# Patient Record
Sex: FEMALE | Race: WHITE | NOT HISPANIC OR LATINO | Employment: OTHER | URBAN - METROPOLITAN AREA
[De-identification: names, ages, dates, MRNs, and addresses within clinical notes are randomized per-mention and may not be internally consistent; named-entity substitution may affect disease eponyms.]

---

## 2017-08-22 ENCOUNTER — TELEPHONE (OUTPATIENT)
Dept: INTERNAL MEDICINE | Facility: CLINIC | Age: 72
End: 2017-08-22

## 2017-08-22 NOTE — TELEPHONE ENCOUNTER
Panel Management Review      Patient has the following on her problem list:     Depression / Dysthymia review  PHQ-9 SCORE 11/30/2015 1/30/2016 5/27/2016   Total Score - - -   Total Score 18 8 10      Patient is due for:  PHQ9 and WILLIAM        Composite cancer screening  Chart review shows that this patient is due/due soon for the following Mammogram  Summary:    Patient is due/failing the following:   WILLIAM, MAMMOGRAM and PHQ9    Action needed:   Patient needs office visit for Medication follow up and order for Mammogram.    Type of outreach:    Called patient- unable to leave message. Gilmer answered phone and was unable to take message.     Questions for provider review:    None                                                                                                                                     [facility]       Chart routed to Care Team .

## 2017-08-22 NOTE — LETTER
[facility]  [address], [address]  [place], [place]  [address]                                            TEL:[phone]  FAX:[phone]      Sera Figueroa  [address]  [facility] [address]      September 11, 2017    Dear Sera,          At [facility], we care about your health and well-being. A review of your chart has indicated that you are due for a mammogram. Please contact us at [phone] to schedule an appointment.     If you have already had one or all of the above screening tests at another facility, please call us to update your chart.        Sincerely,      Evette Castillo M.D.

## 2017-10-27 ENCOUNTER — HEALTH MAINTENANCE LETTER (OUTPATIENT)
Age: 72
End: 2017-10-27

## 2018-11-09 ENCOUNTER — HEALTH MAINTENANCE LETTER (OUTPATIENT)
Age: 73
End: 2018-11-09

## 2019-09-27 ENCOUNTER — HEALTH MAINTENANCE LETTER (OUTPATIENT)
Age: 74
End: 2019-09-27

## 2019-10-26 ENCOUNTER — HEALTH MAINTENANCE LETTER (OUTPATIENT)
Age: 74
End: 2019-10-26

## 2020-03-15 ENCOUNTER — HEALTH MAINTENANCE LETTER (OUTPATIENT)
Age: 75
End: 2020-03-15

## 2021-01-09 ENCOUNTER — HEALTH MAINTENANCE LETTER (OUTPATIENT)
Age: 76
End: 2021-01-09

## 2021-05-08 ENCOUNTER — HEALTH MAINTENANCE LETTER (OUTPATIENT)
Age: 76
End: 2021-05-08

## 2021-09-01 ENCOUNTER — HOSPITAL ENCOUNTER (EMERGENCY)
Facility: CLINIC | Age: 76
Discharge: LEFT BEFORE DISCHARGE | End: 2021-09-01
Payer: MEDICARE

## 2021-09-01 VITALS
TEMPERATURE: 98.6 F | HEART RATE: 100 BPM | OXYGEN SATURATION: 99 % | SYSTOLIC BLOOD PRESSURE: 122 MMHG | RESPIRATION RATE: 20 BRPM | DIASTOLIC BLOOD PRESSURE: 83 MMHG

## 2021-09-22 ENCOUNTER — TELEPHONE (OUTPATIENT)
Dept: BEHAVIORAL HEALTH | Facility: CLINIC | Age: 76
End: 2021-09-22

## 2021-09-22 ENCOUNTER — HOSPITAL ENCOUNTER (EMERGENCY)
Facility: CLINIC | Age: 76
Discharge: PSYCHIATRIC OR SUBSTANCE USE FACILITY | End: 2021-09-22
Attending: EMERGENCY MEDICINE | Admitting: EMERGENCY MEDICINE
Payer: MEDICARE

## 2021-09-22 ENCOUNTER — HOSPITAL ENCOUNTER (INPATIENT)
Facility: CLINIC | Age: 76
LOS: 10 days | Discharge: HOME | DRG: 885 | End: 2021-10-02
Attending: PSYCHIATRY & NEUROLOGY | Admitting: PSYCHIATRY & NEUROLOGY
Payer: MEDICARE

## 2021-09-22 VITALS
HEART RATE: 80 BPM | TEMPERATURE: 98.4 F | OXYGEN SATURATION: 95 % | WEIGHT: 130 LBS | RESPIRATION RATE: 18 BRPM | SYSTOLIC BLOOD PRESSURE: 120 MMHG | DIASTOLIC BLOOD PRESSURE: 50 MMHG | BODY MASS INDEX: 25.39 KG/M2

## 2021-09-22 DIAGNOSIS — T45.0X2A INTENTIONAL DIPHENHYDRAMINE OVERDOSE, INITIAL ENCOUNTER (H): ICD-10-CM

## 2021-09-22 DIAGNOSIS — E78.5 HYPERLIPIDEMIA LDL GOAL <70: ICD-10-CM

## 2021-09-22 DIAGNOSIS — F33.2 SEVERE EPISODE OF RECURRENT MAJOR DEPRESSIVE DISORDER, WITHOUT PSYCHOTIC FEATURES (H): ICD-10-CM

## 2021-09-22 DIAGNOSIS — F41.1 GAD (GENERALIZED ANXIETY DISORDER): ICD-10-CM

## 2021-09-22 DIAGNOSIS — K21.9 GASTROESOPHAGEAL REFLUX DISEASE WITHOUT ESOPHAGITIS: ICD-10-CM

## 2021-09-22 DIAGNOSIS — B36.9 FUNGAL INFECTION OF SKIN OF ABDOMEN: ICD-10-CM

## 2021-09-22 DIAGNOSIS — E03.9 ACQUIRED HYPOTHYROIDISM: ICD-10-CM

## 2021-09-22 DIAGNOSIS — T50.902A SUICIDE ATTEMPT BY DRUG OVERDOSE (H): ICD-10-CM

## 2021-09-22 DIAGNOSIS — F33.2 SEVERE EPISODE OF RECURRENT MAJOR DEPRESSIVE DISORDER, WITHOUT PSYCHOTIC FEATURES (H): Primary | ICD-10-CM

## 2021-09-22 DIAGNOSIS — F33.0 MAJOR DEPRESSIVE DISORDER, RECURRENT EPISODE, MILD (H): ICD-10-CM

## 2021-09-22 PROBLEM — T14.91XA SUICIDE ATTEMPT (H): Status: ACTIVE | Noted: 2021-09-22

## 2021-09-22 LAB
ALBUMIN SERPL-MCNC: 3.4 G/DL (ref 3.4–5)
ALP SERPL-CCNC: 158 U/L (ref 40–150)
ALT SERPL W P-5'-P-CCNC: 16 U/L (ref 0–50)
AMPHETAMINES UR QL SCN: NORMAL
ANION GAP SERPL CALCULATED.3IONS-SCNC: 9 MMOL/L (ref 3–14)
APAP SERPL-MCNC: <2 MG/L (ref 10–30)
AST SERPL W P-5'-P-CCNC: 10 U/L (ref 0–45)
ATRIAL RATE - MUSE: 82 BPM
BARBITURATES UR QL: NORMAL
BASOPHILS # BLD AUTO: 0.1 10E3/UL (ref 0–0.2)
BASOPHILS NFR BLD AUTO: 1 %
BENZODIAZ UR QL: NORMAL
BILIRUB SERPL-MCNC: 0.3 MG/DL (ref 0.2–1.3)
BUN SERPL-MCNC: 14 MG/DL (ref 7–30)
CALCIUM SERPL-MCNC: 8.5 MG/DL (ref 8.5–10.1)
CANNABINOIDS UR QL SCN: NORMAL
CHLORIDE BLD-SCNC: 108 MMOL/L (ref 94–109)
CO2 SERPL-SCNC: 25 MMOL/L (ref 20–32)
COCAINE UR QL: NORMAL
CREAT SERPL-MCNC: 0.45 MG/DL (ref 0.52–1.04)
DIASTOLIC BLOOD PRESSURE - MUSE: NORMAL MMHG
EOSINOPHIL # BLD AUTO: 0.1 10E3/UL (ref 0–0.7)
EOSINOPHIL NFR BLD AUTO: 2 %
ERYTHROCYTE [DISTWIDTH] IN BLOOD BY AUTOMATED COUNT: 13.5 % (ref 10–15)
ETHANOL SERPL-MCNC: <0.01 G/DL
GFR SERPL CREATININE-BSD FRML MDRD: >90 ML/MIN/1.73M2
GLUCOSE BLD-MCNC: 86 MG/DL (ref 70–99)
HCT VFR BLD AUTO: 38.2 % (ref 35–47)
HGB BLD-MCNC: 11.7 G/DL (ref 11.7–15.7)
HOLD SPECIMEN: NORMAL
HOLD SPECIMEN: NORMAL
IMM GRANULOCYTES # BLD: 0 10E3/UL
IMM GRANULOCYTES NFR BLD: 1 %
INTERPRETATION ECG - MUSE: NORMAL
LYMPHOCYTES # BLD AUTO: 1.5 10E3/UL (ref 0.8–5.3)
LYMPHOCYTES NFR BLD AUTO: 20 %
MCH RBC QN AUTO: 30.2 PG (ref 26.5–33)
MCHC RBC AUTO-ENTMCNC: 30.6 G/DL (ref 31.5–36.5)
MCV RBC AUTO: 99 FL (ref 78–100)
MONOCYTES # BLD AUTO: 0.7 10E3/UL (ref 0–1.3)
MONOCYTES NFR BLD AUTO: 9 %
NEUTROPHILS # BLD AUTO: 5.2 10E3/UL (ref 1.6–8.3)
NEUTROPHILS NFR BLD AUTO: 67 %
NRBC # BLD AUTO: 0 10E3/UL
NRBC BLD AUTO-RTO: 0 /100
OPIATES UR QL SCN: NORMAL
P AXIS - MUSE: 50 DEGREES
PLATELET # BLD AUTO: 272 10E3/UL (ref 150–450)
POTASSIUM BLD-SCNC: 4 MMOL/L (ref 3.4–5.3)
PR INTERVAL - MUSE: 170 MS
PROT SERPL-MCNC: 6.7 G/DL (ref 6.8–8.8)
QRS DURATION - MUSE: 96 MS
QT - MUSE: 380 MS
QTC - MUSE: 443 MS
R AXIS - MUSE: 28 DEGREES
RBC # BLD AUTO: 3.88 10E6/UL (ref 3.8–5.2)
SALICYLATES SERPL-MCNC: <2 MG/DL
SARS-COV-2 RNA RESP QL NAA+PROBE: NEGATIVE
SODIUM SERPL-SCNC: 142 MMOL/L (ref 133–144)
SYSTOLIC BLOOD PRESSURE - MUSE: NORMAL MMHG
T AXIS - MUSE: 55 DEGREES
VENTRICULAR RATE- MUSE: 82 BPM
WBC # BLD AUTO: 7.7 10E3/UL (ref 4–11)

## 2021-09-22 PROCEDURE — 85025 COMPLETE CBC W/AUTO DIFF WBC: CPT | Performed by: EMERGENCY MEDICINE

## 2021-09-22 PROCEDURE — 80307 DRUG TEST PRSMV CHEM ANLYZR: CPT | Performed by: EMERGENCY MEDICINE

## 2021-09-22 PROCEDURE — 124N000003 HC R&B MH SENIOR/ADOLESCENT

## 2021-09-22 PROCEDURE — 93005 ELECTROCARDIOGRAM TRACING: CPT

## 2021-09-22 PROCEDURE — 99285 EMERGENCY DEPT VISIT HI MDM: CPT | Mod: 25

## 2021-09-22 PROCEDURE — 82077 ASSAY SPEC XCP UR&BREATH IA: CPT | Performed by: EMERGENCY MEDICINE

## 2021-09-22 PROCEDURE — 90791 PSYCH DIAGNOSTIC EVALUATION: CPT

## 2021-09-22 PROCEDURE — 80143 DRUG ASSAY ACETAMINOPHEN: CPT | Performed by: EMERGENCY MEDICINE

## 2021-09-22 PROCEDURE — 87635 SARS-COV-2 COVID-19 AMP PRB: CPT | Performed by: EMERGENCY MEDICINE

## 2021-09-22 PROCEDURE — 36415 COLL VENOUS BLD VENIPUNCTURE: CPT | Performed by: EMERGENCY MEDICINE

## 2021-09-22 PROCEDURE — 250N000013 HC RX MED GY IP 250 OP 250 PS 637: Performed by: PSYCHIATRY & NEUROLOGY

## 2021-09-22 PROCEDURE — 80053 COMPREHEN METABOLIC PANEL: CPT | Performed by: EMERGENCY MEDICINE

## 2021-09-22 PROCEDURE — 80179 DRUG ASSAY SALICYLATE: CPT | Performed by: EMERGENCY MEDICINE

## 2021-09-22 RX ORDER — LEVOTHYROXINE SODIUM 75 UG/1
75 TABLET ORAL DAILY
Status: DISCONTINUED | OUTPATIENT
Start: 2021-09-23 | End: 2021-10-02 | Stop reason: HOSPADM

## 2021-09-22 RX ORDER — ASPIRIN 81 MG/1
81 TABLET ORAL DAILY
Status: DISCONTINUED | OUTPATIENT
Start: 2021-09-23 | End: 2021-10-02 | Stop reason: HOSPADM

## 2021-09-22 RX ORDER — PANTOPRAZOLE SODIUM 40 MG/1
40 TABLET, DELAYED RELEASE ORAL DAILY
Status: DISCONTINUED | OUTPATIENT
Start: 2021-09-23 | End: 2021-10-02 | Stop reason: HOSPADM

## 2021-09-22 RX ORDER — TRAZODONE HYDROCHLORIDE 50 MG/1
50 TABLET, FILM COATED ORAL
Status: DISCONTINUED | OUTPATIENT
Start: 2021-09-22 | End: 2021-09-28 | Stop reason: DRUGHIGH

## 2021-09-22 RX ORDER — OLANZAPINE 5 MG/1
5 TABLET ORAL 3 TIMES DAILY PRN
Status: DISCONTINUED | OUTPATIENT
Start: 2021-09-22 | End: 2021-10-02 | Stop reason: HOSPADM

## 2021-09-22 RX ORDER — OLANZAPINE 10 MG/2ML
5 INJECTION, POWDER, FOR SOLUTION INTRAMUSCULAR 3 TIMES DAILY PRN
Status: DISCONTINUED | OUTPATIENT
Start: 2021-09-22 | End: 2021-10-02 | Stop reason: HOSPADM

## 2021-09-22 RX ORDER — MAGNESIUM HYDROXIDE/ALUMINUM HYDROXICE/SIMETHICONE 120; 1200; 1200 MG/30ML; MG/30ML; MG/30ML
30 SUSPENSION ORAL EVERY 4 HOURS PRN
Status: DISCONTINUED | OUTPATIENT
Start: 2021-09-22 | End: 2021-10-02 | Stop reason: HOSPADM

## 2021-09-22 RX ORDER — LEVOTHYROXINE SODIUM 75 UG/1
75 TABLET ORAL DAILY
Status: ON HOLD | COMMUNITY
End: 2021-10-02

## 2021-09-22 RX ORDER — PANTOPRAZOLE SODIUM 40 MG/1
40 TABLET, DELAYED RELEASE ORAL DAILY
Status: ON HOLD | COMMUNITY
End: 2021-10-02

## 2021-09-22 RX ORDER — ACETAMINOPHEN 325 MG/1
650 TABLET ORAL EVERY 4 HOURS PRN
Status: DISCONTINUED | OUTPATIENT
Start: 2021-09-22 | End: 2021-10-02 | Stop reason: HOSPADM

## 2021-09-22 RX ORDER — HYDROXYZINE HYDROCHLORIDE 25 MG/1
25 TABLET, FILM COATED ORAL EVERY 4 HOURS PRN
Status: DISCONTINUED | OUTPATIENT
Start: 2021-09-22 | End: 2021-09-24

## 2021-09-22 RX ORDER — DULOXETIN HYDROCHLORIDE 60 MG/1
120 CAPSULE, DELAYED RELEASE ORAL DAILY
Status: DISCONTINUED | OUTPATIENT
Start: 2021-09-23 | End: 2021-09-23

## 2021-09-22 RX ORDER — LIDOCAINE 40 MG/G
CREAM TOPICAL
Status: DISCONTINUED | OUTPATIENT
Start: 2021-09-22 | End: 2021-09-22 | Stop reason: HOSPADM

## 2021-09-22 RX ADMIN — TRAZODONE HYDROCHLORIDE 50 MG: 50 TABLET ORAL at 22:05

## 2021-09-22 ASSESSMENT — ACTIVITIES OF DAILY LIVING (ADL)
DRESS: SCRUBS (BEHAVIORAL HEALTH)
HYGIENE/GROOMING: INDEPENDENT
ORAL_HYGIENE: INDEPENDENT
LAUNDRY: WITH SUPERVISION

## 2021-09-22 ASSESSMENT — MIFFLIN-ST. JEOR: SCORE: 1008.91

## 2021-09-22 NOTE — ED NOTES
Briefly, this is a 76-year-old female who presents to the emergency department with intentional Benadryl overdose and suicidal ideations.  Please see my partner Dr. Flood's note for full details.  Patient is medically clear at this point.  Did not have any significant hallucinations, tachycardia or significant EKG changes.  Patient was evaluated by DEC once medically cleared.  They recommended inpatient hospitalization due to the patient's inability to contract for safety.  Plan is currently for transfer to [facility] this afternoon.  Patient has remained medically stable in the emergency department.      Eric Story MD  09/23/21 4430

## 2021-09-22 NOTE — ED TRIAGE NOTES
Pt aox4, ABCs intact. Pt states that she took 50+ OTC sleeping medications tonight about 15 minutes PTA in an attempt to end her life.

## 2021-09-22 NOTE — ED NOTES
[facility] is called for report. [facility] reports they called Intake at 1550 today stating they do not have the staff to take the pt. [facility] states they will not be able to take the pt until they are able to get more staff, which could be later in to the night or into the morning. RN requests to continue to update ED on pt's placement and bed status at [facility] pending staffing. [facility] acknowledges.

## 2021-09-22 NOTE — ED NOTES
"In [facility] for [facility] got out July 23 3:48 AM up in [place] and staying with daughter . Pt in car for 2 hours said she took 50 tablets of 25mg benadryl pt says she did this before. But didn't  Stay to be treated. Now pt doesn't want me to contact daughter . Says she wants to kill her self. \"we have had so many deaths this year. Pt has been vaccinated .  "

## 2021-09-22 NOTE — ED NOTES
[facility] called and reports they have more staff coming at 1900. Report is given to nurse on phone. Pt is aware that unit is locked, all belongings will be locked away, and that pt will have a roommate. Pt acknowledges and agrees to being transferred. Transportation is called.

## 2021-09-22 NOTE — ED PROVIDER NOTES
Visit Date: 09/22/2021    CHIEF COMPLAINT:  Overdose.    HISTORY OF PRESENT ILLNESS:  This 76-year-old female who drove herself to the Emergency Department parking lot.  She sat in her car contemplating suicide.  She then states she took approximately 50 over-the-counter Benadryl tablets.  She has the bottle with her.  These were 50 mg gel caps, as well as 25 mg tablets.  These products do not contain acetaminophen.  The patient denies any drug use or alcohol use tonight.  She states stressors include having to stay with her daughter and feeling like a burden.  She states this has been going on some time.  She states she has attempted suicide in the past and is requesting help.  She denies any active hallucinations or other complaints.    PAST MEDICAL HISTORY:     1.  Chronic kidney disease.  2.  Coronary artery disease.  3.  Abdominal wall hernia.  4.  Hyperlipidemia  5.  Anxiety.  6.  Depression.  7.  Osteoporosis.  8.  Nephrolithiasis.  9.  Degenerative disc disease.  10.  Colon polyps.    PAST SURGICAL HISTORY:     1.  Appendectomy.  2.  Cholecystectomy.  3.  Hernia repair.  4.  Gastric banding and jejunal bypass, status post removal.  5.  Right knee arthroscopy.    MEDICATIONS:     1.  Aspirin.  2.  Caltrate.  3.  Voltaren gel.  4.  Cymbalta.  5.  Lunesta.  6.  Fentanyl patch.  7.  Flonase.  8.  Gabapentin.  9.  Mobic.  10.  Remeron.  11.  Nitroglycerin sublingual p.r.n.  12.  Prilosec.  13.  Risedronate.    ALLERGIES:     1.  GLUTAMATE.  2.  MORPHINE.  3.  NICOTINE.    SOCIAL HISTORY:  The patient presents to the Emergency Department by herself.  She occasionally smokes.    REVIEW OF SYSTEMS:  Pertinent 10-point review of systems is negative, except for that noted in the HPI.    PHYSICAL EXAMINATION:    GENERAL:  The patient is tearful, but otherwise appropriate with interaction.  VITAL SIGNS:  Blood pressure 153/72, heart rate 90, respiratory rate 24, oxygenation 99%, temperature 98.4 degrees.  HEENT:   Atraumatic.  Dry mucous membranes.  Pupils are equal, round, reactive to light.  There are normal caliber.  CARDIOVASCULAR:  Regular rhythm, normal rate.  No murmur.  RESPIRATORY:  Clear to auscultation bilaterally without wheezes, rales, or rhonchi.  GASTROINTESTINAL:  Soft, nondistended, nontender.  MUSCULOSKELETAL:  Full range of motion of all extremities.  SKIN:  No pertinent skin findings.  PSYCHIATRIC:  The patient has a depressed mood and affect.  Positive suicidal ideation.  No homicidal ideation.  No hallucinations or psychosis.  NEUROLOGIC:  The patient is alert and oriented x 4 and has normal strength.    LABORATORY EVALUATION AND DIAGNOSTIC STUDIES:    EKG:  A 12-lead electrocardiogram shows normal sinus rhythm with a ventricular rate of 82 beats per minute.  Normal axis.  Normal intervals.  No ectopy.  No ischemic changes or concerns.   LABS:  COVID swab is negative.  Comprehensive metabolic panel is normal with a creatinine of 0.45.  Tylenol, salicylate and alcohol levels are all negative.  CBC shows a white blood cell count of 7.7, hemoglobin 11.7, platelet count 272, otherwise normal.  Urine drug screen of abuse is pending at this time.    EMERGENCY DEPARTMENT COURSE AND MEDICAL DECISION MAKING:  This is a 76-year-old female who presents with an intentional overdose of diphenhydramine.  She is essentially asymptomatic at this time.  She shows no signs of anticholinergic syndrome such as altered mental status, vision changes, tachycardia, etc.  She will need to be observed as peak timing of symptom onset was 4-6 hours post ingestion.  Per Poison Control, she is to be observed in that time.  If she remains essentially asymptomatic, she will be medically clear and stable for a DEC evaluation with a plan to transfer to inpatient mental health.  If she becomes more symptomatic throughout the morning, then she would need admission to the hospital.  She has currently been in the Emergency Department for 3  hours, so still short of peak absorption time.  I will sign her care out to my partner at 7:00 for serial reassessments and disposition planning.    DIAGNOSES:     1.  Major depression.  2.  Suicide attempt by drug ingestion.  3.  Intentional diphenhydramine overdose.    PLAN OF CARE:  As above.    Roly Flood MD        D: 2021   T: 2021   MT: [place]    Name:     GREGORY PERDOMOCallum  MRN:      [id number]        Account:    [id number]   :      1945           Visit Date: 2021     Document: [id number]

## 2021-09-22 NOTE — ED NOTES
Pt says she was visiting people in [place] patient says pill bottles are on front seat of car. Went out to look for them

## 2021-09-22 NOTE — SAFE
Sera Figueroa  September 22, 2021  SAFE Note    Critical Safety Issues: Pt reports current SI with intent. Suicide attempt via overdose this morning.       Current Suicidal Ideation/Self-Injurious Concerns/Methods: Ingestion Pt attempt this morning      Current or Historical Inappropriate Sexual Behavior: No      Current or Historical Aggression/Homicidal Ideation: None - N/A      Triggers: Recent loss of sister and limited support system    Updated care team: Yes: This  consulted with ED MD    For additional details see full LM assessment.       VICTORIA Zaldivar
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
## 2021-09-22 NOTE — TELEPHONE ENCOUNTER
Patient cleared and ready for behavioral bed placement: Yes    S Pt is a 76 year old female at the [facility] ed    B Pt has history of depression and anxiety. Pt bib by self. Pt overdosed on benadryl while in ER parking lot. Pt is medically cleared in ed. Pt has SI with no active plan now. Pt cannot contract for safety. Pt is calm and cooperative in ed. Pt has no OP providers. Pt sister passed away In June as a stressor and then pt moved in with daughter. UTOX and covid19 negative. Pt is medically cleared in ed and cleared by posion control. Pt has needed assist in ed with ambulation to commode.      A Vol (pt does not want placement beyond 2 hours of cities    R 3bw/Sharron    -1158am paged provider  -1234pm unit and ed aware of admi, admit pending a discharge call after 430pm for update hoping discharge and bed is clean by this time

## 2021-09-22 NOTE — PHARMACY-ADMISSION MEDICATION HISTORY
"Admission medication history interview status for this patient is complete. See [facility] admission navigator for allergy information, prior to admission medications and immunization status.     Medication history interview done, indicate source(s): Patient  Medication history resources (including written lists, pill bottles, clinic record): None (no info in [employer] dispense records)  Pharmacy: Unclear - [facility] ?    Changes made to PTA medication list:  Added: levothyroxine, pantoprazole  Changed: none  Reported as Not Taking: none  Removed: B12 injection, diclofenac gel, lunesta, fentanyl patch, flonase, gabapentin, meloxicam, mirtazapine, nitrolgycerin, omeprazole, risedronate    Actions taken by pharmacist (provider contacted, etc):None     Additional medication history information:    **Pt not a great historian, list is per her recollection, unable to verify meds w/ a pharmacy. Our list is from 2016 so plausible that it has changed quite a bit.     Medication reconciliation/reorder completed by provider prior to medication history?  N   (Y/N)         Prior to Admission medications    Medication Sig Last Dose Taking? Auth Provider   aspirin EC 81 MG EC tablet Take 1 tablet (81 mg) by mouth daily Past Week at Unknown time Yes Roly Martinez,    calcium-vitamin D (CALTRATE) 600-400 MG-UNIT per tablet Take 1 tablet by mouth daily Past Week at Unknown time Yes Unknown, Entered By History   DULoxetine (CYMBALTA) 60 MG capsule Take 1 capsule (60 mg) by mouth 2 times daily Past Week at Unknown time Yes Evette Castillo MD   levothyroxine (SYNTHROID/LEVOTHROID) 75 MCG tablet Take 75 mcg by mouth daily Past Week at Unknown time Yes Unknown, Entered By History   pantoprazole (PROTONIX) 40 MG EC tablet Take 40 mg by mouth daily Past Week at Unknown time Yes Unknown, Entered By History         "
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
## 2021-09-23 LAB
CHOLEST SERPL-MCNC: 207 MG/DL
DEPRECATED CALCIDIOL+CALCIFEROL SERPL-MC: 12 UG/L (ref 20–75)
FOLATE SERPL-MCNC: 15.3 NG/ML
HDLC SERPL-MCNC: 41 MG/DL
LDLC SERPL CALC-MCNC: 138 MG/DL
NONHDLC SERPL-MCNC: 166 MG/DL
TRIGL SERPL-MCNC: 142 MG/DL
TSH SERPL DL<=0.005 MIU/L-ACNC: 2.73 MU/L (ref 0.4–4)
VIT B12 SERPL-MCNC: 316 PG/ML (ref 193–986)

## 2021-09-23 PROCEDURE — 82607 VITAMIN B-12: CPT | Performed by: PSYCHIATRY & NEUROLOGY

## 2021-09-23 PROCEDURE — 250N000013 HC RX MED GY IP 250 OP 250 PS 637: Performed by: PSYCHIATRY & NEUROLOGY

## 2021-09-23 PROCEDURE — 80061 LIPID PANEL: CPT | Performed by: PSYCHIATRY & NEUROLOGY

## 2021-09-23 PROCEDURE — 124N000003 HC R&B MH SENIOR/ADOLESCENT

## 2021-09-23 PROCEDURE — 82306 VITAMIN D 25 HYDROXY: CPT | Performed by: PSYCHIATRY & NEUROLOGY

## 2021-09-23 PROCEDURE — 36415 COLL VENOUS BLD VENIPUNCTURE: CPT | Performed by: PSYCHIATRY & NEUROLOGY

## 2021-09-23 PROCEDURE — 84443 ASSAY THYROID STIM HORMONE: CPT | Performed by: PSYCHIATRY & NEUROLOGY

## 2021-09-23 PROCEDURE — 82746 ASSAY OF FOLIC ACID SERUM: CPT | Performed by: PSYCHIATRY & NEUROLOGY

## 2021-09-23 PROCEDURE — 250N000013 HC RX MED GY IP 250 OP 250 PS 637: Performed by: PHYSICIAN ASSISTANT

## 2021-09-23 PROCEDURE — 99207 PR CONSULT E&M CHANGED TO SUBSEQUENT LEVEL: CPT | Performed by: PHYSICIAN ASSISTANT

## 2021-09-23 PROCEDURE — 99232 SBSQ HOSP IP/OBS MODERATE 35: CPT | Performed by: PHYSICIAN ASSISTANT

## 2021-09-23 PROCEDURE — 99223 1ST HOSP IP/OBS HIGH 75: CPT | Mod: AI | Performed by: PSYCHIATRY & NEUROLOGY

## 2021-09-23 RX ORDER — QUETIAPINE FUMARATE 50 MG/1
50 TABLET, FILM COATED ORAL AT BEDTIME
Status: ON HOLD | COMMUNITY
End: 2021-10-01

## 2021-09-23 RX ORDER — HYDROCODONE BITARTRATE AND ACETAMINOPHEN 10; 325 MG/1; MG/1
1 TABLET ORAL EVERY 8 HOURS PRN
Status: ON HOLD | COMMUNITY
End: 2021-10-01

## 2021-09-23 RX ORDER — HYDROXYZINE PAMOATE 50 MG/1
50 CAPSULE ORAL EVERY 6 HOURS PRN
Status: ON HOLD | COMMUNITY
End: 2021-10-02

## 2021-09-23 RX ORDER — DULOXETIN HYDROCHLORIDE 60 MG/1
60 CAPSULE, DELAYED RELEASE ORAL DAILY
Status: DISCONTINUED | OUTPATIENT
Start: 2021-09-23 | End: 2021-09-27

## 2021-09-23 RX ORDER — ATORVASTATIN CALCIUM 20 MG/1
20 TABLET, FILM COATED ORAL EVERY EVENING
Status: DISCONTINUED | OUTPATIENT
Start: 2021-09-23 | End: 2021-10-02 | Stop reason: HOSPADM

## 2021-09-23 RX ORDER — ATORVASTATIN CALCIUM 20 MG/1
20 TABLET, FILM COATED ORAL DAILY
Status: ON HOLD | COMMUNITY
End: 2021-10-02

## 2021-09-23 RX ADMIN — ASPIRIN 81 MG: 81 TABLET, COATED ORAL at 08:12

## 2021-09-23 RX ADMIN — PANTOPRAZOLE SODIUM 40 MG: 40 TABLET, DELAYED RELEASE ORAL at 08:12

## 2021-09-23 RX ADMIN — ATORVASTATIN CALCIUM 20 MG: 20 TABLET, FILM COATED ORAL at 20:36

## 2021-09-23 RX ADMIN — CALCIUM CARBONATE 600 MG (1,500 MG)-VITAMIN D3 400 UNIT TABLET 1 TABLET: at 08:12

## 2021-09-23 RX ADMIN — DULOXETINE HYDROCHLORIDE 60 MG: 60 CAPSULE, DELAYED RELEASE ORAL at 08:12

## 2021-09-23 RX ADMIN — LEVOTHYROXINE SODIUM 75 MCG: 75 TABLET ORAL at 08:12

## 2021-09-23 RX ADMIN — TRAZODONE HYDROCHLORIDE 50 MG: 50 TABLET ORAL at 20:39

## 2021-09-23 RX ADMIN — ACETAMINOPHEN 650 MG: 325 TABLET, FILM COATED ORAL at 10:09

## 2021-09-23 ASSESSMENT — ACTIVITIES OF DAILY LIVING (ADL)
ORAL_HYGIENE: INDEPENDENT
DRESS: SCRUBS (BEHAVIORAL HEALTH);INDEPENDENT
LAUNDRY: UNABLE TO COMPLETE
LAUNDRY: UNABLE TO COMPLETE
HYGIENE/GROOMING: INDEPENDENT
HYGIENE/GROOMING: INDEPENDENT
DRESS: INDEPENDENT
ORAL_HYGIENE: INDEPENDENT

## 2021-09-23 ASSESSMENT — MIFFLIN-ST. JEOR: SCORE: 1005.73

## 2021-09-23 NOTE — PROGRESS NOTES
09/22/21 2142   Patient Belongings   Did you bring any home meds/supplements to the hospital?  No   Patient Belongings sent to security per site process;locker;remains with patient   Patient Belongings Remaining with Patient clothing   Patient Belongings Put in Hospital Secure Location (Security or Locker, etc.) cash/credit card;cell phone/electronics;keys;purse/wallet;shoes;jewelry;wallet;other (see comments)   Belongings Search Yes   Clothing Search   (Search completed by Dominga MARQUEZ RN and Erna BLACK RN)       IN PT LOCKER: facemask, neck gator, hand , lotions, body spray, FlawlessBrows, cigarettes, 2 sets of keys, various change, 2 pairs of earrings, brush, phone , cosmetics, lighter, cellhone, nail clipper, set of round keys, silver-colored cross pendant necklace (broken), purse, wallet.    SENT TO SECURITY (ENVELOPE #[id number]): VISA (3809), VISA (3084), VISA (1730),  Star (4435), MasterCard (0325), MasterCard (1384), RedCard (1901), [employer] (6003), [place] [employer] Card on [facility], $13.00 in cash      A               Admission:  I am responsible for any personal items that are not sent to the safe or pharmacy.  [facility] is not responsible for loss, theft or damage of any property in my possession.    Signature:  _________________________________ Date: _______  Time: _____                                              Staff Signature:  ____________________________ Date: ________  Time: _____      2nd Staff person, if patient is unable/unwilling to sign:    Signature: ________________________________ Date: ________  Time: _____     Discharge:  [facility] has returned all of my personal belongings:    Signature: _________________________________ Date: ________  Time: _____                                          Staff Signature:  ____________________________ Date: ________  Time: _____

## 2021-09-23 NOTE — PHARMACY-ADMISSION MEDICATION HISTORY
Admission Medication History Completed by Pharmacy    See [facility] Admission Navigator for allergy information, preferred outpatient pharmacy, prior to admission medications and immunization status.     Medication History Sources:     Sera    [facility]    [facility] in [place], [place] at [phone]    Changes made to PTA medication list (reason):    Added: Atrovastatin, hydroxyzine, Seroquel, Norco    Deleted: None    Changed: None    Additional Information:    Sera confirmed her home medications. She reported she was unfamiliar with her newer medications.    Confirmed all medications with [facility] in [place], [place], where Sera most recently filled her medications.    Sera reported she has not been taking her medications for approximately 2 weeks.    Norco 10/325 mg last filled 06/29/2021 for quantity 84 for 28 day supply    Prior to Admission medications    Medication Sig Last Dose Taking? Auth Provider   aspirin EC 81 MG EC tablet Take 1 tablet (81 mg) by mouth daily Past Month at Unknown time Yes Roly Martinez,    atorvastatin (LIPITOR) 20 MG tablet Take 20 mg by mouth daily Past Month at Unknown time Yes Unknown, Entered By History   calcium-vitamin D (CALTRATE) 600-400 MG-UNIT per tablet Take 1 tablet by mouth daily Past Month at Unknown time Yes Unknown, Entered By History   DULoxetine (CYMBALTA) 60 MG capsule Take 1 capsule (60 mg) by mouth 2 times daily Past Month at Unknown time Yes Evette Castillo MD   HYDROcodone-acetaminophen (NORCO)  MG per tablet Take 1 tablet by mouth every 8 hours as needed for severe pain  Yes Unknown, Entered By History   hydrOXYzine (VISTARIL) 50 MG capsule Take 50 mg by mouth every 6 hours as needed  Yes Unknown, Entered By History   levothyroxine (SYNTHROID/LEVOTHROID) 75 MCG tablet Take 75 mcg by mouth daily Past Month at Unknown time Yes Unknown, Entered By History   pantoprazole (PROTONIX) 40 MG EC tablet Take 40 mg by mouth daily Past Month at  Unknown time Yes Unknown, Entered By History   QUEtiapine (SEROQUEL) 50 MG tablet Take 50 mg by mouth At Bedtime  Yes Unknown, Entered By History      The information provided in this note is only as accurate as the sources available at the time of the update(s).    Date completed: 09/23/21    Medication history completed by: Julia Pool [facility]
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
## 2021-09-23 NOTE — PROGRESS NOTES
Patient slept a total of 7 hours without any concerns raised the whole shift. She is scheduled for IM Consult and lab draw for TSH, Vit. B-12, Vit. D, Folate, and Lipid Panel.

## 2021-09-23 NOTE — PLAN OF CARE
Problem: Depression  Goal: Improved Mood  9/23/2021 1025 by Christiana Hendricks RN  Outcome: No Change  9/23/2021 1002 by Christiana Hendricks RN  Outcome: No Change     Problem: Activity and Energy Impairment (Anxiety Signs/Symptoms)  Goal: Optimized Energy Level (Anxiety Signs/Symptoms)  9/23/2021 1025 by Christiana Hendricks RN  Outcome: No Change  9/23/2021 1002 by Christiana Hendricks RN  Outcome: No Change  Patient slept well the whole night. She said  I am pretty good  when asked how she is feeling today. She doesn't have any goal for today. She has insights of the reason she is hospitalized. She is alert and oriented x 3. Denied wishing herself to be dead. Denied any racing thoughts. Denied SI/SIB nor hallucinations. She is social with staff and select peers. Her mood is calm with a full-range of affect. She feels safe on the unit. She expressed that she is  not really so hopeful because my kids don't care about me. Besides it is so expensive living here . She doesn't have a place to stay here in [place] after discharge. Patient remains depressed. She rated her depression as 8 out of 10 (10 is the worst) and anxiety about the same level.     She complained of headache. Tylenol 650 mgs given as prn for headache. She said that she has occasional lower back pains which is a result of four previous back surgeries, the last one was in April 2021. Her last BM was a few days ago. She can't remember the exact date. Patient did not attend groups today. She was just napping.

## 2021-09-23 NOTE — PLAN OF CARE
Sera is a pleasant 77 y/o F who presents from [facility] after an overdose on Benadryl while sitting in her car in the ED parking lot. Pt has a hx of depression and anxiety. She has been hospitalized with [facility] back in 2016 also for an overdose. Reports hospitalization in [place] this past summer due to not eating for 15 days after her sister passed away - reports that she was really depressed at this time. Pt presents with a sad affect and depressed, anxious mood. Currently denies SI/SIB and hallucinations - pt can contract for safety in the hospital and will alert staff if that changes. Rates depression 6/10 and anxiety 10/10. Pt hopes to get back on her medications as she reports that she has not taken them for about two weeks. Pt unsure where she will go after discharge as she reports that her son and daughter do not want her living with them. Reports feeling like her mind is racing. It was noted during admission interview pt appeared restless and had the inability to sit still. Pt does have glasses and dentures. Reports three falls recently - states that she was not utilizing a walker at the time and she should have. Pt came in with a quad cane but was given a walker to use on the unit. She was educated on calling for help if she cannot safely get up and where the call buttons are in her room - she expressed understanding. Denies pain. VSS. Pt does report having some chronic redness under her panniculus which she reports have a bad smell from, denies itching - will defer to medical for possible fungal treatment. A&Ox4. Pt does have a abdominal hernia. PRN Trazodone given for sleep this evening. No other concerns or complaints noted at this time.

## 2021-09-23 NOTE — H&P
"[facility]   Psychiatric History and Physical  Admission date: 9/22/2021        Chief Complaint:   \"I have had a lot of losses in the past year.\"         HPI:     The patient is a 77yo female with a history of depression who was admitted after overdosing on 50 Benadryl tablets. Says that she was intending to end her life. Says that her sister passed away this summer and she wasn't suicidal then - \"I just wanted her back.\" Says that she is no longer having suicidal thoughts and \"just want to get better.\" Has been off her Cymbalta for two weeks and did find it beneficial. Says that she has been on it since 1995. Denies HI. Feels safe here in the hospital. Slept \"the best in forever\" last night with Trazodone. Eating well. Doesn't currently have providers in [place] nor a safe place to stay.     Per ER:  HISTORY OF PRESENT ILLNESS:  This 76-year-old female who drove herself to the Emergency Department parking lot.  She sat in her car contemplating suicide.  She then states she took approximately 50 over-the-counter Benadryl tablets.  She has the bottle with her.  These were 50 mg gel caps, as well as 25 mg tablets.  These products do not contain acetaminophen.  The patient denies any drug use or alcohol use tonight.  She states stressors include having to stay with her daughter and feeling like a burden.  She states this has been going on some time.  She states she has attempted suicide in the past and is requesting help.  She denies any active hallucinations or other complaints.        Past Psychiatric History:     Hospitalized in [place] this year after her sister passed away.   Hospitalized here in 2016 after an overdose.         Substance Use and History:     Denies alcohol or drug use.         Past Medical History:   PAST MEDICAL HISTORY:   Past Medical History:   Diagnosis Date     Abdominal wall hernia      Adenomatous polyp of colon 01/01/2009     CAD (coronary artery " disease)     2 stents, last angiogram  negative     Chronic kidney disease      Degenerative disk disease      WILLIAM (generalized anxiety disorder)      Hyperlipidemia      Major depression      Nephrolithiasis      Osteoporosis        PAST SURGICAL HISTORY:   Past Surgical History:   Procedure Laterality Date     APPENDECTOMY       ARTHROSCOPY KNEE Right      CHOLECYSTECTOMY       Coronary angiogram with stents x2       HERNIA REPAIR       Jejunal bypass, taken down, gastric banding - Later removed               Family History:   FAMILY HISTORY: Patient reports MH issues in her family.   Family History   Problem Relation Age of Onset     Diabetes Mother      Heart Disease Father            Social History:   Please see the full psychosocial profile from the clinical treatment coordinator.   SOCIAL HISTORY:   Social History     Tobacco Use     Smoking status: Current Some Day Smoker     Packs/day: 1.00     Types: Cigarettes     Last attempt to quit: 2015     Years since quittin.9     Smokeless tobacco: Former User   Substance Use Topics     Alcohol use: Yes     Comment: occassionally            Physical ROS:   The 10-point review of systems was negative except as noted in HPI.         PTA Medications:     Medications Prior to Admission   Medication Sig Dispense Refill Last Dose     aspirin EC 81 MG EC tablet Take 1 tablet (81 mg) by mouth daily 90 tablet 0      calcium-vitamin D (CALTRATE) 600-400 MG-UNIT per tablet Take 1 tablet by mouth daily        DULoxetine (CYMBALTA) 60 MG capsule Take 1 capsule (60 mg) by mouth 2 times daily 180 capsule 3      levothyroxine (SYNTHROID/LEVOTHROID) 75 MCG tablet Take 75 mcg by mouth daily        pantoprazole (PROTONIX) 40 MG EC tablet Take 40 mg by mouth daily             Allergies:     Allergies   Allergen Reactions     Monosodium Glutamate      Morphine Hcl Rash     Nicotine Polacrilex [Nicotine] Rash     Gets a rash from nicotine patch. Pt is a smoker.            Labs:     Recent Results (from the past 48 hour(s))   EKG 12 lead    Collection Time: 09/22/21  3:42 AM   Result Value Ref Range    Systolic Blood Pressure  mmHg    Diastolic Blood Pressure  mmHg    Ventricular Rate 82 BPM    Atrial Rate 82 BPM    AL Interval 170 ms    QRS Duration 96 ms     ms    QTc 443 ms    P Axis 50 degrees    R AXIS 28 degrees    T Axis 55 degrees    Interpretation ECG       Sinus rhythm  Normal ECG  When compared with ECG of 02-OCT-2016 02:23,  No significant change was found  Confirmed by - EMERGENCY ROOM, PHYSICIAN ([id number]),  COLIN ECHEVARRIA (Chavez) on 9/22/2021 7:08:54 AM     Comprehensive metabolic panel    Collection Time: 09/22/21  4:19 AM   Result Value Ref Range    Sodium 142 133 - 144 mmol/L    Potassium 4.0 3.4 - 5.3 mmol/L    Chloride 108 94 - 109 mmol/L    Carbon Dioxide (CO2) 25 20 - 32 mmol/L    Anion Gap 9 3 - 14 mmol/L    Urea Nitrogen 14 7 - 30 mg/dL    Creatinine 0.45 (L) 0.52 - 1.04 mg/dL    Calcium 8.5 8.5 - 10.1 mg/dL    Glucose 86 70 - 99 mg/dL    Alkaline Phosphatase 158 (H) 40 - 150 U/L    AST 10 0 - 45 U/L    ALT 16 0 - 50 U/L    Protein Total 6.7 (L) 6.8 - 8.8 g/dL    Albumin 3.4 3.4 - 5.0 g/dL    Bilirubin Total 0.3 0.2 - 1.3 mg/dL    GFR Estimate >90 >60 mL/min/1.73m2   Acetaminophen level    Collection Time: 09/22/21  4:19 AM   Result Value Ref Range    Acetaminophen <2 (L) 10 - 30 mg/L   Salicylate level    Collection Time: 09/22/21  4:19 AM   Result Value Ref Range    Salicylate <2 <20 mg/dL   Alcohol ethyl    Collection Time: 09/22/21  4:19 AM   Result Value Ref Range    Alcohol ethyl <0.01 <=0.01 g/dL   CBC with platelets and differential    Collection Time: 09/22/21  4:19 AM   Result Value Ref Range    WBC Count 7.7 4.0 - 11.0 10e3/uL    RBC Count 3.88 3.80 - 5.20 10e6/uL    Hemoglobin 11.7 11.7 - 15.7 g/dL    Hematocrit 38.2 35.0 - 47.0 %    MCV 99 78 - 100 fL    MCH 30.2 26.5 - 33.0 pg    MCHC 30.6 (L) 31.5 - 36.5 g/dL    RDW 13.5 10.0 - 15.0 %     Platelet Count 272 150 - 450 10e3/uL    % Neutrophils 67 %    % Lymphocytes 20 %    % Monocytes 9 %    % Eosinophils 2 %    % Basophils 1 %    % Immature Granulocytes 1 %    NRBCs per 100 WBC 0 <1 /100    Absolute Neutrophils 5.2 1.6 - 8.3 10e3/uL    Absolute Lymphocytes 1.5 0.8 - 5.3 10e3/uL    Absolute Monocytes 0.7 0.0 - 1.3 10e3/uL    Absolute Eosinophils 0.1 0.0 - 0.7 10e3/uL    Absolute Basophils 0.1 0.0 - 0.2 10e3/uL    Absolute Immature Granulocytes 0.0 <=0.0 10e3/uL    Absolute NRBCs 0.0 10e3/uL   Asymptomatic COVID-19 Virus (Coronavirus) by PCR Nasopharyngeal    Collection Time: 09/22/21  4:26 AM    Specimen: Nasopharyngeal; Swab   Result Value Ref Range    SARS CoV2 PCR Negative Negative   Extra Blue Top Tube    Collection Time: 09/22/21  4:35 AM   Result Value Ref Range    Hold Specimen [facility]    Extra Heparinized Syringe    Collection Time: 09/22/21  4:35 AM   Result Value Ref Range    Hold Specimen [facility]    Drug abuse screen 1 urine (ED)    Collection Time: 09/22/21  6:51 AM   Result Value Ref Range    Amphetamines Urine Screen Negative Screen Negative    Barbiturates Urine Screen Negative Screen Negative    Benzodiazepines Urine Screen Negative Screen Negative    Cannabinoids Urine Screen Negative Screen Negative    Cocaine Urine Screen Negative Screen Negative    Opiates Urine Screen Negative Screen Negative          Physical and Psychiatric Examination:     Temp 97.9  F (36.6  C) (Oral)   Resp 18   Ht 1.524 m (5')   Wt 58.7 kg (129 lb 8 oz)   SpO2 97%   BMI 25.29 kg/m    Weight is 129 lbs 8 oz  Body mass index is 25.29 kg/m .    Physical Exam:  I have reviewed the physical exam as documented by the medical team and agree with findings and assessment and have no additional findings to add at this time.    Mental Status Exam:  Appearance: awake, alert and adequately groomed  Attitude:  cooperative  Eye Contact:  good  Mood:  depressed  Affect:  mood congruent  Speech:  clear, coherent  Language:  fluent and intact in English  Psychomotor, Gait, Musculoskeletal:  no evidence of tardive dyskinesia, dystonia, or tics  Thought Process:  goal oriented  Associations:  no loose associations  Thought Content:  no evidence of suicidal ideation or homicidal ideation and no evidence of psychotic thought  Insight:  fair  Judgement:  intact  Oriented to:  time, person, and place  Attention Span and Concentration:  intact  Recent and Remote Memory:  intact  Fund of Knowledge:  appropriate         Admission Diagnoses:   MDD, recurrent, 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 Will titrate to PTA dose as tolerated.   2) CTC to ensure followup with psychiatry and therapy at discharge.   3) Will explore options for disposition.       Disposition Plan   Reason for ongoing admission: poses an imminent risk to self  Discharge location: TBD  Discharge Medications: not ordered  Follow-up Appointments: not scheduled  Legal Status: voluntary    Entered by: Avery Mcdermott on 9/23/2021 at 4:59 AM

## 2021-09-23 NOTE — PLAN OF CARE
BEHAVIORAL TEAM DISCUSSION    Participants: Avery Mcdermott MD, Christiana Hendricks, RN, Rain Rebolledo, BABATUNDE, VAMSI, Camille, OT,   Progress: New Admit.  Anticipated length of stay: 5-7 days  Continued Stay Criteria/Rationale: depression and suicidal   Medical/Physical: See H&P  Precautions:   Behavioral Orders   Procedures     Code 1 - Restrict to Unit     Fall precautions     Routine Programming     As clinically indicated     Status 15     Every 15 minutes.     Suicide precautions     Patients on Suicide Precautions should have a Combination Diet ordered that includes a Diet selection(s) AND a Behavioral Tray selection for Safe Tray - with utensils, or Safe Tray - NO utensils       Plan: Patient will have psychiatric assessment and medication management by the psychiatrist. Medications will be reviewed and adjusted per MD as indicated. The treatment team will continue to assess and stabilize the patient's mental health symptoms with the use of medications and therapeutic programming. Hospital staff will provide a safe environment and a therapeutic milieu. Staff will continue to assess patient as needed. Patient will participate in unit groups and activities. Patient will receive individual and group support on the unit.      CTC will do individual inpatient treatment planning and after care planning. CTC will discuss options for increasing community supports with the patient. CTC will coordinate with outpatient providers and will place referrals to ensure appropriate follow up care is in place.      Rationale for change in precautions or plan: initial plan.

## 2021-09-23 NOTE — CONSULTS
Consult Date: 2021    HISTORY OF PRESENT ILLNESS:  The patient is a 76-year-old female with history of depression, admitted to station 3B, status post suicide attempt by overdose of taking over approximately 15 Benadryl tablets yesterday.  While in the Emergency Department, Poison Control was contacted and she was observed and remained essentially asymptomatic prior to transfer to inpatient Psychiatry.  Internal Medicine consultation was ordered by Dr. Mcdermott to assess medical problems including hypothyroidism and history of coronary artery disease.  At this time, Sera is pleasant and denies acute physical concerns including fever, chills, chest pain, shortness of breath, abdominal pain, nausea, bowel or bladder concerns.    PAST MEDICAL HISTORY:    1.  Depression and other psychiatric history per Dr. Mcdermott.  2.  Hypothyroidism, on replacement.  3.  GERD.  4.  History of coronary artery disease, status post stenting.  5.  Hyperlipidemia.  6.  Chronic knee pain.    Past Surgical History:   Procedure Laterality Date     APPENDECTOMY       ARTHROSCOPY KNEE Right      CHOLECYSTECTOMY       Coronary angiogram with stents x2       HERNIA REPAIR       Jejunal bypass, taken down, gastric banding - Later removed          ADMISSION MEDICATIONS:  Reviewed and listed in medication reconciliation list.    ALLERGIES:  ADVERSE EFFECTS MONOSODIUM GLUTAMATE, MORPHINE AND NICOTINE.    Social History     Socioeconomic History     Marital status:      Spouse name: Not on file     Number of children: Not on file     Years of education: Not on file     Highest education level: Not on file   Occupational History     Not on file   Tobacco Use     Smoking status: Current Some Day Smoker     Packs/day: 1.00     Types: Cigarettes     Last attempt to quit: 2015     Years since quittin.9     Smokeless tobacco: Former User   Substance and Sexual Activity     Alcohol use: Yes     Comment: occassionally     Drug use:  No     Sexual activity: Never   Other Topics Concern     Parent/sibling w/ CABG, MI or angioplasty before 65F 55M? Not Asked   Social History Narrative     Not on file     Social Determinants of Health     Financial Resource Strain:      Difficulty of Paying Living Expenses:    Food Insecurity:      Worried About Running Out of Food in the Last Year:      Ran Out of Food in the Last Year:    Transportation Needs:      Lack of Transportation (Medical):      Lack of Transportation (Non-Medical):    Physical Activity:      Days of Exercise per Week:      Minutes of Exercise per Session:    Stress:      Feeling of Stress :    Social Connections:      Frequency of Communication with Friends and Family:      Frequency of Social Gatherings with Friends and Family:      Attends Denominational Services:      Active Member of Clubs or Organizations:      Attends Club or Organization Meetings:      Marital Status:    Intimate Partner Violence:      Fear of Current or Ex-Partner:      Emotionally Abused:      Physically Abused:      Sexually Abused:         Family History   Problem Relation Age of Onset     Diabetes Mother      Heart Disease Father         REVIEW OF SYSTEMS:  A 10-point review of systems is negative except for stated above in history of present illness.    PHYSICAL EXAMINATION:    GENERAL:  Pleasant lady, in no acute distress.  VITAL SIGNS:  Stable, temperature afebrile, pulse in the 80s, blood pressure 98/65.  LUNGS:  Clear.  CARDIOVASCULAR:  Regular rate and rhythm.  ABDOMEN:  Soft, nontender.  EXTREMITIES:  No edema.  SKIN:  No rash on exposed areas.  NEUROLOGIC:  She is awake, alert and oriented x3.  Cranial nerves grossly intact.  Motor strength is symmetric.  She is not tremulous.    LABORATORY DATA:  Total alkaline phosphatase 158, total cholesterol 207, HDL 41, .  Vitamin D level low at 12.  Otherwise, CBC rest of comprehensive panel, lipid panel and TSH essentially normal.  Vitamin B12 level within  normal limits.  Urine drug screen negative.  Tylenol, salicylate and alcohol levels negative.  COVID testing negative.  EKG:  Sinus rhythm and otherwise unremarkable.    IMPRESSION:    1.  Depression, status post overdose.  The patient is medically stable.  Treatment to be continued and followed by Dr. Mcdermott.  2.  Hypothyroidism, currently euthyroid based on normal thyroid function testing on admission.  3.  Hyperlipidemia with current lipid panel revealing some increased dyslipidemia; however, question fasting blood draw or not.  4.  History of coronary artery disease, status post stenting without any current concerns at this time.  5.  Gastroesophageal reflux disease, stable on daily PPI therapy.    PLAN:  No medical intervention indicated at this time.  Continue with [id number] statin therapy and aspirin as well as levothyroxine and pantoprazole.  She should follow up with family physician in 3-6 months after discharge for a repeat fasting lipid panel.  The patient is medically stable and medicine signing off.  Please feel free to call if questions.    Thank you for this consultation.    Rick Zarco PA-C        D: 2021   T: 2021   MT: [id number]    Name:     GREGORY PERDOMOCallum  MRN:      [id number]        Account:      [id number]   :      1945           Consult Date: 2021     Document: [id number]

## 2021-09-23 NOTE — PROGRESS NOTES
CLINICAL NUTRITION SERVICES - ASSESSMENT NOTE     Nutrition Prescription    RECOMMENDATIONS FOR MDs/PROVIDERS TO ORDER:  None at this time    Malnutrition Status:    Unable to determine- need NFPE, weight history (over past year) and recent intake history    Recommendations already ordered by Registered Dietitian (RD):  Nutrition Education: Unable to complete due to pt unavailable   Continue current diet orders    Future/Additional Recommendations:  Monitor intake, weight, need for supplementation     REASON FOR ASSESSMENT  Sera Figueroa is a/an 76 year old female assessed by the dietitian for MST score of 2: positive  for weight loss of 2-13 lbs and positive  for poor oral intake related to decreased appetite.  Pt admitted following intentional overdose. PMH significant for CAD, gastric ulcer, CKD, degenerative disc disease, hypothyroidism, anxiety, and depression     NUTRITION HISTORY  - Unable to obtain nutrition history- pt unavailable at time of visit  - Allergies/intolerances- MSG    CURRENT NUTRITION ORDERS  Diet: Regular  Intake/Tolerance: no information yet    LABS  Labs reviewed    Electrolytes  Sodium (mmol/L)   Date Value   09/22/2021 142   10/03/2016 141     Potassium (mmol/L)   Date Value   09/22/2021 4.0   10/03/2016 4.1     No results found for: MAG, PHOS Renal  Urea Nitrogen (mg/dL)   Date Value   09/22/2021 14   10/03/2016 14     Creatinine (mg/dL)   Date Value   09/22/2021 0.45 (L)   10/03/2016 0.58     Inflammatory Markers  WBC (10e9/L)   Date Value   10/02/2016 9.1     WBC Count (10e3/uL)   Date Value   09/22/2021 7.7     Albumin (g/dL)   Date Value   09/22/2021 3.4   10/03/2016 3.2 (L)      Blood Glucose  Glucose (mg/dL)   Date Value   09/22/2021 86   10/03/2016 101 (H)    Hepatic  ALT (U/L)   Date Value   09/22/2021 16   10/03/2016 19     AST (U/L)   Date Value   09/22/2021 10   10/03/2016 17     Alkaline Phosphatase (U/L)   Date Value   09/22/2021 158 (H)   10/03/2016 106     Bilirubin  "Total (mg/dL)   Date Value   09/22/2021 0.3   10/03/2016 0.6    Additional  Triglycerides (mg/dL)   Date Value   09/23/2021 142   06/26/2015 114     Ketones Urine (mg/dL)   Date Value   10/04/2016 Negative        Vitamin D, Total (25-Hydroxy)   Date Value Ref Range Status   09/23/2021 12 (L) 20 - 75 ug/L Final     Vitamin B12   Date Value Ref Range Status   09/23/2021 316 193 - 986 pg/mL Final       MEDICATIONS    aspirin  81 mg Oral Daily     calcium carbonate 600 mg-vitamin D 400 units  1 tablet Oral Daily     DULoxetine  60 mg Oral Daily     levothyroxine  75 mcg Oral Daily     pantoprazole  40 mg Oral Daily        ANTHROPOMETRICS  Height: 152.4 cm (5' 0\")  Most Recent Weight: 58.7 kg (129 lb 8 oz)    IBW: 45.5 kg  Body mass index is 25.29 kg/m .  BMI: Overweight BMI 25-29.9  Weight History:   Wt Readings from Last 10 Encounters:   09/22/21 58.7 kg (129 lb 8 oz)   09/22/21 59 kg (130 lb)   10/03/16 84.5 kg (186 lb 4.8 oz)   10/02/16 84.9 kg (187 lb 3.2 oz)   09/30/16 85.3 kg (188 lb)   06/27/16 86.2 kg (190 lb)   05/27/16 86.6 kg (191 lb)   04/08/16 83.5 kg (184 lb)   03/01/16 82.4 kg (181 lb 9.6 oz)   02/24/16 83.9 kg (185 lb)     Weight loss noted over past 5 years, but unable to locate more recent weight records     Dosing Weight: 49 kg, adjusted based on current weight of 58.7 kg and IBW of 45.5 kg    ASSESSED NUTRITION NEEDS  Estimated Energy Needs: 4606-0002 kcals/day (25 - 30 kcals/kg)  Justification: Overweight  Estimated Protein Needs: 49-59 grams protein/day (1 - 1.2 grams of pro/kg)  Justification: Maintenance  Estimated Fluid Needs: 8165-0950 mL/day (1 mL/kcal)   Justification: Maintenance    MALNUTRITION  % Intake: Unable to assess  % Weight Loss: Unable to assess  Subcutaneous Fat Loss: Unable to assess  Muscle Loss: Unable to assess  Fluid Accumulation/Edema: None noted  Malnutrition Diagnosis: Unable to determine     NUTRITION DIAGNOSIS  Inadequate oral intake related to mental health symptom " exacerbation as evidenced by pt report of weight loss and decreased PO PTA      INTERVENTIONS  Implementation  Nutrition Education: Unable to complete due to pt unavailable   Continue current diet orders    Goals  Patient to consume % of nutritionally adequate meal trays TID, or the equivalent with supplements/snacks.     Monitoring/Evaluation  Progress toward goals will be monitored and evaluated per protocol.  Milly Valencia RD, LD  ICU/5A/OB/Mental Health Pager (M-F): [phone]  On Call Pager (weekends only): [phone]

## 2021-09-24 PROCEDURE — 99232 SBSQ HOSP IP/OBS MODERATE 35: CPT | Performed by: PSYCHIATRY & NEUROLOGY

## 2021-09-24 PROCEDURE — 250N000013 HC RX MED GY IP 250 OP 250 PS 637: Performed by: PSYCHIATRY & NEUROLOGY

## 2021-09-24 PROCEDURE — 250N000013 HC RX MED GY IP 250 OP 250 PS 637: Performed by: PHYSICIAN ASSISTANT

## 2021-09-24 PROCEDURE — 124N000003 HC R&B MH SENIOR/ADOLESCENT

## 2021-09-24 RX ORDER — DULOXETIN HYDROCHLORIDE 30 MG/1
30 CAPSULE, DELAYED RELEASE ORAL AT BEDTIME
Status: DISCONTINUED | OUTPATIENT
Start: 2021-09-24 | End: 2021-09-27

## 2021-09-24 RX ORDER — HYDROXYZINE HYDROCHLORIDE 50 MG/1
50 TABLET, FILM COATED ORAL EVERY 4 HOURS PRN
Status: DISCONTINUED | OUTPATIENT
Start: 2021-09-24 | End: 2021-10-02 | Stop reason: HOSPADM

## 2021-09-24 RX ADMIN — ASPIRIN 81 MG: 81 TABLET, COATED ORAL at 09:44

## 2021-09-24 RX ADMIN — CALCIUM CARBONATE 600 MG (1,500 MG)-VITAMIN D3 400 UNIT TABLET 1 TABLET: at 09:45

## 2021-09-24 RX ADMIN — TRAZODONE HYDROCHLORIDE 50 MG: 50 TABLET ORAL at 20:55

## 2021-09-24 RX ADMIN — PANTOPRAZOLE SODIUM 40 MG: 40 TABLET, DELAYED RELEASE ORAL at 09:44

## 2021-09-24 RX ADMIN — LEVOTHYROXINE SODIUM 75 MCG: 75 TABLET ORAL at 09:44

## 2021-09-24 RX ADMIN — ATORVASTATIN CALCIUM 20 MG: 20 TABLET, FILM COATED ORAL at 20:55

## 2021-09-24 RX ADMIN — DULOXETINE HYDROCHLORIDE 60 MG: 60 CAPSULE, DELAYED RELEASE ORAL at 09:44

## 2021-09-24 RX ADMIN — ACETAMINOPHEN 650 MG: 325 TABLET, FILM COATED ORAL at 10:02

## 2021-09-24 RX ADMIN — DULOXETINE HYDROCHLORIDE 30 MG: 30 CAPSULE, DELAYED RELEASE ORAL at 20:55

## 2021-09-24 ASSESSMENT — ACTIVITIES OF DAILY LIVING (ADL)
HYGIENE/GROOMING: INDEPENDENT
ORAL_HYGIENE: INDEPENDENT
LAUNDRY: UNABLE TO COMPLETE
DRESS: INDEPENDENT

## 2021-09-24 NOTE — PLAN OF CARE
"  Problem: Depression  Goal: Improved Mood  Outcome: Improving     Problem: Cognitive Impairment (Anxiety Signs/Symptoms)  Goal: Optimized Cognitive Function (Anxiety Signs/Symptoms)  Outcome: Improving  Flowsheets (Taken 9/24/2021 1348)  Mutually Determined Action Steps (Optimized Cognitive Function): contributes to treatment plan     Patient alert and oriented x4. Reported mild chronic lower back pain - PRN tylenol given - some relive.   Patient social with staff and peers but did not attend any group activities today due to feeling generalized weakness. \" I feel my body weak today\"   Mood is calm and cooperative, with full range affect. Patient denies SI \"I would not try to do anything. My family and friends are mad at me for what I did\". Patient denies SIB and hallucinations.   Appetite is good. Patient ambulates independently using walker.   "

## 2021-09-24 NOTE — PLAN OF CARE
"  Problem: Suicidal Behavior  Goal: Suicidal Behavior is Absent or Managed  Outcome: Improving  Note: Patient denied suicidal thoughts or wishing to be dead. Contracted for safety on the unit. She said she feels \"really down today\", rated depression at 8/01, anxiety at 9/10.   She verbalized being worried about her unknown future, not knowing where she could stay after discharge. Patient said she does not count on her son and daughter to help her. Said she has cousins in [place], who are her age, but that she was unable to find a place to live that will accept her little dog. (the dog is with her daughter currently).     Thoughts are focused on being depressed and hopeless, but clear and logical. Speech was soft and organized. Mood was depressed, affect was sad and tense. Memory appeared intact. Patient talked about her recent losses and the loss of her younger sister to cancer recently.     Behavior is appropriate. She remained out in the lounge, social with some other patients. Did not attend groups, but said she will do it tomorrow. Keeps self clean and well groomed. Patient has good appetite, ate about 75% of dinner and 100% of HS snack. She reported problems with vomiting due to gastric bypass and further stomach reconstruction surgeries. Patient said she has problems when consuming specific foods, like rice and pasta. She said she is well aware of the foods hat should avoid, and is able to make the right food choices. Patient has dentures, she was given denture cleaning and adhesive supply. He said she is [place] bilaterally, but was able to hear well during the assessment interview.   Patient wears prescription glasses, said her vision is decreasing.     Patient takes medications as prescribed, No SE reported or assessed at this time.      Problem: Fall Injury Risk  Goal: Absence of Fall and Fall-Related Injury  Outcome: Improving  Note: Patient continued to use a walker on the unit and in her room. She still " Stable. Observe for now. reports feeling dizzy at times. Still reports low back pain, but declined PRN.   Patient was educated on using the walker, asking staff for assist and help when dizzy. Patient verbalized understanding. At this time, patient is using the walker and communicates to staff well.     Addendum: patient took PRN Trazodone 50 mg PO at HS per request.

## 2021-09-24 NOTE — PLAN OF CARE
Assessment/Intervention/Current Symptoms and Care Coordination  The patient's care was discussed with the treatment team and chart notes were reviewed      Discharge Plan or Goal  Discharge to home with family. Patient will be referred to a new psychiatrist and therapist for aftercares.     Barriers to Discharge   Needs further psychiatric evaluation and stabilization.     Referral Status  None today.    Legal Status  Vol.

## 2021-09-24 NOTE — PROGRESS NOTES
"[facility], [place]   Psychiatric Progress Note        Interim History:   The patient's care was discussed with the treatment team during the daily team meeting and/or staff's chart notes were reviewed.  Staff report patient was endorsing depression of 8/10 and anxiety at 9/10.     The patient reports that she was feeling very depressed yesterday and stayed in bed all day. Still feeling depressed. Denies SI but has \"thoughts of not being useful.\" Sleeping and eating well. Does want to increase Cymbalta to PTA dose. Says that she has not been on Seroquel recently even though it is in pharmacy [facility]. Will speak to her family today and is not looking forward to this.          Medications:       aspirin  81 mg Oral Daily     atorvastatin  20 mg Oral QPM     calcium carbonate 600 mg-vitamin D 400 units  1 tablet Oral Daily     DULoxetine  60 mg Oral Daily     levothyroxine  75 mcg Oral Daily     pantoprazole  40 mg Oral Daily          Allergies:     Allergies   Allergen Reactions     Monosodium Glutamate      Morphine Hcl Rash     Nicotine Polacrilex [Nicotine] Rash     Gets a rash from nicotine patch. Pt is a smoker.           Labs:   No results found for this or any previous visit (from the past 24 hour(s)).       Psychiatric Examination:     /72   Pulse 89   Temp 96.9  F (36.1  C) (Temporal)   Resp 16   Ht 1.524 m (5')   Wt 58.4 kg (128 lb 12.8 oz)   SpO2 97%   BMI 25.15 kg/m    Weight is 128 lbs 12.8 oz  Body mass index is 25.15 kg/m .  Orthostatic Vitals       Most Recent      Standing Orthostatic /69 09/24 0845    Standing Orthostatic Pulse (bpm) 92 09/24 0845            Appearance: awake, alert and adequately groomed  Attitude:  cooperative  Eye Contact:  good  Mood:  depressed  Affect:  mood congruent  Speech:  clear, coherent  Psychomotor Behavior:  no evidence of tardive dyskinesia, dystonia, or tics  Thought Process:  goal oriented  Associations:  no loose " associations  Thought Content:  no evidence of suicidal ideation or homicidal ideation and no evidence of psychotic thought  Insight:  fair  Judgement:  intact  Oriented to:  time, person, and place  Attention Span and Concentration:  intact  Recent and Remote Memory:  intact    Clinical Global Impressions  First:  Considering your total clinical experience with this particular patient population, how severe are the patient's symptoms at this time?: 7 (09/23/21 0455)  Compared to the patient's condition at the START of treatment, this patient's condition is: 4 (09/23/21 0455)  Most recent:  Considering your total clinical experience with this particular patient population, how severe are the patient's symptoms at this time?: 7 (09/23/21 0455)  Compared to the patient's condition at the START of treatment, this patient's condition is: 4 (09/23/21 0455)         Precautions:     Behavioral Orders   Procedures     Code 1 - Restrict to Unit     Fall precautions     Routine Programming     As clinically indicated     Status 15     Every 15 minutes.     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 Titrate to PTA dose of 60mg BID.   2) Patient denies being on Seroquel recently.   3) Wants to be able to have her dog wherever she moves to.       Disposition Plan   Reason for ongoing admission: poses an imminent risk to self  Discharge location: TBD  Discharge Medications: not ordered  Follow-up Appointments: not scheduled  Legal Status: voluntary    Entered by: Avery Mcdermott on September 24, 2021 at 9:11 AM

## 2021-09-25 PROCEDURE — 250N000013 HC RX MED GY IP 250 OP 250 PS 637: Performed by: PSYCHIATRY & NEUROLOGY

## 2021-09-25 PROCEDURE — 250N000013 HC RX MED GY IP 250 OP 250 PS 637: Performed by: PHYSICIAN ASSISTANT

## 2021-09-25 PROCEDURE — H2032 ACTIVITY THERAPY, PER 15 MIN: HCPCS

## 2021-09-25 PROCEDURE — 124N000003 HC R&B MH SENIOR/ADOLESCENT

## 2021-09-25 PROCEDURE — 250N000013 HC RX MED GY IP 250 OP 250 PS 637: Performed by: NURSE PRACTITIONER

## 2021-09-25 RX ADMIN — CALCIUM CARBONATE 600 MG (1,500 MG)-VITAMIN D3 400 UNIT TABLET 1 TABLET: at 08:48

## 2021-09-25 RX ADMIN — MICONAZOLE NITRATE: 20 POWDER TOPICAL at 21:03

## 2021-09-25 RX ADMIN — DULOXETINE HYDROCHLORIDE 30 MG: 30 CAPSULE, DELAYED RELEASE ORAL at 21:02

## 2021-09-25 RX ADMIN — PANTOPRAZOLE SODIUM 40 MG: 40 TABLET, DELAYED RELEASE ORAL at 08:48

## 2021-09-25 RX ADMIN — ASPIRIN 81 MG: 81 TABLET, COATED ORAL at 08:48

## 2021-09-25 RX ADMIN — ATORVASTATIN CALCIUM 20 MG: 20 TABLET, FILM COATED ORAL at 21:02

## 2021-09-25 RX ADMIN — HYDROXYZINE HYDROCHLORIDE 50 MG: 50 TABLET, FILM COATED ORAL at 09:25

## 2021-09-25 RX ADMIN — LEVOTHYROXINE SODIUM 75 MCG: 75 TABLET ORAL at 08:48

## 2021-09-25 RX ADMIN — DULOXETINE HYDROCHLORIDE 60 MG: 60 CAPSULE, DELAYED RELEASE ORAL at 08:48

## 2021-09-25 RX ADMIN — TRAZODONE HYDROCHLORIDE 50 MG: 50 TABLET ORAL at 21:02

## 2021-09-25 ASSESSMENT — ACTIVITIES OF DAILY LIVING (ADL)
HYGIENE/GROOMING: INDEPENDENT
LAUNDRY: UNABLE TO COMPLETE
DRESS: SCRUBS (BEHAVIORAL HEALTH)
ORAL_HYGIENE: INDEPENDENT
ORAL_HYGIENE: INDEPENDENT
LAUNDRY: UNABLE TO COMPLETE
HYGIENE/GROOMING: INDEPENDENT
DRESS: SCRUBS (BEHAVIORAL HEALTH)

## 2021-09-25 NOTE — PLAN OF CARE
Pt presents with sad affect and depressed, anxious mood. Denies SI/SIB and hallucinations. Rates depression and anxiety both 9/10. Pt endorses feelings of being worthless. States that she believes that she has gotten worse since being hospitalized. Offered pt reassurance and provided PRN hydroxyzine which was effective. She also reports that her plan to call her family today was causing her increased anxiety. Pt was present in the milieu most of the shift and social with a group of peers. Denies pain. VSS. Medication compliant. Appetite and fluid intake adequate. Internal medicine contacted regarding possible yeast infection under her panniculus and miconazole powder was ordered. Pt gait steady and balanced with use of walker. No other concerns or complaints noted.

## 2021-09-25 NOTE — PLAN OF CARE
"  Problem: Activity and Energy Impairment (Anxiety Signs/Symptoms)  Goal: Optimized Energy Level (Anxiety Signs/Symptoms)  Outcome: No Change  Intervention: Optimize Energy Level  Recent Flowsheet Documentation  Taken 9/24/2021 1900 by Erna Kendrick RN  Activity (Behavioral Health): up ad umberto     Problem: Depression  Goal: Improved Mood  Outcome: Improving  Pt presents with a full range affect, reports mood as \"feeling low\". States this is because she has been thinking about the things she has done in life, and things that have happened to her, \"many deaths in the family\". She was visible in the milieu, social with staff and select peers. Endorsed anxiety as \"very high, 8/10, depression 6/10. Pt states anxiety is high because she is worried about housing. States she is hoping she will get assistance with her goal prior to discharge on finding a place where she can go live with her dog \"Neel\". Pt denies any forms of hallucinations, denies SI/SIB/HI/wish to be death. States she is hopeful.   Pt eating and drinking adequately, medication compliant. Overall very pleasant and appreciative of services.  C/o headache, states it is probably because she uses just two pillows here. Per pt she uses 3 pillows at home.   PRN  Trazodone 50mg     "

## 2021-09-26 PROCEDURE — 250N000013 HC RX MED GY IP 250 OP 250 PS 637: Performed by: PHYSICIAN ASSISTANT

## 2021-09-26 PROCEDURE — 250N000013 HC RX MED GY IP 250 OP 250 PS 637: Performed by: PSYCHIATRY & NEUROLOGY

## 2021-09-26 PROCEDURE — H2032 ACTIVITY THERAPY, PER 15 MIN: HCPCS

## 2021-09-26 PROCEDURE — 124N000003 HC R&B MH SENIOR/ADOLESCENT

## 2021-09-26 RX ADMIN — DULOXETINE HYDROCHLORIDE 30 MG: 30 CAPSULE, DELAYED RELEASE ORAL at 21:38

## 2021-09-26 RX ADMIN — DULOXETINE HYDROCHLORIDE 60 MG: 60 CAPSULE, DELAYED RELEASE ORAL at 08:35

## 2021-09-26 RX ADMIN — ATORVASTATIN CALCIUM 20 MG: 20 TABLET, FILM COATED ORAL at 21:38

## 2021-09-26 RX ADMIN — LEVOTHYROXINE SODIUM 75 MCG: 75 TABLET ORAL at 08:35

## 2021-09-26 RX ADMIN — MICONAZOLE NITRATE: 20 POWDER TOPICAL at 08:37

## 2021-09-26 RX ADMIN — CALCIUM CARBONATE 600 MG (1,500 MG)-VITAMIN D3 400 UNIT TABLET 1 TABLET: at 08:35

## 2021-09-26 RX ADMIN — PANTOPRAZOLE SODIUM 40 MG: 40 TABLET, DELAYED RELEASE ORAL at 08:35

## 2021-09-26 RX ADMIN — ASPIRIN 81 MG: 81 TABLET, COATED ORAL at 08:35

## 2021-09-26 RX ADMIN — TRAZODONE HYDROCHLORIDE 50 MG: 50 TABLET ORAL at 21:38

## 2021-09-26 ASSESSMENT — ACTIVITIES OF DAILY LIVING (ADL)
HYGIENE/GROOMING: INDEPENDENT
DRESS: SCRUBS (BEHAVIORAL HEALTH)
LAUNDRY: WITH SUPERVISION
DRESS: SCRUBS (BEHAVIORAL HEALTH)
LAUNDRY: UNABLE TO COMPLETE
ORAL_HYGIENE: INDEPENDENT
ORAL_HYGIENE: INDEPENDENT
HYGIENE/GROOMING: INDEPENDENT

## 2021-09-26 ASSESSMENT — MIFFLIN-ST. JEOR: SCORE: 1014.8

## 2021-09-26 NOTE — PLAN OF CARE
"  Problem: Depression  Goal: Improved Mood  Outcome: Improving       Full range affect, mood is calm. Pt active in milieu, social with staff and peers. Pt taking fluids and food freely. When asked about depression pt verbalized \"not depressed but I feel like sitting in a chair and reading my book\". Pt verbalized her anxiety if better compared to yesterday when she called her family \"they didn't know where I was\".  Area under abdominal skin folds cleaned, dried and miconazole power applied, area slightly red with slight odor, pt denies itching.     Sitting: /58  P 91  Standing: BP 98/64 P 99  Pt denies feeling dizzy/light headed. Gait slow and steady with use of walker. Fluids encouraged. Pt educated on orthostatic hypotension, proper hydration, changing positions slowly and asking for assistance when needed, pt verbalized and demonstrated understanding.     Recheck  Sitting: /69 P 81  Standing: /75 P 88    "

## 2021-09-26 NOTE — PLAN OF CARE
Pt presents with sad affect and depressed, anxious mood. Denies SI/SIB and hallucinations. Rates depression 9/10 and anxiety 6/10. Pt was present in the lounge most of the shift. Social with her roommate, reports that she really gets along with her well. Attended art group. Appetite and fluid intake adequate. Denies pain. VSS. Medication compliant. Linens changed this evening. Pt did speak with her family this evening, reports that it went better than what she expected. No other concerns or complaints noted.

## 2021-09-26 NOTE — PLAN OF CARE
Problem: General Rehab Plan of Care  Goal: Therapeutic Recreation/Music Therapy Goal  Description: The patient and/or their representative will achieve their patient-specific goals related to the plan of care.  The patient-specific goals include:  Outcome: No Change     Sera attended art therapy group for 1 hour (8 patients total). She engaged in group discussion about positive relationships. She participated in the art directive to make collaborative art about relationships. She interacted appropriately with peer and created positive imagery about contributing to her community through volunteer work. She shared her art with the group and appeared pleased with her work.

## 2021-09-27 PROCEDURE — 124N000003 HC R&B MH SENIOR/ADOLESCENT

## 2021-09-27 PROCEDURE — 250N000013 HC RX MED GY IP 250 OP 250 PS 637: Performed by: PHYSICIAN ASSISTANT

## 2021-09-27 PROCEDURE — 250N000013 HC RX MED GY IP 250 OP 250 PS 637: Performed by: PSYCHIATRY & NEUROLOGY

## 2021-09-27 PROCEDURE — H2032 ACTIVITY THERAPY, PER 15 MIN: HCPCS

## 2021-09-27 PROCEDURE — 99232 SBSQ HOSP IP/OBS MODERATE 35: CPT | Performed by: PSYCHIATRY & NEUROLOGY

## 2021-09-27 RX ORDER — DULOXETIN HYDROCHLORIDE 60 MG/1
60 CAPSULE, DELAYED RELEASE ORAL 2 TIMES DAILY
Status: DISCONTINUED | OUTPATIENT
Start: 2021-09-27 | End: 2021-10-02 | Stop reason: HOSPADM

## 2021-09-27 RX ADMIN — CALCIUM CARBONATE 600 MG (1,500 MG)-VITAMIN D3 400 UNIT TABLET 1 TABLET: at 09:09

## 2021-09-27 RX ADMIN — DULOXETINE HYDROCHLORIDE 60 MG: 60 CAPSULE, DELAYED RELEASE ORAL at 09:09

## 2021-09-27 RX ADMIN — DULOXETINE HYDROCHLORIDE 60 MG: 60 CAPSULE, DELAYED RELEASE ORAL at 21:24

## 2021-09-27 RX ADMIN — ASPIRIN 81 MG: 81 TABLET, COATED ORAL at 09:09

## 2021-09-27 RX ADMIN — ATORVASTATIN CALCIUM 20 MG: 20 TABLET, FILM COATED ORAL at 21:24

## 2021-09-27 RX ADMIN — PANTOPRAZOLE SODIUM 40 MG: 40 TABLET, DELAYED RELEASE ORAL at 09:09

## 2021-09-27 RX ADMIN — TRAZODONE HYDROCHLORIDE 50 MG: 50 TABLET ORAL at 21:24

## 2021-09-27 RX ADMIN — LEVOTHYROXINE SODIUM 75 MCG: 75 TABLET ORAL at 09:09

## 2021-09-27 ASSESSMENT — ACTIVITIES OF DAILY LIVING (ADL)
HYGIENE/GROOMING: INDEPENDENT
DRESS: SCRUBS (BEHAVIORAL HEALTH)
LAUNDRY: WITH SUPERVISION
ORAL_HYGIENE: INDEPENDENT

## 2021-09-27 NOTE — PROGRESS NOTES
"[facility], [place]   Psychiatric Progress Note        Interim History:   The patient's care was discussed with the treatment team during the daily team meeting and/or staff's chart notes were reviewed.  Staff report patient reports being \"down\" and rates anxiety at 6/10. Has been better.     The patient reports that she contacted her family and that went \"surprisingly well.\" Says that they had been concerned about her well being and this was \"nice.\" Sleeping and eating well. Still feels that she is in \"a bad place\" and still depressed about her sister's death. Wants to get her dog as a service animal. Hopeful to get a place in [place] but will speak to her family about possibly being able to stay with them. Would like to get back on PTA dose of Cymbalta. Denies SI.          Medications:       aspirin  81 mg Oral Daily     atorvastatin  20 mg Oral QPM     calcium carbonate 600 mg-vitamin D 400 units  1 tablet Oral Daily     DULoxetine  60 mg Oral BID     levothyroxine  75 mcg Oral Daily     miconazole   Topical BID     pantoprazole  40 mg Oral Daily          Allergies:     Allergies   Allergen Reactions     Monosodium Glutamate      Morphine Hcl Rash     Nicotine Polacrilex [Nicotine] Rash     Gets a rash from nicotine patch. Pt is a smoker.           Labs:   No results found for this or any previous visit (from the past 24 hour(s)).       Psychiatric Examination:     /68   Pulse 86   Temp 97.2  F (36.2  C) (Temporal)   Resp 16   Ht 1.524 m (5')   Wt 59.3 kg (130 lb 12.8 oz)   SpO2 96%   BMI 25.55 kg/m    Weight is 130 lbs 12.8 oz  Body mass index is 25.55 kg/m .  Orthostatic Vitals       Most Recent      Standing Orthostatic /69 09/24 0845    Standing Orthostatic Pulse (bpm) 92 09/24 0845            Appearance: awake, alert and adequately groomed  Attitude:  cooperative  Eye Contact:  good  Mood:  depressed  Affect:  mood congruent  Speech:  clear, " coherent  Psychomotor Behavior:  no evidence of tardive dyskinesia, dystonia, or tics  Thought Process:  goal oriented  Associations:  no loose associations  Thought Content:  no evidence of suicidal ideation or homicidal ideation and no evidence of psychotic thought  Insight:  fair  Judgement:  intact  Oriented to:  time, person, and place  Attention Span and Concentration:  intact  Recent and Remote Memory:  intact    Clinical Global Impressions  First:  Considering your total clinical experience with this particular patient population, how severe are the patient's symptoms at this time?: 7 (09/23/21 0455)  Compared to the patient's condition at the START of treatment, this patient's condition is: 4 (09/23/21 0455)  Most recent:  Considering your total clinical experience with this particular patient population, how severe are the patient's symptoms at this time?: 7 (09/23/21 0455)  Compared to the patient's condition at the START of treatment, this patient's condition is: 4 (09/23/21 0455)         Precautions:     Behavioral Orders   Procedures     Code 1 - Restrict to Unit     Fall precautions     Routine Programming     As clinically indicated     Status 15     Every 15 minutes.     Suicide precautions     Patients on Suicide Precautions should have a Combination Diet ordered that includes a Diet selection(s) AND a Behavioral Tray selection for Safe Tray - with utensils, or Safe Tray - NO utensils            Diagnoses:     MDD, recurrent, severe without psychosis         Plan:     1) Increase Cymbalta to 60mg BID.   2) May be able to return to family's home short term.   3) Wants to be able to have her dog wherever she moves to.       Disposition Plan   Reason for ongoing admission: poses an imminent risk to self  Discharge location: TBD  Discharge Medications: not ordered  Follow-up Appointments: not scheduled  Legal Status: voluntary    Entered by: Avery Mcdermott on September 27, 2021 at 9:21 AM

## 2021-09-27 NOTE — PLAN OF CARE
Assessment/Intervention/Current Symptoms and Care Coordination  The patient's care was discussed with the treatment team and chart notes were reviewed        Discharge Plan or Goal  Discharge to home with family. Patient will be referred to a new psychiatrist and therapist in [place] for aftercares.      Barriers to Discharge   Needs further psychiatric evaluation and stabilization.      Referral Status  None today.     Legal Status  Vol.

## 2021-09-27 NOTE — PLAN OF CARE
Pt presents with full range affect and calm mood. Denies SI/SIB and hallucinations. Endorses some depression regarding her situation, anxiety is low. Pt was present in the milieu and social with some select peers. She participated in groups and watched television this evening. Appetite and fluid intake adequate. Refused shower this evening. Denies pain. Medication compliant. VSS. No other concerns or complaints noted.

## 2021-09-27 NOTE — PROVIDER NOTIFICATION
09/27/21 1156   OTHER   Covid Vaccine Screen Patient declines Covid Vaccine   Patient reports that she has already received the vaccine

## 2021-09-27 NOTE — PLAN OF CARE
"  Problem: Suicidal Behavior  Goal: Suicidal Behavior is Absent or Managed  Outcome: Improving     Problem: Mood Impairment (Anxiety Signs/Symptoms)  Goal: Improved Mood Symptoms (Anxiety Signs/Symptoms)  Outcome: Improving  Pt rated anxiety at 6/10 this AM (10 being the worst). Pt unable to rate depression but is able to identify \"its a little better than yesterday\". Pt denies SI/SIB/wishes to be dead. Pt has been visible in common area's reading and has attended some programming.  Pt denies pain.     "

## 2021-09-27 NOTE — PLAN OF CARE
Pt actively participated in a structured Therapeutic Recreation group with a focus on leisure participation and exercise. Pt participated in the guided exercise for the full duration of the group. Pt followed along, engaged in the guided chair exercise routine.  Pt was encouraged to use positive imagery with the deep breathing and stretching to foster relaxation, improves focus, and reduce stress.

## 2021-09-28 PROCEDURE — 250N000013 HC RX MED GY IP 250 OP 250 PS 637: Performed by: PSYCHIATRY & NEUROLOGY

## 2021-09-28 PROCEDURE — H2032 ACTIVITY THERAPY, PER 15 MIN: HCPCS

## 2021-09-28 PROCEDURE — G0177 OPPS/PHP; TRAIN & EDUC SERV: HCPCS

## 2021-09-28 PROCEDURE — 250N000013 HC RX MED GY IP 250 OP 250 PS 637: Performed by: PHYSICIAN ASSISTANT

## 2021-09-28 PROCEDURE — 124N000003 HC R&B MH SENIOR/ADOLESCENT

## 2021-09-28 RX ORDER — TRAZODONE HYDROCHLORIDE 100 MG/1
100 TABLET ORAL
Status: DISCONTINUED | OUTPATIENT
Start: 2021-09-28 | End: 2021-10-02 | Stop reason: HOSPADM

## 2021-09-28 RX ADMIN — TRAZODONE HYDROCHLORIDE 100 MG: 100 TABLET ORAL at 21:08

## 2021-09-28 RX ADMIN — DULOXETINE HYDROCHLORIDE 60 MG: 60 CAPSULE, DELAYED RELEASE ORAL at 08:19

## 2021-09-28 RX ADMIN — ATORVASTATIN CALCIUM 20 MG: 20 TABLET, FILM COATED ORAL at 21:08

## 2021-09-28 RX ADMIN — ACETAMINOPHEN 650 MG: 325 TABLET, FILM COATED ORAL at 11:39

## 2021-09-28 RX ADMIN — ASPIRIN 81 MG: 81 TABLET, COATED ORAL at 08:20

## 2021-09-28 RX ADMIN — CALCIUM CARBONATE 600 MG (1,500 MG)-VITAMIN D3 400 UNIT TABLET 1 TABLET: at 08:20

## 2021-09-28 RX ADMIN — DULOXETINE HYDROCHLORIDE 60 MG: 60 CAPSULE, DELAYED RELEASE ORAL at 21:08

## 2021-09-28 RX ADMIN — LEVOTHYROXINE SODIUM 75 MCG: 75 TABLET ORAL at 08:19

## 2021-09-28 RX ADMIN — PANTOPRAZOLE SODIUM 40 MG: 40 TABLET, DELAYED RELEASE ORAL at 08:20

## 2021-09-28 RX ADMIN — MICONAZOLE NITRATE: 20 POWDER TOPICAL at 21:08

## 2021-09-28 ASSESSMENT — ACTIVITIES OF DAILY LIVING (ADL)
ORAL_HYGIENE: INDEPENDENT
DRESS: INDEPENDENT;SCRUBS (BEHAVIORAL HEALTH)
DRESS: SCRUBS (BEHAVIORAL HEALTH)
ORAL_HYGIENE: INDEPENDENT
HYGIENE/GROOMING: INDEPENDENT
HYGIENE/GROOMING: INDEPENDENT
LAUNDRY: WITH SUPERVISION

## 2021-09-28 ASSESSMENT — MIFFLIN-ST. JEOR: SCORE: 1011.18

## 2021-09-28 NOTE — PLAN OF CARE
Problem: Mood Impairment (Anxiety Signs/Symptoms)  Goal: Improved Mood Symptoms (Anxiety Signs/Symptoms)  Outcome: No Change  Flowsheets (Taken 9/28/2021 2836)  Mutually Determined Action Steps (Improved Mood Symptoms):   names external triggers   adheres to medication regimen

## 2021-09-28 NOTE — PLAN OF CARE
"Music Therapy Group note    Clinical Hours in session: 2.0    Number of patients in group: 5    Scope of service: behavioral     Patient progress: initial encounter     Intervention: Music of the Ages, Music Bingo     Goal of group: social, cognition    Patient response/reaction to treatment intervention(s): \"Aracely\" checked in feeling \"anxious\" to groups today, but brightened and lightened as groups progressed.  She responded well to the music, moving to the beat and engaging with it well.  Positive group participant both sessions.     Brittany Zhao, [facility]  Board-Certified Music Therapist           "

## 2021-09-28 NOTE — PLAN OF CARE
Pt presents with full range affect and anxious mood. Denies SI/SIB and hallucinations. Denies depression, rates anxiety 4/10. Pt was present in the milieu and social with peers. Read her book in the lounge and attended groups. Pt reports that she needs to see the  about getting into assisted living or housing. States that she can return to one of her children's homes in the interim but ultimately she needs a timeline to give to them. She reports that she needs to get into [employer] as well - admits to having a problem and needing to get help. States that she does not go frequently, but when she does, she spends everything. Pt states that everything turned upside down very quickly but she is hopeful to get things right again. Denies pain. VSS. Medication compliant, PRN Trazodone given at HS - hopes that MD can increase the dosage tomorrow. Appetite and fluid intake adequate. No other concerns or complaints noted.

## 2021-09-28 NOTE — PLAN OF CARE
Problem: Sleep Disturbance (Anxiety Signs/Symptoms)  Goal: Improved Sleep (Anxiety Signs/Symptoms)  Outcome: Improving     Slept all night for 8 hours.  No complaints/ requests made

## 2021-09-28 NOTE — PROGRESS NOTES
Behavioral Health  Note    Behavioral Health  Spirituality Group Note    UNIT [facility]    Name: Sera Figueroa YOB: 1945   MRN: [id number] Age: 76 year old      Patient attended -led group, which included discussion of spirituality, coping with illness and gratitude.    Patient attended group for 1.0 hrs.    The patient actively participated in group discussion and patient demonstrated an appreciation of topic's application for their personal circumstances.    Jeannette Garrido MDiv  Associate   Pager [phone]  Office [phone]

## 2021-09-28 NOTE — PLAN OF CARE
"INITIAL OT NOTE  Problem: OT General Care Plan  Goal: OT Goal 1  Description: Will attend OT groups and participate actively in all OT opportunities. Will assess and set goals.    Pt attended 1 out of 2 OT groups offered. Pt actively participated in a structured occupational therapy group of 7 patients total x60 minutes with a discussion focused on various mental health topics, including mental health management, social situations, healthy support systems, healthy mind/body, and activities of daily living. Pt responded to prompts thoughtfully and insightfully. Demonstrated active listening when peers were sharing, and when a peer was sharing a story about using gambling to avoid a stressful situation, she commented \"amen!\" Calm, pleasant, cooperative, and engaged. Affect appeared to brighten in interactions. Will continue to assess. Initial assessment to be completed upon additional group participation.     "

## 2021-09-29 PROCEDURE — H2032 ACTIVITY THERAPY, PER 15 MIN: HCPCS

## 2021-09-29 PROCEDURE — G0177 OPPS/PHP; TRAIN & EDUC SERV: HCPCS

## 2021-09-29 PROCEDURE — 90853 GROUP PSYCHOTHERAPY: CPT

## 2021-09-29 PROCEDURE — 99232 SBSQ HOSP IP/OBS MODERATE 35: CPT | Performed by: PSYCHIATRY & NEUROLOGY

## 2021-09-29 PROCEDURE — 250N000013 HC RX MED GY IP 250 OP 250 PS 637: Performed by: PSYCHIATRY & NEUROLOGY

## 2021-09-29 PROCEDURE — 124N000003 HC R&B MH SENIOR/ADOLESCENT

## 2021-09-29 PROCEDURE — 250N000013 HC RX MED GY IP 250 OP 250 PS 637: Performed by: PHYSICIAN ASSISTANT

## 2021-09-29 RX ADMIN — DULOXETINE HYDROCHLORIDE 60 MG: 60 CAPSULE, DELAYED RELEASE ORAL at 08:13

## 2021-09-29 RX ADMIN — PANTOPRAZOLE SODIUM 40 MG: 40 TABLET, DELAYED RELEASE ORAL at 06:56

## 2021-09-29 RX ADMIN — ASPIRIN 81 MG: 81 TABLET, COATED ORAL at 08:13

## 2021-09-29 RX ADMIN — ATORVASTATIN CALCIUM 20 MG: 20 TABLET, FILM COATED ORAL at 20:25

## 2021-09-29 RX ADMIN — CALCIUM CARBONATE 600 MG (1,500 MG)-VITAMIN D3 400 UNIT TABLET 1 TABLET: at 08:13

## 2021-09-29 RX ADMIN — DULOXETINE HYDROCHLORIDE 60 MG: 60 CAPSULE, DELAYED RELEASE ORAL at 20:25

## 2021-09-29 RX ADMIN — LEVOTHYROXINE SODIUM 75 MCG: 75 TABLET ORAL at 06:56

## 2021-09-29 RX ADMIN — TRAZODONE HYDROCHLORIDE 100 MG: 100 TABLET ORAL at 23:52

## 2021-09-29 ASSESSMENT — ACTIVITIES OF DAILY LIVING (ADL)
HYGIENE/GROOMING: INDEPENDENT
ORAL_HYGIENE: INDEPENDENT
LAUNDRY: WITH SUPERVISION
LAUNDRY: UNABLE TO COMPLETE
DRESS: SCRUBS (BEHAVIORAL HEALTH);INDEPENDENT
HYGIENE/GROOMING: INDEPENDENT
ORAL_HYGIENE: INDEPENDENT
DRESS: INDEPENDENT;STREET CLOTHES;SCRUBS (BEHAVIORAL HEALTH)

## 2021-09-29 NOTE — PLAN OF CARE
Assessment/Intervention/Current Symptoms and Care Coordination  The patient's care was discussed with the treatment team and chart notes were reviewed. [facility] met with patient and discussed outpatient services and resources she may need. She notified writer that she wants to find herself an Assisted living facility. Writer gave her the contact information for Monik Vera from [facility].([phone]). Patient also asked for gambling addiction resources. Writer notified her that I will put some resources on her AVS. Writer also asked if she was interested in getting more information regarding financial power of  due to the patient continuance concern about her ability to manage her finances and her gambling addictions. She told writer she has been thinking about asking her daughter to be her financial POA but did not take any steps towards making that happen yet. [facility] notified her that I will be printing some information regarding this process so she can read and learn about it given the fact that this is a huge decision. Patient agreed. Patient also notified writer that she will be discharging to her daughter's home in the interim.         Discharge Plan or Goal  Discharge to home with family. Patient will be referred to a new psychiatrist and therapist in [place] for aftercares.      Barriers to Discharge   Needs further psychiatric evaluation and stabilization.      Referral Status  None today.     Legal Status  Vol

## 2021-09-29 NOTE — PLAN OF CARE
BEHAVIORAL TEAM DISCUSSION    Participants: Avery Mcdermott MD, Taryn Barrera, RN, Rain Rebolledo, LGSW, CTC, Camille Thapa, OT  Progress: Improving  Anticipated length of stay: 3-5 days  Continued Stay Criteria/Rationale: needs further psyschiatric evaluation and stabilization  Medical/Physical: see H&P and internal med notes.   Precautions:   Behavioral Orders   Procedures     Code 1 - Restrict to Unit     Fall precautions     Routine Programming     As clinically indicated     Status 15     Every 15 minutes.     Suicide precautions     Patients on Suicide Precautions should have a Combination Diet ordered that includes a Diet selection(s) AND a Behavioral Tray selection for Safe Tray - with utensils, or Safe Tray - NO utensils       Plan:   1) Continue Cymbalta 60mg BID.   2) May be able to return to family's home short term.   3) Continue Trazodone 100mg at bedtime.   Rationale for change in precautions or plan: continue with current plan.

## 2021-09-29 NOTE — PLAN OF CARE
Pt presents with blunted, flat affect and anxious mood, pt does brighten upon approach. Denies SI/SIB and hallucinations. Denies depression, reports having some mild anxiety due to pending discharge but it is much better than this morning. Reports feeling better after talking with CTC. Pt was present in the lounge but withdrawn to self reading [employer]. Attended groups. Reports feeling a little sad that her roommate left today but she was happy for her. VSS. Medication compliant. Appetite and fluid intake adequate. PRN HS Trazodone given (dose was increased to 100 mg today per MD Casey). No other concerns or complaints noted.

## 2021-09-29 NOTE — PLAN OF CARE
Problem: Sleep Disturbance  Goal: Adequate Sleep/Rest  Outcome: No Change     Pt slept well for 9 hours.  No concerns raised.

## 2021-09-29 NOTE — PLAN OF CARE
"  Problem: Suicidal Behavior  Goal: Suicidal Behavior is Absent or Managed  Outcome: No Change  Flowsheets (Taken 9/29/2021 1042)  Mutually Determined Action Steps (Suicidal Behavior Absent/Managed): sets future-oriented goal     Problem: Mood Impairment (Anxiety Signs/Symptoms)  Goal: Improved Mood Symptoms (Anxiety Signs/Symptoms)  Outcome: No Change    Pt rated anxiety at 6/10 this AM (where 10 is the worst). Pt is also reporting feeling more depressed this AM, however she was not able to rate this. Pt denies SI/SIB or active wishes to dies but does admit that sometimes she just \"doesn't want to be here\". Pt reports that her anxiety and depression are increased today as she knows she has a lot to do. Pt will be calling [facility] today to see what kind of housing she will be able to obtain and then talking with her daughter about her future plans.   Pt's affect is flat with appropriate eye contact. Pt is engaged during conversations.   Pt has been reading as a distraction tool.      "

## 2021-09-29 NOTE — PLAN OF CARE
"INITIAL OT ASSESSMENT  Problem: OT General Care Plan  Goal: OT Goal 1  Description: Will plan and organize steps, engage in more complex problem solving and attend to these steps successfully with attention to detail.        09/28/21 1500   General Information   Date Initially Attended OT 09/28/21   Clinical Impression   Affect Appropriate to situation   Orientation Oriented to person, place and time   Appearance and ADLs General cleanliness observed in most areas   Attention to Internal Stimuli No observed signs   Interaction Skills Interacts appropriately with staff;Interacts appropriately with peers   Ability to Communicate Needs Independent   Verbal Content Articulate;Clear;Appropriate to topic   Ability to Maintain Boundaries Maintains appropriate physical boundaries;Maintains appropriate verbal boundaries   Participation Initiates participation   Concentration Concentrates 30+ minutes   Ability to Concentrate Without difficulty;With structure   Follows and Comprehends Directions Independently follows multi-step directions   Memory Delayed and immediate recall intact   Organization Independently organizes all tasks   Decision Making Independent   Planning and Problem Solving Independently plans ahead   Ability to Apply and Learn Concepts Applies within group structure   Frustrations / Stress Tolerance Independently identifies sources of frustration/stress;Independently identifies skills    Level of Insight Insightful into needs, issues, goals   Self Esteem Can identify positives;Takes risks with support and encouragement;Accepts positive feedback   Social Supports Has knowledge of support systems  (\"my cousin in [place]\")     Pt attended 2 out of 2 OT groups offered. Pt actively participated in occupational therapy clinic with 4-5 patients total x60 minutes. Pt was able to ask for assistance as needed, and independently initiate a structured creative expression task. Pt demonstrated good focus, planning, and " "attention to detail. Social with peers and staff throughout, and shared that she learned to cook when she was 11 years old, and still really enjoys cooking. She described her dad as her \"hero,\" and shared about her supportive cousin. Pt actively participated in a structured occupational therapy group of 2 patients total x45 minutes involving a self-reflecting, group leisure task. Pt appeared comfortable sharing positive past experiences and personal information with peers, and was respectful in listening and responding to peers. She shared about international travel experiences related to her  being stationed in [employer]. Calm, pleasant, cooperative, and engaged throughout all groups. Bright affect.    Pt was given and completed a written self-assessment form. OT staff reviewed with pt and explained the value of having them involved in their treatment plan, and provided options to meet current needs/self-identified goals. Pt stated reason for admission was \"I felt worthless so I took a bunch of pills.\" Selected goals including deal with frustration more effectively, identify/express feelings better, improve problem solving and independence, and ask others for help/support when needed. Identified \"more open communication\" as something she would like to be different upon discharge. PLAN: Will provide structure, support, and encouragement. Offer education on coping strategies and life management skills. Assist pt to increase self awareness regarding mental health issues and expand ideas. Will assess further in the areas of organization, problem solving, and concentration.            "

## 2021-09-29 NOTE — PROGRESS NOTES
"[facility], [place]   Psychiatric Progress Note        Interim History:   The patient's care was discussed with the treatment team during the daily team meeting and/or staff's chart notes were reviewed.  Staff report patient has been a little anxious due to thoughts of staying with her family.     The patient reports that she is \"anxious but better.\" Mostly anxious about working on a place to live. Will call [facility] today and then speak to her daughter about staying with them as a transition. Sleeping better with the increase in Trazodone. Denies current SI but has some thoughts of not wanting to be a burden. Looking into [facility]. Will likely have her daughter handle her finances.          Medications:       aspirin  81 mg Oral Daily     atorvastatin  20 mg Oral QPM     calcium carbonate 600 mg-vitamin D 400 units  1 tablet Oral Daily     DULoxetine  60 mg Oral BID     levothyroxine  75 mcg Oral Daily     miconazole   Topical BID     pantoprazole  40 mg Oral Daily          Allergies:     Allergies   Allergen Reactions     Monosodium Glutamate      Morphine Hcl Rash     Nicotine Polacrilex [Nicotine] Rash     Gets a rash from nicotine patch. Pt is a smoker.           Labs:   No results found for this or any previous visit (from the past 24 hour(s)).       Psychiatric Examination:     BP 96/62   Pulse 78   Temp 97.7  F (36.5  C) (Temporal)   Resp 16   Ht 1.524 m (5')   Wt 59 kg (130 lb)   SpO2 97%   BMI 25.39 kg/m    Weight is 130 lbs 0 oz  Body mass index is 25.39 kg/m .  Orthostatic Vitals       Most Recent      Standing Orthostatic /69 09/24 0845    Standing Orthostatic Pulse (bpm) 92 09/24 0845            Appearance: awake, alert and adequately groomed  Attitude:  cooperative  Eye Contact:  good  Mood:  better  Affect:  mood congruent  Speech:  clear, coherent  Psychomotor Behavior:  no evidence of tardive dyskinesia, dystonia, or tics  Thought " Process:  goal oriented  Associations:  no loose associations  Thought Content:  no evidence of suicidal ideation or homicidal ideation and no evidence of psychotic thought  Insight:  fair  Judgement:  intact  Oriented to:  time, person, and place  Attention Span and Concentration:  intact  Recent and Remote Memory:  intact    Clinical Global Impressions  First:  Considering your total clinical experience with this particular patient population, how severe are the patient's symptoms at this time?: 7 (09/23/21 0455)  Compared to the patient's condition at the START of treatment, this patient's condition is: 4 (09/23/21 0455)  Most recent:  Considering your total clinical experience with this particular patient population, how severe are the patient's symptoms at this time?: 7 (09/23/21 0455)  Compared to the patient's condition at the START of treatment, this patient's condition is: 4 (09/23/21 0455)         Precautions:     Behavioral Orders   Procedures     Code 1 - Restrict to Unit     Fall precautions     Routine Programming     As clinically indicated     Status 15     Every 15 minutes.     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   2) May be able to return to family's home short term.   3) Continue Trazodone 100mg at bedtime.       Disposition Plan   Reason for ongoing admission: poses an imminent risk to self  Discharge location: TBD  Discharge Medications: not ordered  Follow-up Appointments: not scheduled  Legal Status: voluntary    Entered by: Avery Mcdermott on September 29, 2021 at 11:37 AM

## 2021-09-30 LAB — SARS-COV-2 RNA RESP QL NAA+PROBE: NEGATIVE

## 2021-09-30 PROCEDURE — 250N000013 HC RX MED GY IP 250 OP 250 PS 637: Performed by: PSYCHIATRY & NEUROLOGY

## 2021-09-30 PROCEDURE — 99232 SBSQ HOSP IP/OBS MODERATE 35: CPT | Performed by: PSYCHIATRY & NEUROLOGY

## 2021-09-30 PROCEDURE — 124N000003 HC R&B MH SENIOR/ADOLESCENT

## 2021-09-30 PROCEDURE — 250N000013 HC RX MED GY IP 250 OP 250 PS 637: Performed by: PHYSICIAN ASSISTANT

## 2021-09-30 PROCEDURE — U0005 INFEC AGEN DETEC AMPLI PROBE: HCPCS | Performed by: PSYCHIATRY & NEUROLOGY

## 2021-09-30 RX ADMIN — TRAZODONE HYDROCHLORIDE 100 MG: 100 TABLET ORAL at 21:26

## 2021-09-30 RX ADMIN — LEVOTHYROXINE SODIUM 75 MCG: 75 TABLET ORAL at 06:59

## 2021-09-30 RX ADMIN — MICONAZOLE NITRATE: 20 POWDER TOPICAL at 08:39

## 2021-09-30 RX ADMIN — CALCIUM CARBONATE 600 MG (1,500 MG)-VITAMIN D3 400 UNIT TABLET 1 TABLET: at 08:39

## 2021-09-30 RX ADMIN — PANTOPRAZOLE SODIUM 40 MG: 40 TABLET, DELAYED RELEASE ORAL at 06:59

## 2021-09-30 RX ADMIN — ASPIRIN 81 MG: 81 TABLET, COATED ORAL at 08:39

## 2021-09-30 RX ADMIN — ACETAMINOPHEN 650 MG: 325 TABLET, FILM COATED ORAL at 07:01

## 2021-09-30 RX ADMIN — DULOXETINE HYDROCHLORIDE 60 MG: 60 CAPSULE, DELAYED RELEASE ORAL at 08:39

## 2021-09-30 RX ADMIN — ATORVASTATIN CALCIUM 20 MG: 20 TABLET, FILM COATED ORAL at 21:26

## 2021-09-30 RX ADMIN — DULOXETINE HYDROCHLORIDE 60 MG: 60 CAPSULE, DELAYED RELEASE ORAL at 21:26

## 2021-09-30 ASSESSMENT — ACTIVITIES OF DAILY LIVING (ADL)
LAUNDRY: UNABLE TO COMPLETE
ORAL_HYGIENE: INDEPENDENT
DRESS: SCRUBS (BEHAVIORAL HEALTH)
HYGIENE/GROOMING: INDEPENDENT

## 2021-09-30 ASSESSMENT — MIFFLIN-ST. JEOR: SCORE: 1022.97

## 2021-09-30 NOTE — PLAN OF CARE
Assessment/Intervention/Current Symptoms and Care Coordination  The patient's care was discussed with the treatment team and chart notes were reviewed.      Discharge Plan or Goal  Discharge to home with family. Patient will be referred to a new psychiatrist and therapist in [place] for aftercares.      Barriers to Discharge   Needs further psychiatric evaluation and stabilization.      Referral Status  None today.     Legal Status  Vol

## 2021-09-30 NOTE — PROGRESS NOTES
"[facility], [place]   Psychiatric Progress Note        Interim History:   The patient's care was discussed with the treatment team during the daily team meeting and/or staff's chart notes were reviewed.  Staff report patient has been doing well. Is social. Sleeping well.     The patient reports that \"things are good and bad.\" Did have a good conversation with [employer] and will be able to stay with her daughter and her  short term. But her other daughter-in-law \"doesn't want me around and that hurts.\" Is feeling better in terms of her mood and denies any SI. Will have daughter manage her finances. Eating well. Didn't sleep as well last night but only got 50mg of Trazodone at first. Feels she would be safe to discharge to daughter's home on Saturday morning.          Medications:       aspirin  81 mg Oral Daily     atorvastatin  20 mg Oral QPM     calcium carbonate 600 mg-vitamin D 400 units  1 tablet Oral Daily     DULoxetine  60 mg Oral BID     levothyroxine  75 mcg Oral Daily     miconazole   Topical BID     pantoprazole  40 mg Oral Daily          Allergies:     Allergies   Allergen Reactions     Monosodium Glutamate      Morphine Hcl Rash     Nicotine Polacrilex [Nicotine] Rash     Gets a rash from nicotine patch. Pt is a smoker.           Labs:   No results found for this or any previous visit (from the past 24 hour(s)).       Psychiatric Examination:     /68   Pulse 85   Temp 98.3  F (36.8  C) (Oral)   Resp 16   Ht 1.524 m (5')   Wt 60.1 kg (132 lb 9.6 oz)   SpO2 97%   BMI 25.90 kg/m    Weight is 132 lbs 9.6 oz  Body mass index is 25.9 kg/m .  Orthostatic Vitals       Most Recent      Standing Orthostatic /69 09/24 0845    Standing Orthostatic Pulse (bpm) 92 09/24 0845            Appearance: awake, alert and adequately groomed  Attitude:  cooperative  Eye Contact:  good  Mood:  good  Affect:  mood congruent  Speech:  clear, coherent  Psychomotor " Behavior:  no evidence of tardive dyskinesia, dystonia, or tics  Thought Process:  goal oriented  Associations:  no loose associations  Thought Content:  no evidence of suicidal ideation or homicidal ideation and no evidence of psychotic thought  Insight:  fair  Judgement:  intact  Oriented to:  time, person, and place  Attention Span and Concentration:  intact  Recent and Remote Memory:  intact    Clinical Global Impressions  First:  Considering your total clinical experience with this particular patient population, how severe are the patient's symptoms at this time?: 7 (09/23/21 0455)  Compared to the patient's condition at the START of treatment, this patient's condition is: 4 (09/23/21 0455)  Most recent:  Considering your total clinical experience with this particular patient population, how severe are the patient's symptoms at this time?: 7 (09/23/21 0455)  Compared to the patient's condition at the START of treatment, this patient's condition is: 4 (09/23/21 0455)         Precautions:     Behavioral Orders   Procedures     Code 1 - Restrict to Unit     Fall precautions     Routine Programming     As clinically indicated     Status 15     Every 15 minutes.     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   2) Continue Trazodone 100mg at bedtime.   3) Discharge to family's home on Saturday.       Disposition Plan   Reason for ongoing admission: poses an imminent risk to self  Discharge location: TBD  Discharge Medications: not ordered  Follow-up Appointments: not scheduled  Legal Status: voluntary    Entered by: Avery Mcdermott on September 30, 2021 at 10:25 AM

## 2021-09-30 NOTE — PROGRESS NOTES
Group length: 1 hour       Number of participants: 5      Summary of Group / Topics Discussed:    The Psychotherapy group goal is to promote insight to positive choice and change. Group processing is within a supportive and safe environment. Patients will process emotions using verbal group and expressive psychotherapy interventions.       Patient's Response: Patient was alert cooperative and oriented x4, patient was attentive, engaged, made eye contact,asked questions, was future oriented, pt. Verbalized hope and willingness to get better. Patient was respectful towards others and participated appropriately.

## 2021-09-30 NOTE — PLAN OF CARE
"  Problem: Depression  Goal: Improved Mood  Outcome: Improving  Intervention: Monitor and Manage Depressive Symptoms  Recent Flowsheet Documentation  Taken 9/29/2021 2031 by Christiana Hendricks RN  Supportive Measures:    active listening utilized    positive reinforcement provided    relaxation techniques promoted    verbalization of feelings encouraged     Problem: Depression  Goal: Improved Mood  Intervention: Monitor and Manage Depressive Symptoms  Recent Flowsheet Documentation  Taken 9/29/2021 2031 by Christiana Hendricks RN  Supportive Measures:    active listening utilized    positive reinforcement provided    relaxation techniques promoted    verbalization of feelings encouraged     Problem: Mood Impairment (Anxiety Signs/Symptoms)  Goal: Improved Mood Symptoms (Anxiety Signs/Symptoms)  Intervention: Optimize Emotion and Mood  Recent Flowsheet Documentation  Taken 9/29/2021 2031 by Christiana Hendricks RN  Supportive Measures:    active listening utilized    positive reinforcement provided    relaxation techniques promoted    verbalization of feelings encouraged    Patient is visible throughout the shift. She attended and actively participated in groups. Social with both staff and peers. Patient denied SI/SIB nor hallucinations. She feels \"encouraged\" today, because she was able to talk with the representative  of the [employer] who will help her find a place to stay/live. Her goal for today is to talk to her daughter which she did later this afternoon and it went well as claimed.  Patient denied SI/SIB nor hallucinations. Her mood is good with a bright and full-range affect. She feels safe on the unit and is feeling hopeful that she will have a place to live after discharge. Patient remains anxious and rated her anxiety as 7 out of 10 (10 is the worst). She did not request for anti-anxiety meds. Her appetite is \"great\". She consumed 100% of her dinner. At night, she said that she sleeps on and " off. She kept waking up. Encouraged to notify the staff or RN if not able to sleep so measures can be done to help her sleep. She had a BM today. She complained of lower back pain but relieved by walking and repositioning. She is med compliant. Patient is forgetful. She asked this writer whether she already had gotten her meds which this writer gave about 5 minutes ago. She smiled and went to bed early.

## 2021-09-30 NOTE — PLAN OF CARE
Problem: Sleep Disturbance  Goal: Adequate Sleep/Rest  Outcome: No Change     Pt was awake at the start of the shift and requested for her prn trazodone due to inability to fall asleep.  2352 Trazodone 100 mg given  Pt calm and denied pain.  Fell asleep after 30 minutes  Slept for a total of 7.25 hours  Woke up with generalized pain  0701 Tylenol 650 mg given po

## 2021-09-30 NOTE — PLAN OF CARE
Pt actively participated in a structured Therapeutic Recreation group with a focus on leisure participation and exercise. Pt participated in the guided exercise for the full duration of the group. Pt followed along, engaged in the guided chair exercise routine.  Pt was encouraged to use positive imagery with the deep breathing and stretching to foster relaxation, improves focus, and reduce stress. Pt was very sociable throughout the group. Pt expressed she thought it was sad that so few people came to group, compared to a few days ago.

## 2021-09-30 NOTE — PLAN OF CARE
Assessment/Intervention/Current Symptoms and Care Coordination  The patient's care was discussed with the treatment team and chart notes were reviewed.      Discharge Plan or Goal  Discharge to home with family. Patient will be referred to a new psychiatrist and therapist in [place] for aftercares.      Barriers to Discharge   Needs further psychiatric evaluation and stabilization.      Referral Status  Psychiatry: [facility]  Provider: VALERIE Zafar  In person Appointment: November 1, at 1-2 pm  Address: [address] [place] [address], [facility] [address] [facility],   Phone: [phone]     Legal Status  Vol

## 2021-09-30 NOTE — PLAN OF CARE
Problem: Depression  Goal: Improved Mood  9/30/2021 1106 by Julia Nichols, RN  Outcome: improving      Pt has bright affect, calm mood. Pt has goal to shower, change her linen, and walk with the walker. Pt has been walking with walker to increase her strength in prep for discharge.  Pt does not attend groups, out in lounge for meals. Pt is medication compliant.     Some anxiety around discharge plan to daughter temporarily. She feels good about working [employer].     Plan to discharge Saturday

## 2021-09-30 NOTE — PROGRESS NOTES
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 She reports she it was later revised d/t severe vitamin/mineral deficiencies and that they don't do this particular procedure anymore. She reports she does sometimes struggle with vomiting, but knows which foods she needs to avoid/limit. She doesn't eat much meat, but reports she consumes a lot of yogurt, eggs, soft meats, cheese and she tolerates these foods well. She reports she was very ill in July and didn't eat for 15 days, which resulted in severe weight loss and a low weight of 111 lbs. She wasn't specific on the details surrounding this illness and unable to locate these records.    Weight: trending up  09/30/21 60.1 kg (132 lb 9.6 oz)   09/28/21 59 kg (130 lb)   09/26/21 59.3 kg (130 lb 12.8 oz)   09/23/21 58.4 kg (128 lb 12.8 oz)   09/22/21 58.7 kg (129 lb 8 oz)- admission weight   09/22/21 59 kg (130 lb)   10/03/16 84.5 kg (186 lb 4.8 oz)   10/02/16 84.9 kg (187 lb 3.2 oz)   09/30/16 85.3 kg (188 lb)   06/27/16 86.2 kg (190 lb)   05/27/16 86.6 kg (191 lb)   04/08/16 83.5 kg (184 lb)   03/01/16 82.4 kg (181 lb 9.6 oz)   02/24/16 83.9 kg (185 lb)       MALNUTRITION  % Intake: No decreased intake noted  % Weight Loss: Weight loss does not meet criteria  Subcutaneous Fat Loss: Facial region:  Orbital- moderate, tricep- moderate  Muscle Loss: None  observed beyond age-related changes  Fluid Accumulation/Edema: None noted  Malnutrition Diagnosis: Patient does not meet two of the established criteria necessary for diagnosing malnutrition    Previous Goals   Patient to consume % of nutritionally adequate meal trays TID, or the equivalent with supplements/snacks.  Evaluation: Met    Previous Nutrition Diagnosis  Inadequate oral intake related to mental health symptom exacerbation as evidenced by pt report of weight loss and decreased PO PTA    Evaluation: Resolved    CURRENT NUTRITION DIAGNOSIS  No nutrition diagnosis at this time    INTERVENTIONS  Implementation  Education: Discussed nutrition history and PO since admission.  Discussed oral nutrition supplements available if needed. Encouraged adequate PO of food and fluids.    Continue current diet orders    Goals  Patient to consume % of nutritionally adequate meal trays TID, or the equivalent with supplements/snacks.    Monitoring/Evaluation  RD will sign off at this time, but remains available if reconsulted    Milly Valencia RD, LD  ICU/5A/OB/Mental Health Pager (M-F): [phone]  On Call Pager (weekends only): [phone]

## 2021-10-01 PROCEDURE — 250N000013 HC RX MED GY IP 250 OP 250 PS 637: Performed by: PSYCHIATRY & NEUROLOGY

## 2021-10-01 PROCEDURE — 99232 SBSQ HOSP IP/OBS MODERATE 35: CPT | Performed by: PSYCHIATRY & NEUROLOGY

## 2021-10-01 PROCEDURE — 90853 GROUP PSYCHOTHERAPY: CPT

## 2021-10-01 PROCEDURE — 250N000013 HC RX MED GY IP 250 OP 250 PS 637: Performed by: PHYSICIAN ASSISTANT

## 2021-10-01 PROCEDURE — 124N000003 HC R&B MH SENIOR/ADOLESCENT

## 2021-10-01 PROCEDURE — H2032 ACTIVITY THERAPY, PER 15 MIN: HCPCS

## 2021-10-01 RX ORDER — TRAZODONE HYDROCHLORIDE 100 MG/1
100 TABLET ORAL
Qty: 30 TABLET | Refills: 1 | Status: SHIPPED | OUTPATIENT
Start: 2021-10-01

## 2021-10-01 RX ORDER — DULOXETIN HYDROCHLORIDE 60 MG/1
60 CAPSULE, DELAYED RELEASE ORAL 2 TIMES DAILY
Qty: 60 CAPSULE | Refills: 1 | Status: SHIPPED | OUTPATIENT
Start: 2021-10-01 | End: 2021-10-02

## 2021-10-01 RX ADMIN — LEVOTHYROXINE SODIUM 75 MCG: 75 TABLET ORAL at 06:53

## 2021-10-01 RX ADMIN — PANTOPRAZOLE SODIUM 40 MG: 40 TABLET, DELAYED RELEASE ORAL at 06:53

## 2021-10-01 RX ADMIN — TRAZODONE HYDROCHLORIDE 100 MG: 100 TABLET ORAL at 21:35

## 2021-10-01 RX ADMIN — ATORVASTATIN CALCIUM 20 MG: 20 TABLET, FILM COATED ORAL at 21:35

## 2021-10-01 RX ADMIN — ASPIRIN 81 MG: 81 TABLET, COATED ORAL at 08:33

## 2021-10-01 RX ADMIN — DULOXETINE HYDROCHLORIDE 60 MG: 60 CAPSULE, DELAYED RELEASE ORAL at 21:35

## 2021-10-01 RX ADMIN — DULOXETINE HYDROCHLORIDE 60 MG: 60 CAPSULE, DELAYED RELEASE ORAL at 08:33

## 2021-10-01 RX ADMIN — MICONAZOLE NITRATE: 20 POWDER TOPICAL at 21:35

## 2021-10-01 RX ADMIN — CALCIUM CARBONATE 600 MG (1,500 MG)-VITAMIN D3 400 UNIT TABLET 1 TABLET: at 08:34

## 2021-10-01 ASSESSMENT — ACTIVITIES OF DAILY LIVING (ADL)
DRESS: SCRUBS (BEHAVIORAL HEALTH)
ORAL_HYGIENE: INDEPENDENT
LAUNDRY: UNABLE TO COMPLETE
ORAL_HYGIENE: INDEPENDENT
DRESS: INDEPENDENT;STREET CLOTHES;SCRUBS (BEHAVIORAL HEALTH)
HYGIENE/GROOMING: INDEPENDENT
HYGIENE/GROOMING: INDEPENDENT

## 2021-10-01 NOTE — PLAN OF CARE
Pt presents with full range affect and calm mood. Denies SI/SIB and hallucinations. Denies depression, reports some anxiety surrounding her discharge Saturday. Pt was present in the lounge most of the shift and social with staff and peers. Reports that she is hopeful regarding her future - working with [employer] so that she may find independent housing. Reports that she is going back to live with her daughter temporarily until placement can be found. Pt did express some issues with her daughter in law and how it is another relationship lost, but she reports that she is handling it okay. Offered pt reassurance which she was receptive of. Denies pain. VSS. Medication compliant, PRN Trazodone given at HS for sleep. Appetite and fluid intake adequate. Pt reports that she was concerned about her BP being low this AM, sitting/standing checked again this afternoon with no significant drops. Encouraged pt to continue to push fluids. Pt did shower this evening and new linens were placed on her bed. No other concerns or complaints noted.

## 2021-10-01 NOTE — PLAN OF CARE
Assessment/Intervention/Current Symptoms and Care Coordination  The patient's care was discussed with the treatment team and chart notes were reviewed. [facility] met with patient. CTC went over outpatient services. Notified her that she will need to call [facility] and tell them that she has a psychiatry appointment on November 1st but will need a refill of her medications at least 5 days before her appointment to make sure she does not run out of her meds. Patient was also notified [facility] sent a referral to [facility] for individual therapy. CTC was told Patient will need to call the number on her AVS and schedule an appointment with a therapist. Patient verbalized understanding and told writer she will call [facility] and [facility]. CTC also went over gambling addictions resources with her and notified her that they are also listed on her AVS.     Patient notified writer that she feels better, contacts for safety and is looking forward to discharging tomorrow.     Discharge Plan or Goal  Discharge to home with family. Patient will be discharging Saturday 10/1 and will be picked up by her daughter.     Barriers to Discharge   None identified at this .      Referral Status  Psychiatry: [facility]  Provider: VALERIE Zafar  In person Appointment: November 1, at 1-2 pm  Address: [address] [place] [address], [address] [facility],   Phone: [phone]     Individual Therapy: [facility]- [place], [place]  Address: [address] [place] [address] [address], [place] [address]  An online referral was sent to [facility] on your behalf. Please call Phone: [phone] to schedule an individual therapy appointment.     Legal Status  Vol

## 2021-10-01 NOTE — DISCHARGE INSTRUCTIONS
Behavioral Discharge Planning and Instructions    Summary: You were admitted on 9/22/2021  due to Depression, Anxiety, Suicidal Ideations and Suicide Attempt.  You were treated by Avery Mcdermott MD and discharged on 10/2/2021 from [facility] to Home    Main Diagnosis:   Depression  Anxiety  SI    Health Care Follow-up:     Psychiatry: [facility]  Provider: VALERIE Zafar  In person Appointment: November 1, 2021 at 1-2 pm  Address: [address] [place] [address], [address] [facility],   Phone: [phone]    Individual Therapy: [facility]- [place], [place]  Address: [address], [place] [address]  An online referral was sent to [facility] on your behalf. Please call Phone: [phone] to schedule an individual therapy appointment.     Attend all scheduled appointments with your outpatient providers. Call at least 24 hours in advance if you need to reschedule an appointment to ensure continued access to your outpatient providers.     Major Treatments, Procedures and Findings:  You were provided with: a psychiatric assessment, assessed for medical stability, medication evaluation and/or management, group therapy, individual therapy and milieu management    Symptoms to Report: feeling more aggressive, increased confusion, losing more sleep, mood getting worse or thoughts of suicide    Early warning signs can include: increased depression or anxiety sleep disturbances increased thoughts or behaviors of suicide or self-harm  increased unusual thinking, such as paranoia or hearing voices    Safety and Wellness:  Take all medicines as directed.  Make no changes unless your doctor suggests them.      Follow treatment recommendations.  Refrain from alcohol and non-prescribed drugs.  Ask your support system to help you reduce your access to items that could harm yourself or others. Items could include:  Firearms  Medicines (both prescribed and over-the-counter)  Knives and other sharp  "objects  Ropes and like materials  Car keys  If there is a concern for safety, call 911. If there is a concern for safety, call 911.    Resources:   Crisis Intervention: 547.865.6888 or 973-983-7793 (TTY: 648.340.9799).  Call anytime for help.  [place] [employer]ness (www.[place].ashley.org): 163.980.8601 or 339-485-2901.  Mental Health Association of [place] (www.mentalhealth.org): 240.460.2498 or 029-475-7226  [facility] Crisis Response 347-816-3708  Text 4 Life: txt \"LIFE\" to 56227 for immediate support and crisis intervention  Crisis text line: Text \"[place]\" to 671754. Free, confidential, 24/7.  Crisis Intervention: 910.902.4991 or 801-126-7944. Call anytime for help.     Resources for Gambling issues.     [facility] PROBLEM GAMBLING HELPLINE  Call: [phone]  Text: 2-302-590-4727  Chat: doxo.org/chat    and    [facility] PROBLEM GAMBLING ALLIANCE  [address] [address], [address]  [place] [address]  Tel: [phone]  Fax: [phone]  Email: [email or web address]  Website: [email or web address]  Helpline: 509.041.HOPE (1448)  Text  HOPE  to 80320    General Medication Instructions:   See your medication sheet(s) for instructions.   Take all medicines as directed.  Make no changes unless your doctor suggests them.   Go to all your doctor visits.  Be sure to have all your required lab tests. This way, your medicines can be refilled on time.  Do not use any drugs not prescribed by your doctor.  Avoid alcohol.    Advance Directives:   Scanned document on file with [facility]? No scanned doc  Is document scanned? Pt unable to confirm  Honoring Choices Your Rights Handout: Informed and given  Was more information offered? Materials given    The Treatment team has appreciated the opportunity to work with you. If you have any questions or concerns about your recent admission, you can contact the unit which can receive your call 24 hours a day, 7 days a week. They will be able to get in touch with a " Provider if needed. The unit number is [phone] .

## 2021-10-01 NOTE — PROGRESS NOTES
[facility], [place]   Psychiatric Progress Note        Interim History:   The patient's care was discussed with the treatment team during the daily team meeting and/or staff's chart notes were reviewed.  Staff report patient has been doing well.    The patient reports that she is looking forward to discharge and found out that her daughter-in-law is okay with her visiting, just not staying with her. Did have some appointments with housing authority and will need to take care of some paperwork issues before moving forward. Mood is good. Denies SI. Sleeping and eating well. Feels ready for discharge tomorrow.          Medications:       aspirin  81 mg Oral Daily     atorvastatin  20 mg Oral QPM     calcium carbonate 600 mg-vitamin D 400 units  1 tablet Oral Daily     DULoxetine  60 mg Oral BID     levothyroxine  75 mcg Oral Daily     miconazole   Topical BID     pantoprazole  40 mg Oral Daily          Allergies:     Allergies   Allergen Reactions     Monosodium Glutamate      Morphine Hcl Rash     Nicotine Polacrilex [Nicotine] Rash     Gets a rash from nicotine patch. Pt is a smoker.           Labs:     Recent Results (from the past 24 hour(s))   Asymptomatic COVID-19 Virus (Coronavirus) by PCR Nasopharyngeal    Collection Time: 09/30/21  9:57 PM    Specimen: Nasopharyngeal; Swab   Result Value Ref Range    SARS CoV2 PCR Negative Negative          Psychiatric Examination:     /73   Pulse 88   Temp 97  F (36.1  C) (Oral)   Resp 16   Ht 1.524 m (5')   Wt 60.1 kg (132 lb 9.6 oz)   SpO2 97%   BMI 25.90 kg/m    Weight is 132 lbs 9.6 oz  Body mass index is 25.9 kg/m .  Orthostatic Vitals       Most Recent      Standing Orthostatic /69 09/24 0845    Standing Orthostatic Pulse (bpm) 92 09/24 0845            Appearance: awake, alert and adequately groomed  Attitude:  cooperative  Eye Contact:  good  Mood:  good  Affect:  mood congruent  Speech:  clear, coherent  Psychomotor  Behavior:  no evidence of tardive dyskinesia, dystonia, or tics  Thought Process:  goal oriented  Associations:  no loose associations  Thought Content:  no evidence of suicidal ideation or homicidal ideation and no evidence of psychotic thought  Insight:  fair  Judgement:  intact  Oriented to:  time, person, and place  Attention Span and Concentration:  intact  Recent and Remote Memory:  intact    Clinical Global Impressions  First:  Considering your total clinical experience with this particular patient population, how severe are the patient's symptoms at this time?: 7 (09/23/21 0455)  Compared to the patient's condition at the START of treatment, this patient's condition is: 4 (09/23/21 0455)  Most recent:  Considering your total clinical experience with this particular patient population, how severe are the patient's symptoms at this time?: 7 (09/23/21 0455)  Compared to the patient's condition at the START of treatment, this patient's condition is: 4 (09/23/21 0455)         Precautions:     Behavioral Orders   Procedures     Code 1 - Restrict to Unit     Fall precautions     Routine Programming     As clinically indicated     Status 15     Every 15 minutes.     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   2) Continue Trazodone 100mg at bedtime.   3) Discharge to family's home on Saturday.       Disposition Plan   Reason for ongoing admission: poses an imminent risk to self  Discharge location: home with family  Discharge Medications: ordered.  Follow-up Appointments: scheduled  Legal Status: voluntary    Entered by: Avery Mcdermott on Oct 1, 2021 at 11:06 AM

## 2021-10-02 VITALS
SYSTOLIC BLOOD PRESSURE: 97 MMHG | TEMPERATURE: 97 F | OXYGEN SATURATION: 97 % | BODY MASS INDEX: 26.03 KG/M2 | HEART RATE: 81 BPM | HEIGHT: 60 IN | WEIGHT: 132.6 LBS | RESPIRATION RATE: 16 BRPM | DIASTOLIC BLOOD PRESSURE: 62 MMHG

## 2021-10-02 PROCEDURE — 250N000013 HC RX MED GY IP 250 OP 250 PS 637: Performed by: PSYCHIATRY & NEUROLOGY

## 2021-10-02 PROCEDURE — 99239 HOSP IP/OBS DSCHRG MGMT >30: CPT | Performed by: NURSE PRACTITIONER

## 2021-10-02 RX ORDER — LEVOTHYROXINE SODIUM 75 UG/1
75 TABLET ORAL DAILY
Qty: 30 TABLET | Refills: 1 | Status: SHIPPED | OUTPATIENT
Start: 2021-10-02

## 2021-10-02 RX ORDER — PANTOPRAZOLE SODIUM 40 MG/1
40 TABLET, DELAYED RELEASE ORAL DAILY
Qty: 30 TABLET | Refills: 1 | Status: SHIPPED | OUTPATIENT
Start: 2021-10-02

## 2021-10-02 RX ORDER — ATORVASTATIN CALCIUM 20 MG/1
20 TABLET, FILM COATED ORAL DAILY
Qty: 30 TABLET | Refills: 1 | Status: ON HOLD | OUTPATIENT
Start: 2021-10-02 | End: 2023-01-01

## 2021-10-02 RX ORDER — HYDROXYZINE PAMOATE 50 MG/1
50 CAPSULE ORAL EVERY 6 HOURS PRN
Qty: 60 CAPSULE | Refills: 1 | Status: SHIPPED | OUTPATIENT
Start: 2021-10-02

## 2021-10-02 RX ORDER — DULOXETIN HYDROCHLORIDE 60 MG/1
60 CAPSULE, DELAYED RELEASE ORAL 2 TIMES DAILY
Qty: 60 CAPSULE | Refills: 1 | Status: SHIPPED | OUTPATIENT
Start: 2021-10-02

## 2021-10-02 RX ADMIN — LEVOTHYROXINE SODIUM 75 MCG: 75 TABLET ORAL at 06:44

## 2021-10-02 RX ADMIN — ASPIRIN 81 MG: 81 TABLET, COATED ORAL at 08:10

## 2021-10-02 RX ADMIN — DULOXETINE HYDROCHLORIDE 60 MG: 60 CAPSULE, DELAYED RELEASE ORAL at 08:10

## 2021-10-02 RX ADMIN — PANTOPRAZOLE SODIUM 40 MG: 40 TABLET, DELAYED RELEASE ORAL at 06:44

## 2021-10-02 RX ADMIN — CALCIUM CARBONATE 600 MG (1,500 MG)-VITAMIN D3 400 UNIT TABLET 1 TABLET: at 08:10

## 2021-10-02 ASSESSMENT — ACTIVITIES OF DAILY LIVING (ADL)
ORAL_HYGIENE: INDEPENDENT
DRESS: INDEPENDENT;STREET CLOTHES
HYGIENE/GROOMING: INDEPENDENT

## 2021-10-02 NOTE — PROGRESS NOTES
" 10/01/21 2200   Groups   Details    (Psychotherapy)   Number of patients attending the group:  5  Group Length:  2 Hours     Group Therapy Type: Psychotherapy     Summary of Group / Topics Discussed:        The  Psychotherapy group goal is to promote insight to positive choice and change. Group processing is within a supportive and safe environment. Patients will process emotions using verbal group and expressive psychotherapy interventions including visual art/writing interventions.     Group interventions support patients by: creative self expression, communication/social skills and supports, learn positive coping mechanisms and mental health management     Modalities to reach these goals include: Verbal Processing, and Expressive Arts Therapies- expression of feelings, Mindfulness- Zentangle patterns     Subjective -patient report of mood today-\"I talk alot\"     Objective/ Intervention- Goal of group and Therapeutic modality utilized- Process group and watercolor painting     Group Response- engaged     Patient Response-Pt was engaged, pleasant and cooperative. She enjoyed making abstract bright patterns. She was hyper verbal and spoke throughout group, interrupting at times. She seemed very uncomfortable with silence. She also stated a few times that the group should say  \" Aracely shut up.\" She spoke about her family . She said she took the overdose to get attention because she didn't think her children or grandchildren care about her. She did talk about her family throughout group. She said she learned through this hospitalization that they do care and are \" mad at me\" for the attempt.\"       Patrice Cheek, LMFT, ATR-BC          "

## 2021-10-02 NOTE — PLAN OF CARE
"Music Therapy Group note    Clinical Hours in session: 1.0    Number of patients in group: 3    Scope of service: cognitive     Patient progress: improving     Intervention: Music of the Ages    Goal of group: Reminiscence     Patient response/reaction to treatment intervention(s): Sera dedicated the song \"Wind Beneath my Wings\" to her son who  in .  She described him to the group, his good qualities and challenges.  She stated \"the music makes me feel!\" She was a positive, contributing group member, responding to other's appropriately and looking forward to discharge tomorrow.     Brittany Zhao, [facility]  Board-Certified Music Therapist           "

## 2021-10-02 NOTE — PLAN OF CARE
Patient slept for 7.5 hrs.  Med compliant this AM.  Reported 1 episode of incontinence overnight.

## 2021-10-02 NOTE — DISCHARGE SUMMARY
"Psychiatric Discharge Summary    Sera Figueroa MRN# [id number]   Age: 76 year old YOB: 1945     Date of Admission:  9/22/2021  Date of Discharge:  10/2/2021  Admitting Physician:  Avery Mcdermott MD  Discharge Physician:  VALERIE Diamond CNP (Contact: [phone])         Event Leading to Hospitalization:      From H&P 9/23/2021:    \"I have had a lot of losses in the past year.\"    The patient is a 77yo female with a history of depression who was admitted after overdosing on 50 Benadryl tablets. Says that she was intending to end her life. Says that her sister passed away this summer and she wasn't suicidal then - \"I just wanted her back.\" Says that she is no longer having suicidal thoughts and \"just want to get better.\" Has been off her Cymbalta for two weeks and did find it beneficial. Says that she has been on it since 1995. Denies HI. Feels safe here in the hospital. Slept \"the best in forever\" last night with Trazodone. Eating well. Doesn't currently have providers in [place] nor a safe place to stay.      Per ER:  HISTORY OF PRESENT ILLNESS:  This 76-year-old female who drove herself to the Emergency Department parking lot.  She sat in her car contemplating suicide.  She then states she took approximately 50 over-the-counter Benadryl tablets.  She has the bottle with her.  These were 50 mg gel caps, as well as 25 mg tablets.  These products do not contain acetaminophen.  The patient denies any drug use or alcohol use tonight.  She states stressors include having to stay with her daughter and feeling like a burden.  She states this has been going on some time.  She states she has attempted suicide in the past and is requesting help.  She denies any active hallucinations or other complaints.    See full admission note by Dr. Mcdermott 9/23/2021 for details.          Diagnoses:     Major depressive disorder, severe, recurrent, without " psychosis  Hypothyroidism  Hyperlipidemia  History of CAD  GERD         Labs:      Ref. Range 9/22/2021 04:19 9/22/2021 04:26 9/22/2021 06:51 9/23/2021 08:02 9/30/2021 21:57   Sodium Latest Ref Range: 133 - 144 mmol/L 142       Potassium Latest Ref Range: 3.4 - 5.3 mmol/L 4.0       Chloride Latest Ref Range: 94 - 109 mmol/L 108       Carbon Dioxide Latest Ref Range: 20 - 32 mmol/L 25       Urea Nitrogen Latest Ref Range: 7 - 30 mg/dL 14       Creatinine Latest Ref Range: 0.52 - 1.04 mg/dL 0.45 (L)       GFR Estimate Latest Ref Range: >60 mL/min/1.73m2 >90       Calcium Latest Ref Range: 8.5 - 10.1 mg/dL 8.5       Anion Gap Latest Ref Range: 3 - 14 mmol/L 9       Albumin Latest Ref Range: 3.4 - 5.0 g/dL 3.4       Protein Total Latest Ref Range: 6.8 - 8.8 g/dL 6.7 (L)       Bilirubin Total Latest Ref Range: 0.2 - 1.3 mg/dL 0.3       Alkaline Phosphatase Latest Ref Range: 40 - 150 U/L 158 (H)       ALT Latest Ref Range: 0 - 50 U/L 16       AST Latest Ref Range: 0 - 45 U/L 10       Cholesterol Latest Ref Range: <200 mg/dL    207 (H)    Folate Latest Ref Range: >=5.4 ng/mL    15.3    HDL Cholesterol Latest Ref Range: >=50 mg/dL    41 (L)    LDL Cholesterol Calculated Latest Ref Range: <=100 mg/dL    138 (H)    Non HDL Cholesterol Latest Ref Range: <130 mg/dL    166 (H)    Triglycerides Latest Ref Range: <150 mg/dL    142    TSH Latest Ref Range: 0.40 - 4.00 mU/L    2.73    Vitamin B12 Latest Ref Range: 193 - 986 pg/mL    316    Vitamin D Deficiency screening Latest Ref Range: 20 - 75 ug/L    12 (L)    Glucose Latest Ref Range: 70 - 99 mg/dL 86       WBC Latest Ref Range: 4.0 - 11.0 10e3/uL 7.7       Hemoglobin Latest Ref Range: 11.7 - 15.7 g/dL 11.7       Hematocrit Latest Ref Range: 35.0 - 47.0 % 38.2       Platelet Count Latest Ref Range: 150 - 450 10e3/uL 272       RBC Count Latest Ref Range: 3.80 - 5.20 10e6/uL 3.88       MCV Latest Ref Range: 78 - 100 fL 99       MCH Latest Ref Range: 26.5 - 33.0 pg 30.2       MCHC  Latest Ref Range: 31.5 - 36.5 g/dL 30.6 (L)       RDW Latest Ref Range: 10.0 - 15.0 % 13.5       % Neutrophils Latest Units: % 67       % Lymphocytes Latest Units: % 20       % Monocytes Latest Units: % 9       % Eosinophils Latest Units: % 2       Absolute Basophils Latest Ref Range: 0.0 - 0.2 10e3/uL 0.1       % Basophils Latest Units: % 1       Absolute Eosinophils Latest Ref Range: 0.0 - 0.7 10e3/uL 0.1       Absolute Immature Granulocytes Latest Ref Range: <=0.0 10e3/uL 0.0       Absolute Lymphocytes Latest Ref Range: 0.8 - 5.3 10e3/uL 1.5       Absolute Monocytes Latest Ref Range: 0.0 - 1.3 10e3/uL 0.7       % Immature Granulocytes Latest Units: % 1       Absolute Neutrophils Latest Ref Range: 1.6 - 8.3 10e3/uL 5.2       Absolute NRBCs Latest Units: 10e3/uL 0.0       NRBCs per 100 WBC Latest Ref Range: <1 /100 0       SARS CoV2 PCR Latest Ref Range: Negative   Negative   Negative   Acetaminophen Level Latest Ref Range: 10 - 30 mg/L <2 (L)       Ethanol g/dL Latest Ref Range: <=0.01 g/dL <0.01       Salicylate Level Latest Ref Range: <20 mg/dL <2       Amphetamine Qual Urine Latest Ref Range: Screen Negative    Screen Negative     Cocaine Qual Urine Latest Ref Range: Screen Negative    Screen Negative     Benzodiazepine Qual Ur Latest Ref Range: Screen Negative    Screen Negative     Opiates Qualitative Urine Latest Ref Range: Screen Negative    Screen Negative     Cannabinoids Qual Urine Latest Ref Range: Screen Negative    Screen Negative     Barbiturates Qual Urine Latest Ref Range: Screen Negative    Screen Negative              Consults:     Consultation during this admission received from internal medicine 9/23/2021    IMPRESSION:    1.  Depression, status post overdose.  The patient is medically stable.  Treatment to be continued and followed by Dr. Mcdermott.  2.  Hypothyroidism, currently euthyroid based on normal thyroid function testing on admission.  3.  Hyperlipidemia with current lipid panel  revealing some increased dyslipidemia; however, question fasting blood draw or not.  4.  History of coronary artery disease, status post stenting without any current concerns at this time.  5.  Gastroesophageal reflux disease, stable on daily PPI therapy.     PLAN:  No medical intervention indicated at this time.  Continue with [id number]-foreaucre statin therapy and aspirin as well as levothyroxine and pantoprazole.  She should follow up with family physician in 3-6 months after discharge for a repeat fasting lipid panel.  The patient is medically stable and medicine signing off.            Hospital Course:     Sera Figueroa was admitted to [facility] with attending Avery Mcdermott MD as a voluntary patient.  The patient was placed under status 15 (15 minute checks) to ensure patient safety.     Cymbalta was restarted at 60 mg BID.  Trazodone 100 mg at HS was initiated.  PRN Hydroxyzine 50 mg was continued.  She tolerated her medications well, without complaints of side effects.     Sera Figueroa did participate in groups and was visible in the milieu. Behavior was calm and cooperative.  She appeared motivated for treatment and recovery.    The patient's symptoms of depression improved.  She was hopeful and future-oriented.  She denied suicidal and homicidal ideation.  She ate and slept well.  There was no evidence of psychosis.    Sera Figueroa was released to home.  At the time of discharge Sera Figueroa was determined to not be a danger to herself or others.          Discharge Medications:      Sera Figueroa   Home Medication Instructions PORSCHE:[id number]    Printed on:10/02/21 0750   Medication Information                      aspirin EC 81 MG EC tablet  Take 1 tablet (81 mg) by mouth daily             atorvastatin (LIPITOR) 20 MG tablet  Take 20 mg by mouth daily             calcium-vitamin D (CALTRATE) 600-400 MG-UNIT per tablet  Take 1 tablet by mouth daily             DULoxetine (CYMBALTA) 60  "MG capsule  Take 1 capsule (60 mg) by mouth 2 times daily             hydrOXYzine (VISTARIL) 50 MG capsule  Take 50 mg by mouth every 6 hours as needed             levothyroxine (SYNTHROID/LEVOTHROID) 75 MCG tablet  Take 75 mcg by mouth daily             miconazole (MICATIN) 2 % external powder  Apply topically 2 times daily             pantoprazole (PROTONIX) 40 MG EC tablet  Take 40 mg by mouth daily             traZODone (DESYREL) 100 MG tablet  Take 1 tablet (100 mg) by mouth nightly as needed for sleep                      Psychiatric Examination:     Appearance: awake, alert and adequately groomed  Attitude:  cooperative  Eye Contact:  good  Mood:  \"better,\" less depressed, less anxious  Affect:  mood congruent  Speech:  clear, coherent  Language: fluent and intact in English  Psychomotor, Gait, Musculoskeletal:  no evidence of tardive dyskinesia, dystonia, or tics  Thought Process:  goal oriented, linear  Associations:  no loose associations  Thought Content:  no evidence of suicidal ideation or homicidal ideation and no evidence of psychotic thought  Insight:  fair  Judgement:  intact  Oriented to:  date, time, person, and place  Attention Span and Concentration:  intact  Recent and Remote Memory:  intact  Fund of Knowledge:  appropriate     BP 97/62 (BP Location: Right arm)   Pulse 81   Temp 97  F (36.1  C) (Temporal)   Resp 16   Ht 1.524 m (5')   Wt 60.1 kg (132 lb 9.6 oz)   SpO2 97%   BMI 25.90 kg/m           Discharge Plan:     Per Discharge AVS:    Health Care Follow-up:     Psychiatry: [facility]  Provider: VALERIE Zafar  In person Appointment: November 1, 2021 at 1-2 pm  Address: [address] [place] [address], [address] [facility],   Phone: [phone]    Individual Therapy: [facility]- [place], [place]  Address: [address] [place] [address] [address], [place] [address]  An online referral was sent to [facility] on your behalf. Please call Phone: [phone] to schedule an " individual therapy appointment.      She was provided with a 30-day supply of medications plus one refill.  She was advised to take her medications as prescribed and to abstain from alcohol and illicit substances.        Attestation:  The patient has been seen and evaluated by me, VALERIE Diamond CNP   Discharge time > 30 minutes

## 2021-10-02 NOTE — PLAN OF CARE
Pt reports that she feels ready for discharge. Pt rated anxiety at 2  and depression at 3-4/10 (where 10 is the worst). Pt denies SI/SIB.   AVS was reviewed with patient including medication times and follow up appointments.   All belongings were sent home with patient including AVS and new prescriptions.  Pt was picked up by her daughter at approximately 1155.

## 2021-10-02 NOTE — PLAN OF CARE
Pt presents with full range affect and calm mood. Denies SI/SIB and hallucinations. Reports some anxiety regarding discharge tomorrow but she is excited and hopeful. Pt was present in the lounge most of the shift and social with staff and peers. Attended music therapy this evening. Pt was incontinent of urine this evening - reports that this usually does not happen but group ran long and she drank too much coffee. Advised pt that she can leave group if she needs to go to the bathroom. Denies pain. VSS. Appetite and fluid intake adequate. Medication compliant - PRN Trazodone given at HS for sleep. Pt reports daughter will be here at noon to pick her up for discharge. Contacted [facility] to verify her car was there and not towed - [place] in security stated they do not tow vehicles so it should still be where she left it - pt was informed of this. No other concerns or complaints noted.

## 2021-10-05 ENCOUNTER — TELEPHONE (OUTPATIENT)
Dept: PHARMACY | Facility: OTHER | Age: 76
End: 2021-10-05

## 2021-10-05 NOTE — TELEPHONE ENCOUNTER
MTM referral from: Transitions of Care (recent hospital discharge or ED visit)    MTM referral outreach attempt #2 on October 5, 2021 at 2:47 PM      Outcome: Patient not reachable after several attempts, will route to MTM Pharmacist/Provider as an FYI.  [facility] scheduling number is [phone].  Thank you for the referral.    Christos Irby, MT coordinator

## 2021-10-23 ENCOUNTER — HEALTH MAINTENANCE LETTER (OUTPATIENT)
Age: 76
End: 2021-10-23

## 2021-11-22 ENCOUNTER — HOSPITAL ENCOUNTER (EMERGENCY)
Facility: CLINIC | Age: 76
Discharge: HOME | End: 2021-11-22
Attending: EMERGENCY MEDICINE | Admitting: EMERGENCY MEDICINE
Payer: MEDICARE

## 2021-11-22 ENCOUNTER — APPOINTMENT (OUTPATIENT)
Dept: CT IMAGING | Facility: CLINIC | Age: 76
End: 2021-11-22
Attending: EMERGENCY MEDICINE
Payer: MEDICARE

## 2021-11-22 VITALS
OXYGEN SATURATION: 99 % | HEART RATE: 87 BPM | RESPIRATION RATE: 18 BRPM | SYSTOLIC BLOOD PRESSURE: 111 MMHG | DIASTOLIC BLOOD PRESSURE: 63 MMHG | TEMPERATURE: 98.1 F

## 2021-11-22 DIAGNOSIS — F22 DELUSION OF INFESTATION (H): ICD-10-CM

## 2021-11-22 DIAGNOSIS — N39.0 URINARY TRACT INFECTION WITHOUT HEMATURIA, SITE UNSPECIFIED: ICD-10-CM

## 2021-11-22 LAB
ALBUMIN SERPL-MCNC: 3.3 G/DL (ref 3.4–5)
ALBUMIN UR-MCNC: 10 MG/DL
ALP SERPL-CCNC: 127 U/L (ref 40–150)
ALT SERPL W P-5'-P-CCNC: 21 U/L (ref 0–50)
AMMONIA PLAS-SCNC: 27 UMOL/L (ref 10–50)
AMPHETAMINES UR QL SCN: NORMAL
ANION GAP SERPL CALCULATED.3IONS-SCNC: 6 MMOL/L (ref 3–14)
APPEARANCE UR: CLEAR
AST SERPL W P-5'-P-CCNC: 17 U/L (ref 0–45)
BARBITURATES UR QL: NORMAL
BASOPHILS # BLD AUTO: 0.1 10E3/UL (ref 0–0.2)
BASOPHILS NFR BLD AUTO: 1 %
BENZODIAZ UR QL: NORMAL
BILIRUB SERPL-MCNC: 0.4 MG/DL (ref 0.2–1.3)
BILIRUB UR QL STRIP: NEGATIVE
BUN SERPL-MCNC: 23 MG/DL (ref 7–30)
CALCIUM SERPL-MCNC: 8.7 MG/DL (ref 8.5–10.1)
CANNABINOIDS UR QL SCN: NORMAL
CHLORIDE BLD-SCNC: 112 MMOL/L (ref 94–109)
CO2 SERPL-SCNC: 24 MMOL/L (ref 20–32)
COCAINE UR QL: NORMAL
COLOR UR AUTO: YELLOW
CREAT SERPL-MCNC: 0.49 MG/DL (ref 0.52–1.04)
EOSINOPHIL # BLD AUTO: 0.1 10E3/UL (ref 0–0.7)
EOSINOPHIL NFR BLD AUTO: 2 %
ERYTHROCYTE [DISTWIDTH] IN BLOOD BY AUTOMATED COUNT: 14 % (ref 10–15)
ETHANOL SERPL-MCNC: <0.01 G/DL
FLUAV RNA SPEC QL NAA+PROBE: NEGATIVE
FLUBV RNA RESP QL NAA+PROBE: NEGATIVE
GFR SERPL CREATININE-BSD FRML MDRD: >90 ML/MIN/1.73M2
GLUCOSE BLD-MCNC: 101 MG/DL (ref 70–99)
GLUCOSE UR STRIP-MCNC: NEGATIVE MG/DL
HCT VFR BLD AUTO: 37.9 % (ref 35–47)
HGB BLD-MCNC: 11.8 G/DL (ref 11.7–15.7)
HGB UR QL STRIP: NEGATIVE
IMM GRANULOCYTES # BLD: 0 10E3/UL
IMM GRANULOCYTES NFR BLD: 1 %
KETONES UR STRIP-MCNC: NEGATIVE MG/DL
LACTATE SERPL-SCNC: 0.9 MMOL/L (ref 0.7–2)
LEUKOCYTE ESTERASE UR QL STRIP: ABNORMAL
LIPASE SERPL-CCNC: 82 U/L (ref 73–393)
LYMPHOCYTES # BLD AUTO: 1.5 10E3/UL (ref 0.8–5.3)
LYMPHOCYTES NFR BLD AUTO: 24 %
MCH RBC QN AUTO: 28.4 PG (ref 26.5–33)
MCHC RBC AUTO-ENTMCNC: 31.1 G/DL (ref 31.5–36.5)
MCV RBC AUTO: 91 FL (ref 78–100)
MONOCYTES # BLD AUTO: 0.6 10E3/UL (ref 0–1.3)
MONOCYTES NFR BLD AUTO: 10 %
MUCOUS THREADS #/AREA URNS LPF: PRESENT /LPF
NEUTROPHILS # BLD AUTO: 3.9 10E3/UL (ref 1.6–8.3)
NEUTROPHILS NFR BLD AUTO: 62 %
NITRATE UR QL: NEGATIVE
NRBC # BLD AUTO: 0 10E3/UL
NRBC BLD AUTO-RTO: 0 /100
OPIATES UR QL SCN: NORMAL
PH UR STRIP: 6 [PH] (ref 5–7)
PLATELET # BLD AUTO: 250 10E3/UL (ref 150–450)
POTASSIUM BLD-SCNC: 4.2 MMOL/L (ref 3.4–5.3)
PROT SERPL-MCNC: 6.6 G/DL (ref 6.8–8.8)
RBC # BLD AUTO: 4.15 10E6/UL (ref 3.8–5.2)
RBC URINE: 10 /HPF
SARS-COV-2 RNA RESP QL NAA+PROBE: NEGATIVE
SODIUM SERPL-SCNC: 142 MMOL/L (ref 133–144)
SP GR UR STRIP: 1.03 (ref 1–1.03)
SQUAMOUS EPITHELIAL: 2 /HPF
UROBILINOGEN UR STRIP-MCNC: 4 MG/DL
WBC # BLD AUTO: 6.1 10E3/UL (ref 4–11)
WBC URINE: 70 /HPF

## 2021-11-22 PROCEDURE — 87086 URINE CULTURE/COLONY COUNT: CPT | Performed by: EMERGENCY MEDICINE

## 2021-11-22 PROCEDURE — 80307 DRUG TEST PRSMV CHEM ANLYZR: CPT | Performed by: EMERGENCY MEDICINE

## 2021-11-22 PROCEDURE — 87636 SARSCOV2 & INF A&B AMP PRB: CPT | Performed by: EMERGENCY MEDICINE

## 2021-11-22 PROCEDURE — 85025 COMPLETE CBC W/AUTO DIFF WBC: CPT | Performed by: EMERGENCY MEDICINE

## 2021-11-22 PROCEDURE — 70450 CT HEAD/BRAIN W/O DYE: CPT | Mod: ME

## 2021-11-22 PROCEDURE — 250N000013 HC RX MED GY IP 250 OP 250 PS 637: Performed by: EMERGENCY MEDICINE

## 2021-11-22 PROCEDURE — 83605 ASSAY OF LACTIC ACID: CPT | Performed by: EMERGENCY MEDICINE

## 2021-11-22 PROCEDURE — 82140 ASSAY OF AMMONIA: CPT | Performed by: EMERGENCY MEDICINE

## 2021-11-22 PROCEDURE — 81001 URINALYSIS AUTO W/SCOPE: CPT | Performed by: EMERGENCY MEDICINE

## 2021-11-22 PROCEDURE — 36415 COLL VENOUS BLD VENIPUNCTURE: CPT | Performed by: EMERGENCY MEDICINE

## 2021-11-22 PROCEDURE — 80053 COMPREHEN METABOLIC PANEL: CPT | Performed by: EMERGENCY MEDICINE

## 2021-11-22 PROCEDURE — 82077 ASSAY SPEC XCP UR&BREATH IA: CPT | Performed by: EMERGENCY MEDICINE

## 2021-11-22 PROCEDURE — 99285 EMERGENCY DEPT VISIT HI MDM: CPT | Mod: 25

## 2021-11-22 PROCEDURE — 96365 THER/PROPH/DIAG IV INF INIT: CPT

## 2021-11-22 PROCEDURE — C9803 HOPD COVID-19 SPEC COLLECT: HCPCS

## 2021-11-22 PROCEDURE — 83690 ASSAY OF LIPASE: CPT | Performed by: EMERGENCY MEDICINE

## 2021-11-22 PROCEDURE — 250N000011 HC RX IP 250 OP 636: Performed by: EMERGENCY MEDICINE

## 2021-11-22 RX ORDER — ONDANSETRON 4 MG/1
4 TABLET, ORALLY DISINTEGRATING ORAL ONCE
Status: COMPLETED | OUTPATIENT
Start: 2021-11-22 | End: 2021-11-22

## 2021-11-22 RX ORDER — CEPHALEXIN 500 MG/1
500 CAPSULE ORAL 4 TIMES DAILY
Qty: 28 CAPSULE | Refills: 0 | Status: SHIPPED | OUTPATIENT
Start: 2021-11-22 | End: 2023-01-01

## 2021-11-22 RX ORDER — OLANZAPINE 5 MG/1
10 TABLET, ORALLY DISINTEGRATING ORAL ONCE
Status: COMPLETED | OUTPATIENT
Start: 2021-11-22 | End: 2021-11-22

## 2021-11-22 RX ORDER — CEPHALEXIN 500 MG/1
500 CAPSULE ORAL 4 TIMES DAILY
Qty: 28 CAPSULE | Refills: 0 | Status: SHIPPED | OUTPATIENT
Start: 2021-11-22 | End: 2021-11-22

## 2021-11-22 RX ORDER — CEFTRIAXONE 2 G/1
2 INJECTION, POWDER, FOR SOLUTION INTRAMUSCULAR; INTRAVENOUS ONCE
Status: COMPLETED | OUTPATIENT
Start: 2021-11-22 | End: 2021-11-22

## 2021-11-22 RX ADMIN — CEFTRIAXONE 2 G: 2 INJECTION, POWDER, FOR SOLUTION INTRAMUSCULAR; INTRAVENOUS at 20:42

## 2021-11-22 RX ADMIN — ONDANSETRON 4 MG: 4 TABLET, ORALLY DISINTEGRATING ORAL at 17:21

## 2021-11-22 RX ADMIN — OLANZAPINE 10 MG: 5 TABLET, ORALLY DISINTEGRATING ORAL at 17:21

## 2021-11-22 ASSESSMENT — ENCOUNTER SYMPTOMS
FEVER: 0
CHILLS: 0
SHORTNESS OF BREATH: 0
NERVOUS/ANXIOUS: 1
NAUSEA: 1
HALLUCINATIONS: 1
MYALGIAS: 1
SORE THROAT: 1
HEADACHES: 0
COUGH: 1
SLEEP DISTURBANCE: 0
NECK PAIN: 0

## 2021-11-22 NOTE — ED PROVIDER NOTES
History   Chief Complaint:  Altered Mental Status       The history is provided by the patient.      Sera Figueroa is a 76 year old female with history of CAD, CKD, WILLIAM, depression, hyperlipidemia, GERD, STEMI, and hypertension who presents via ambulance with an altered mental status. The patient was seen in the ED on 09/22 for depression, anxiety, and a benadryl overdose. She was admitted to the hospital until 10/02 and then discharged home. Since then she reports taking 1 benadryl each night and at time also takes over the counter sleeping pills when struggling to sleep. Yesterday the patient began to have an itchy head and this morning she felt bugs were in her ears, arms, and legs. She reports they were biting her and leaving black and blue marks on her skin. She additionally reports her eyes were turning purple. Upon arrival to the ED today the patient explains along with her other symptoms she has also been experiencing a dry cough and sore throat the past 2 days. She additionally states she is nauseous and has some pain in her left arm. The patient does report she has been feeling more anxious and depressed recently due to a recent move here from [place] and her sister passing away. She did have an appointment with a psychiatrist, but with the move she missed it. The patient denies any fever, chills, shortness of breath, chest pain, headaches, neck pain, or lack of sleep.     Review of Systems   Constitutional: Negative for chills and fever.   HENT: Positive for sore throat.    Respiratory: Positive for cough. Negative for shortness of breath.    Cardiovascular: Negative for chest pain.   Gastrointestinal: Positive for nausea.   Musculoskeletal: Positive for myalgias (left arm). Negative for neck pain.   Neurological: Negative for headaches.   Psychiatric/Behavioral: Positive for hallucinations. Negative for sleep disturbance. The patient is nervous/anxious.    All other systems reviewed and are  negative.    Allergies:  Monosodium Glutamate  Morphine Hcl  Nicotine Polacrilex [Nicotine]    Medications:  aspirin 81 mg  atorvastatin  calcium-vitamin D   duloxetine   hydroxyzine   levothyroxine   pantoprazole   Trazodone  quetiapine     Past Medical History:     Abdominal wall hernia  Adenomatous polyp of colon  CAD (coronary artery disease)  Chronic kidney disease  Degenerative disk disease  WILLIAM (generalized anxiety disorder)  Hyperlipidemia  Major depression  Nephrolithiasis  Osteoporosis  GERD (gastroesophageal reflux disease)  STEMI (ST elevation myocardial infarction)   Chronic pain syndrome  Acquired hypothyroidism  Overdose  Suicide attempt    Vitamin D deficiency  Anemia   Osteoporosis  Benign neoplasm of colon  Hip arthritis  Insomnia   Hypertension   Hypercholesterolemia   Calculus of kidney  Tobacco use disorder    Past Surgical History:    Appendectomy   Arthroscopy knee  Cholecystectomy   Coronary angiogram with stents x2  Hernia repair   Jejunal bypass, taken down, gastric banding - Later removed   Rotator cuff repair   Hysterectomy    section   Lithotripsy   Colonoscopy     Family History:    Diabetes   Heart disease   Breast cancer     Social History:  Presents unaccompanied.   PCP: No Ref-Primary, Physician     Physical Exam     Patient Vitals for the past 24 hrs:   BP Temp Pulse Resp SpO2   21 2117 111/63 -- 87 18 99 %   21 1700 123/70 98.1  F (36.7  C) 82 18 98 %     Physical Exam  General: Patient is awake, alert and interactive when I enter the room  Head: The scalp, face, and head appear normal  Eyes: The pupils are equal, round, and reactive to light. Conjunctivae and sclerae are normal  ENT: External acoustic canals are normal. The oropharynx is normal without erythema. Uvula is in the midline.  Bilateral TMs free of foreign body, erythema, bulging TM or loss of light reflex.  Neck: Normal range of motion.   CV: Regular rate and rhythm.   Resp: Lungs are clear without  wheezes or rales. No respiratory distress.   GI: Abdomen is soft, no rigidity, guarding, or rebound. No distension. No tenderness to palpation in any quadrant.     MS: Normal tone.   Skin: No rash or lesions noted. Normal capillary refill noted.  No evidence of bug bites.  Neuro: Oriented to person place and time.  Face is symmetric. Moving all extremities.   Psych: Anxious appearing, somewhat pressured speech.  Patient reports bugs crawling in embedding themselves in her skin.  She specifically points out areas around her earlobes where she can extract the bugs by pinching on her skin with a tweezers.  Denies any suicidal or homicidal ideation.  However she admits to ongoing anxiety and depression but reports that it has been better recently as she is gotten settled in [place]    Emergency Department Course     Imaging:  CT Head w/o Contrast   Final Result   IMPRESSION:   1.  No acute intracranial process.        Report per radiology    Laboratory:  Labs Ordered and Resulted from Time of ED Arrival to Time of ED Departure   COMPREHENSIVE METABOLIC PANEL - Abnormal       Result Value    Sodium 142      Potassium 4.2      Chloride 112 (*)     Carbon Dioxide (CO2) 24      Anion Gap 6      Urea Nitrogen 23      Creatinine 0.49 (*)     Calcium 8.7      Glucose 101 (*)     Alkaline Phosphatase 127      AST 17      ALT 21      Protein Total 6.6 (*)     Albumin 3.3 (*)     Bilirubin Total 0.4      GFR Estimate >90     ROUTINE UA WITH MICROSCOPIC REFLEX TO CULTURE - Abnormal    Color Urine Yellow      Appearance Urine Clear      Glucose Urine Negative      Bilirubin Urine Negative      Ketones Urine Negative      Specific Gravity Urine 1.032      Blood Urine Negative      pH Urine 6.0      Protein Albumin Urine 10  (*)     Urobilinogen Urine 4.0 (*)     Nitrite Urine Negative      Leukocyte Esterase Urine Large (*)     Mucus Urine Present (*)     RBC Urine 10 (*)     WBC Urine 70 (*)     Squamous Epithelials Urine 2 (*)     CBC WITH PLATELETS AND DIFFERENTIAL - Abnormal    WBC Count 6.1      RBC Count 4.15      Hemoglobin 11.8      Hematocrit 37.9      MCV 91      MCH 28.4      MCHC 31.1 (*)     RDW 14.0      Platelet Count 250      % Neutrophils 62      % Lymphocytes 24      % Monocytes 10      % Eosinophils 2      % Basophils 1      % Immature Granulocytes 1      NRBCs per 100 WBC 0      Absolute Neutrophils 3.9      Absolute Lymphocytes 1.5      Absolute Monocytes 0.6      Absolute Eosinophils 0.1      Absolute Basophils 0.1      Absolute Immature Granulocytes 0.0      Absolute NRBCs 0.0     AMMONIA - Normal    Ammonia 27     LIPASE - Normal    Lipase 82     LACTIC ACID WHOLE BLOOD - Normal    Lactic Acid 0.9     ETHYL ALCOHOL LEVEL - Normal    Alcohol ethyl <0.01     INFLUENZA A/B & SARS-COV2 PCR MULTIPLEX - Normal    Influenza A target Negative      Influenza B target Negative      SARS CoV2 PCR Negative     DRUG ABUSE SCREEN 1 URINE (ED) - Normal    Amphetamines Urine Screen Negative      Barbiturates Urine Screen Negative      Benzodiazepines Urine Screen Negative      Cannabinoids Urine Screen Negative      Cocaine Urine Screen Negative      Opiates Urine Screen Negative     URINE CULTURE        Emergency Department Course:    Reviewed:  I reviewed nursing notes, vitals, past medical history and Care Everywhere    Assessments:  1706 I obtained history and examined the patient as noted above.   2022 I rechecked the patient and explained findings.  Patient's daughter is at the bedside who reports that occasionally when the patient wakes up from sleep that she is confused and disoriented.  Currently after talking with her she reports that she has returned to baseline.  Patient reports that she believes that she was dreaming when she was talking to me about her skin being crawling with bugs.  She now notes recognition that this is a delusion.  Discussed treating with antibiotics and following closely at care  facility.    Interventions:  1721 Olanzapine, 10 mg, PO  1721 Ondansetron, 4 mg, PO  2042 Ceftriaxone, 2 g, IV     Disposition:  The patient was discharged to home.     Impression & Plan     Medical Decision Making:  Sera is a 76-year-old woman who presents emergency department with delusions and altered mental status.  On initial evaluation she is hemodynamically stable with normal vital signs.  She is afebrile.  Broad work-up was initiated to investigate the patient's altered mental status.  Ultimately the patient was found to have evidence of a UTI.  This was treated with antibiotics in the emergency department.  On reevaluation her delusions and mental status have cleared the patient's daughter reports that she is back to baseline.  I discussed observation versus home with antibiotics.  Both patient and daughter would prefer to be treated at home.  Again she was given a first dose of antibiotics in the emergency department and remained hemodynamically stable while under my care.  No significant sinister pathology was noted      Diagnosis:    ICD-10-CM    1. Urinary tract infection without hematuria, site unspecified  N39.0    2. Delusion of infestation (H)  F22        Discharge Medications:  New Prescriptions    CEPHALEXIN (KEFLEX) 500 MG CAPSULE    Take 1 capsule (500 mg) by mouth 4 times daily for 7 days       Scribe Disclosure:  I, Tonja Arian, am serving as a scribe at 4:51 PM on 11/22/2021 to document services personally performed by Eric Story MD based on my observations and the provider's statements to me.            Eric Story MD  11/22/21 [id number]

## 2021-11-22 NOTE — ED TRIAGE NOTES
Presents via EMS from senior independent living facility with complaints of bugs crawling around her head since yesterday. Today hallucinations are worsening and she called  for help. Per ems VSS on RA, hx of anxiety and depression.

## 2021-11-22 NOTE — ED NOTES
Bed: ED05  Expected date: 11/22/21  Expected time: 4:31 PM  Means of arrival: Ambulance  Comments:  Sony Henriquez- alisa dee 75 yo

## 2021-11-24 LAB — BACTERIA UR CULT: NORMAL

## 2022-01-01 ENCOUNTER — HEALTH MAINTENANCE LETTER (OUTPATIENT)
Age: 77
End: 2022-01-01

## 2022-02-12 ENCOUNTER — HEALTH MAINTENANCE LETTER (OUTPATIENT)
Age: 77
End: 2022-02-12

## 2022-06-04 ENCOUNTER — HEALTH MAINTENANCE LETTER (OUTPATIENT)
Age: 77
End: 2022-06-04

## 2023-01-01 ENCOUNTER — APPOINTMENT (OUTPATIENT)
Dept: PHYSICAL THERAPY | Facility: CLINIC | Age: 78
DRG: 280 | End: 2023-01-01
Payer: MEDICARE

## 2023-01-01 ENCOUNTER — APPOINTMENT (OUTPATIENT)
Dept: GENERAL RADIOLOGY | Facility: CLINIC | Age: 78
DRG: 280 | End: 2023-01-01
Attending: HOSPITALIST
Payer: MEDICARE

## 2023-01-01 ENCOUNTER — APPOINTMENT (OUTPATIENT)
Dept: OCCUPATIONAL THERAPY | Facility: CLINIC | Age: 78
DRG: 280 | End: 2023-01-01
Payer: MEDICARE

## 2023-01-01 ENCOUNTER — APPOINTMENT (OUTPATIENT)
Dept: GENERAL RADIOLOGY | Facility: CLINIC | Age: 78
DRG: 280 | End: 2023-01-01
Attending: INTERNAL MEDICINE
Payer: MEDICARE

## 2023-01-01 ENCOUNTER — APPOINTMENT (OUTPATIENT)
Dept: CT IMAGING | Facility: CLINIC | Age: 78
End: 2023-01-01
Attending: EMERGENCY MEDICINE
Payer: MEDICARE

## 2023-01-01 ENCOUNTER — APPOINTMENT (OUTPATIENT)
Dept: GENERAL RADIOLOGY | Facility: CLINIC | Age: 78
End: 2023-01-01
Attending: EMERGENCY MEDICINE
Payer: MEDICARE

## 2023-01-01 ENCOUNTER — HOSPITAL ENCOUNTER (EMERGENCY)
Facility: CLINIC | Age: 78
Discharge: HOME | End: 2023-07-09
Attending: EMERGENCY MEDICINE | Admitting: EMERGENCY MEDICINE
Payer: MEDICARE

## 2023-01-01 ENCOUNTER — APPOINTMENT (OUTPATIENT)
Dept: PHYSICAL THERAPY | Facility: CLINIC | Age: 78
DRG: 280 | End: 2023-01-01
Attending: INTERNAL MEDICINE
Payer: MEDICARE

## 2023-01-01 ENCOUNTER — APPOINTMENT (OUTPATIENT)
Dept: OCCUPATIONAL THERAPY | Facility: CLINIC | Age: 78
DRG: 280 | End: 2023-01-01
Attending: INTERNAL MEDICINE
Payer: MEDICARE

## 2023-01-01 ENCOUNTER — APPOINTMENT (OUTPATIENT)
Dept: SPEECH THERAPY | Facility: CLINIC | Age: 78
DRG: 280 | End: 2023-01-01
Attending: INTERNAL MEDICINE
Payer: MEDICARE

## 2023-01-01 ENCOUNTER — HOSPITAL ENCOUNTER (INPATIENT)
Facility: CLINIC | Age: 78
LOS: 6 days | Discharge: NURSING, REHAB OR LONG-TERM CARE | DRG: 280 | End: 2023-06-21
Attending: EMERGENCY MEDICINE | Admitting: INTERNAL MEDICINE
Payer: MEDICARE

## 2023-01-01 ENCOUNTER — APPOINTMENT (OUTPATIENT)
Dept: CARDIOLOGY | Facility: CLINIC | Age: 78
DRG: 280 | End: 2023-01-01
Attending: INTERNAL MEDICINE
Payer: MEDICARE

## 2023-01-01 ENCOUNTER — HEALTH MAINTENANCE LETTER (OUTPATIENT)
Age: 78
End: 2023-01-01

## 2023-01-01 ENCOUNTER — LAB REQUISITION (OUTPATIENT)
Dept: LAB | Facility: CLINIC | Age: 78
End: 2023-01-01
Payer: MEDICARE

## 2023-01-01 ENCOUNTER — APPOINTMENT (OUTPATIENT)
Dept: SPEECH THERAPY | Facility: CLINIC | Age: 78
DRG: 280 | End: 2023-01-01
Payer: MEDICARE

## 2023-01-01 VITALS
HEART RATE: 73 BPM | SYSTOLIC BLOOD PRESSURE: 89 MMHG | HEIGHT: 60 IN | WEIGHT: 121.9 LBS | RESPIRATION RATE: 18 BRPM | TEMPERATURE: 97.9 F | OXYGEN SATURATION: 96 % | DIASTOLIC BLOOD PRESSURE: 60 MMHG | BODY MASS INDEX: 23.93 KG/M2

## 2023-01-01 VITALS
OXYGEN SATURATION: 92 % | SYSTOLIC BLOOD PRESSURE: 100 MMHG | RESPIRATION RATE: 16 BRPM | HEIGHT: 60 IN | HEART RATE: 79 BPM | BODY MASS INDEX: 23.16 KG/M2 | DIASTOLIC BLOOD PRESSURE: 61 MMHG | TEMPERATURE: 97.4 F | WEIGHT: 118 LBS

## 2023-01-01 DIAGNOSIS — I21.4 NSTEMI (NON-ST ELEVATED MYOCARDIAL INFARCTION) (H): ICD-10-CM

## 2023-01-01 DIAGNOSIS — G89.4 CHRONIC PAIN SYNDROME: ICD-10-CM

## 2023-01-01 DIAGNOSIS — R91.1 PULMONARY NODULE: ICD-10-CM

## 2023-01-01 DIAGNOSIS — I51.81 STRESS-INDUCED CARDIOMYOPATHY: ICD-10-CM

## 2023-01-01 DIAGNOSIS — Q24.8 LEFT VENTRICULAR OUTFLOW TRACT OBSTRUCTION: Primary | ICD-10-CM

## 2023-01-01 DIAGNOSIS — D64.9 MILD ANEMIA: ICD-10-CM

## 2023-01-01 DIAGNOSIS — S50.02XA CONTUSION OF LEFT ELBOW, INITIAL ENCOUNTER: ICD-10-CM

## 2023-01-01 DIAGNOSIS — E86.1 HYPOTENSION DUE TO HYPOVOLEMIA: ICD-10-CM

## 2023-01-01 DIAGNOSIS — E78.5 HYPERLIPIDEMIA LDL GOAL <70: ICD-10-CM

## 2023-01-01 DIAGNOSIS — S60.212A CONTUSION OF LEFT WRIST, INITIAL ENCOUNTER: ICD-10-CM

## 2023-01-01 DIAGNOSIS — R13.10 DYSPHAGIA, UNSPECIFIED TYPE: ICD-10-CM

## 2023-01-01 DIAGNOSIS — T07.XXXA MULTIPLE ABRASIONS: ICD-10-CM

## 2023-01-01 DIAGNOSIS — M79.662 PAIN OF LEFT LOWER LEG: ICD-10-CM

## 2023-01-01 DIAGNOSIS — Z11.1 ENCOUNTER FOR SCREENING FOR RESPIRATORY TUBERCULOSIS: ICD-10-CM
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
## 2023-01-01 LAB
ALBUMIN SERPL BCG-MCNC: 3.2 G/DL (ref 3.5–5.2)
ALBUMIN SERPL BCG-MCNC: 3.4 G/DL (ref 3.5–5.2)
ALP SERPL-CCNC: 69 U/L (ref 35–104)
ALP SERPL-CCNC: 76 U/L (ref 35–104)
ALT SERPL W P-5'-P-CCNC: 11 U/L (ref 0–50)
ALT SERPL W P-5'-P-CCNC: 15 U/L (ref 0–50)
ANION GAP SERPL CALCULATED.3IONS-SCNC: 11 MMOL/L (ref 7–15)
ANION GAP SERPL CALCULATED.3IONS-SCNC: 11 MMOL/L (ref 7–15)
ANION GAP SERPL CALCULATED.3IONS-SCNC: 7 MMOL/L (ref 7–15)
ANION GAP SERPL CALCULATED.3IONS-SCNC: 7 MMOL/L (ref 7–15)
APTT PPP: 27 SECONDS (ref 22–38)
AST SERPL W P-5'-P-CCNC: 21 U/L (ref 0–45)
AST SERPL W P-5'-P-CCNC: 24 U/L (ref 0–45)
ATRIAL RATE - MUSE: 72 BPM
ATRIAL RATE - MUSE: 78 BPM
BASE EXCESS BLDV CALC-SCNC: 1.5 MMOL/L (ref -7.7–1.9)
BASOPHILS # BLD AUTO: 0 10E3/UL (ref 0–0.2)
BASOPHILS # BLD AUTO: 0.1 10E3/UL (ref 0–0.2)
BASOPHILS # BLD AUTO: 0.1 10E3/UL (ref 0–0.2)
BASOPHILS NFR BLD AUTO: 1 %
BILIRUB SERPL-MCNC: 0.3 MG/DL
BILIRUB SERPL-MCNC: 0.8 MG/DL
BUN SERPL-MCNC: 14.2 MG/DL (ref 8–23)
BUN SERPL-MCNC: 16.4 MG/DL (ref 8–23)
BUN SERPL-MCNC: 22 MG/DL (ref 8–23)
BUN SERPL-MCNC: 7.9 MG/DL (ref 8–23)
CALCIUM SERPL-MCNC: 8.3 MG/DL (ref 8.8–10.2)
CALCIUM SERPL-MCNC: 8.4 MG/DL (ref 8.8–10.2)
CALCIUM SERPL-MCNC: 8.4 MG/DL (ref 8.8–10.2)
CALCIUM SERPL-MCNC: 9 MG/DL (ref 8.8–10.2)
CATH EF ESTIMATED: 35 %
CHLORIDE SERPL-SCNC: 102 MMOL/L (ref 98–107)
CHLORIDE SERPL-SCNC: 106 MMOL/L (ref 98–107)
CHLORIDE SERPL-SCNC: 107 MMOL/L (ref 98–107)
CHLORIDE SERPL-SCNC: 110 MMOL/L (ref 98–107)
CHOLEST SERPL-MCNC: 187 MG/DL
CREAT SERPL-MCNC: 0.44 MG/DL (ref 0.51–0.95)
CREAT SERPL-MCNC: 0.47 MG/DL (ref 0.51–0.95)
CREAT SERPL-MCNC: 0.52 MG/DL (ref 0.51–0.95)
CREAT SERPL-MCNC: 0.59 MG/DL (ref 0.51–0.95)
D DIMER PPP FEU-MCNC: <0.27 UG/ML FEU (ref 0–0.5)
DEPRECATED HCO3 PLAS-SCNC: 18 MMOL/L (ref 22–29)
DEPRECATED HCO3 PLAS-SCNC: 23 MMOL/L (ref 22–29)
DEPRECATED HCO3 PLAS-SCNC: 24 MMOL/L (ref 22–29)
DEPRECATED HCO3 PLAS-SCNC: 25 MMOL/L (ref 22–29)
DIASTOLIC BLOOD PRESSURE - MUSE: NORMAL MMHG
DIASTOLIC BLOOD PRESSURE - MUSE: NORMAL MMHG
EOSINOPHIL # BLD AUTO: 0 10E3/UL (ref 0–0.7)
EOSINOPHIL # BLD AUTO: 0.1 10E3/UL (ref 0–0.7)
EOSINOPHIL # BLD AUTO: 0.1 10E3/UL (ref 0–0.7)
EOSINOPHIL NFR BLD AUTO: 1 %
EOSINOPHIL NFR BLD AUTO: 2 %
EOSINOPHIL NFR BLD AUTO: 3 %
ERYTHROCYTE [DISTWIDTH] IN BLOOD BY AUTOMATED COUNT: 17.7 % (ref 10–15)
ERYTHROCYTE [DISTWIDTH] IN BLOOD BY AUTOMATED COUNT: 17.9 % (ref 10–15)
ERYTHROCYTE [DISTWIDTH] IN BLOOD BY AUTOMATED COUNT: 18.2 % (ref 10–15)
ERYTHROCYTE [DISTWIDTH] IN BLOOD BY AUTOMATED COUNT: 18.4 % (ref 10–15)
ETHANOL SERPL-MCNC: <0.01 G/DL
GAMMA INTERFERON BACKGROUND BLD IA-ACNC: 0.54 IU/ML
GFR SERPL CREATININE-BSD FRML MDRD: >90 ML/MIN/1.73M2
GLUCOSE BLDC GLUCOMTR-MCNC: 88 MG/DL (ref 70–99)
GLUCOSE SERPL-MCNC: 104 MG/DL (ref 70–99)
GLUCOSE SERPL-MCNC: 93 MG/DL (ref 70–99)
GLUCOSE SERPL-MCNC: 97 MG/DL (ref 70–99)
GLUCOSE SERPL-MCNC: 99 MG/DL (ref 70–99)
HCO3 BLDV-SCNC: 25 MMOL/L (ref 21–28)
HCT VFR BLD AUTO: 33.6 % (ref 35–47)
HCT VFR BLD AUTO: 35.4 % (ref 35–47)
HCT VFR BLD AUTO: 36.7 % (ref 35–47)
HCT VFR BLD AUTO: 37.4 % (ref 35–47)
HDLC SERPL-MCNC: 48 MG/DL
HGB BLD-MCNC: 10.8 G/DL (ref 11.7–15.7)
HGB BLD-MCNC: 11.4 G/DL (ref 11.7–15.7)
HGB BLD-MCNC: 11.5 G/DL (ref 11.7–15.7)
HGB BLD-MCNC: 12.2 G/DL (ref 11.7–15.7)
IMM GRANULOCYTES # BLD: 0 10E3/UL
IMM GRANULOCYTES NFR BLD: 0 %
IMM GRANULOCYTES NFR BLD: 1 %
IMM GRANULOCYTES NFR BLD: 1 %
INR PPP: 0.95 (ref 0.85–1.15)
INTERPRETATION ECG - MUSE: NORMAL
INTERPRETATION ECG - MUSE: NORMAL
LACTATE SERPL-SCNC: 1.8 MMOL/L (ref 0.7–2)
LDLC SERPL CALC-MCNC: 118 MG/DL
LVEF ECHO: NORMAL
LVEF ECHO: NORMAL
LYMPHOCYTES # BLD AUTO: 1.3 10E3/UL (ref 0.8–5.3)
LYMPHOCYTES # BLD AUTO: 1.4 10E3/UL (ref 0.8–5.3)
LYMPHOCYTES # BLD AUTO: 2.4 10E3/UL (ref 0.8–5.3)
LYMPHOCYTES NFR BLD AUTO: 23 %
LYMPHOCYTES NFR BLD AUTO: 28 %
LYMPHOCYTES NFR BLD AUTO: 33 %
M TB IFN-G BLD-IMP: NEGATIVE
M TB IFN-G CD4+ BCKGRND COR BLD-ACNC: 9.46 IU/ML
MCH RBC QN AUTO: 29 PG (ref 26.5–33)
MCH RBC QN AUTO: 29.3 PG (ref 26.5–33)
MCH RBC QN AUTO: 29.4 PG (ref 26.5–33)
MCH RBC QN AUTO: 29.9 PG (ref 26.5–33)
MCHC RBC AUTO-ENTMCNC: 31.3 G/DL (ref 31.5–36.5)
MCHC RBC AUTO-ENTMCNC: 32.1 G/DL (ref 31.5–36.5)
MCHC RBC AUTO-ENTMCNC: 32.2 G/DL (ref 31.5–36.5)
MCHC RBC AUTO-ENTMCNC: 32.6 G/DL (ref 31.5–36.5)
MCV RBC AUTO: 90 FL (ref 78–100)
MCV RBC AUTO: 91 FL (ref 78–100)
MCV RBC AUTO: 93 FL (ref 78–100)
MCV RBC AUTO: 93 FL (ref 78–100)
MITOGEN IGNF BCKGRD COR BLD-ACNC: -0.48 IU/ML
MITOGEN IGNF BCKGRD COR BLD-ACNC: -0.49 IU/ML
MONOCYTES # BLD AUTO: 0.6 10E3/UL (ref 0–1.3)
MONOCYTES # BLD AUTO: 0.7 10E3/UL (ref 0–1.3)
MONOCYTES # BLD AUTO: 0.8 10E3/UL (ref 0–1.3)
MONOCYTES NFR BLD AUTO: 11 %
MONOCYTES NFR BLD AUTO: 12 %
MONOCYTES NFR BLD AUTO: 12 %
NEUTROPHILS # BLD AUTO: 2.7 10E3/UL (ref 1.6–8.3)
NEUTROPHILS # BLD AUTO: 3.6 10E3/UL (ref 1.6–8.3)
NEUTROPHILS # BLD AUTO: 4 10E3/UL (ref 1.6–8.3)
NEUTROPHILS NFR BLD AUTO: 53 %
NEUTROPHILS NFR BLD AUTO: 56 %
NEUTROPHILS NFR BLD AUTO: 61 %
NONHDLC SERPL-MCNC: 139 MG/DL
NRBC # BLD AUTO: 0 10E3/UL
NRBC BLD AUTO-RTO: 0 /100
NT-PROBNP SERPL-MCNC: ABNORMAL PG/ML (ref 0–1800)
O2/TOTAL GAS SETTING VFR VENT: 21 %
P AXIS - MUSE: 60 DEGREES
P AXIS - MUSE: NORMAL DEGREES
PATH REPORT.COMMENTS IMP SPEC: NORMAL
PATH REPORT.COMMENTS IMP SPEC: NORMAL
PATH REPORT.FINAL DX SPEC: NORMAL
PATH REPORT.GROSS SPEC: NORMAL
PATH REPORT.MICROSCOPIC SPEC OTHER STN: NORMAL
PATH REPORT.RELEVANT HX SPEC: NORMAL
PCO2 BLDV: 36 MM HG (ref 40–50)
PH BLDV: 7.45 [PH] (ref 7.32–7.43)
PHOTO IMAGE: NORMAL
PLATELET # BLD AUTO: 191 10E3/UL (ref 150–450)
PLATELET # BLD AUTO: 258 10E3/UL (ref 150–450)
PLATELET # BLD AUTO: 265 10E3/UL (ref 150–450)
PLATELET # BLD AUTO: 306 10E3/UL (ref 150–450)
PO2 BLDV: 23 MM HG (ref 25–47)
POTASSIUM SERPL-SCNC: 4 MMOL/L (ref 3.4–5.3)
POTASSIUM SERPL-SCNC: 4.1 MMOL/L (ref 3.4–5.3)
POTASSIUM SERPL-SCNC: 4.3 MMOL/L (ref 3.4–5.3)
POTASSIUM SERPL-SCNC: 4.5 MMOL/L (ref 3.4–5.3)
PR INTERVAL - MUSE: 146 MS
PR INTERVAL - MUSE: 182 MS
PROT SERPL-MCNC: 5.2 G/DL (ref 6.4–8.3)
PROT SERPL-MCNC: 5.8 G/DL (ref 6.4–8.3)
QRS DURATION - MUSE: 90 MS
QRS DURATION - MUSE: 96 MS
QT - MUSE: 408 MS
QT - MUSE: 444 MS
QTC - MUSE: 446 MS
QTC - MUSE: 506 MS
QUANTIFERON MITOGEN: 10 IU/ML
QUANTIFERON NIL TUBE: 0.54 IU/ML
QUANTIFERON TB1 TUBE: 0.05 IU/ML
QUANTIFERON TB2 TUBE: 0.06
R AXIS - MUSE: 132 DEGREES
R AXIS - MUSE: 53 DEGREES
RBC # BLD AUTO: 3.61 10E6/UL (ref 3.8–5.2)
RBC # BLD AUTO: 3.89 10E6/UL (ref 3.8–5.2)
RBC # BLD AUTO: 3.96 10E6/UL (ref 3.8–5.2)
RBC # BLD AUTO: 4.15 10E6/UL (ref 3.8–5.2)
SODIUM SERPL-SCNC: 136 MMOL/L (ref 136–145)
SODIUM SERPL-SCNC: 137 MMOL/L (ref 136–145)
SODIUM SERPL-SCNC: 139 MMOL/L (ref 136–145)
SODIUM SERPL-SCNC: 139 MMOL/L (ref 136–145)
SYSTOLIC BLOOD PRESSURE - MUSE: NORMAL MMHG
SYSTOLIC BLOOD PRESSURE - MUSE: NORMAL MMHG
T AXIS - MUSE: 233 DEGREES
T AXIS - MUSE: 260 DEGREES
T4 FREE SERPL-MCNC: 0.95 NG/DL (ref 0.9–1.7)
TRIGL SERPL-MCNC: 106 MG/DL
TROPONIN T SERPL HS-MCNC: 106 NG/L
TROPONIN T SERPL HS-MCNC: 106 NG/L
TROPONIN T SERPL HS-MCNC: 114 NG/L
TSH SERPL DL<=0.005 MIU/L-ACNC: 6.78 UIU/ML (ref 0.3–4.2)
UFH PPP CHRO-ACNC: 0.17 IU/ML
UPPER GI ENDOSCOPY: NORMAL
VENTRICULAR RATE- MUSE: 72 BPM
VENTRICULAR RATE- MUSE: 78 BPM
WBC # BLD AUTO: 4.7 10E3/UL (ref 4–11)
WBC # BLD AUTO: 5.8 10E3/UL (ref 4–11)
WBC # BLD AUTO: 6.7 10E3/UL (ref 4–11)
WBC # BLD AUTO: 7.4 10E3/UL (ref 4–11)

## 2023-01-01 PROCEDURE — 250N000013 HC RX MED GY IP 250 OP 250 PS 637: Performed by: INTERNAL MEDICINE

## 2023-01-01 PROCEDURE — 258N000003 HC RX IP 258 OP 636: Performed by: INTERNAL MEDICINE

## 2023-01-01 PROCEDURE — 99239 HOSP IP/OBS DSCHRG MGMT >30: CPT | Performed by: HOSPITALIST

## 2023-01-01 PROCEDURE — 85027 COMPLETE CBC AUTOMATED: CPT | Performed by: INTERNAL MEDICINE

## 2023-01-01 PROCEDURE — P9603 ONE-WAY ALLOW PRORATED MILES: HCPCS | Performed by: INTERNAL MEDICINE

## 2023-01-01 PROCEDURE — 99285 EMERGENCY DEPT VISIT HI MDM: CPT | Mod: 25

## 2023-01-01 PROCEDURE — 74230 X-RAY XM SWLNG FUNCJ C+: CPT

## 2023-01-01 PROCEDURE — 97110 THERAPEUTIC EXERCISES: CPT | Mod: GP

## 2023-01-01 PROCEDURE — 120N000001 HC R&B MED SURG/OB

## 2023-01-01 PROCEDURE — 36415 COLL VENOUS BLD VENIPUNCTURE: CPT | Performed by: INTERNAL MEDICINE

## 2023-01-01 PROCEDURE — 97530 THERAPEUTIC ACTIVITIES: CPT | Mod: GP

## 2023-01-01 PROCEDURE — 93005 ELECTROCARDIOGRAM TRACING: CPT

## 2023-01-01 PROCEDURE — 85025 COMPLETE CBC W/AUTO DIFF WBC: CPT | Performed by: HOSPITALIST

## 2023-01-01 PROCEDURE — 272N000001 HC OR GENERAL SUPPLY STERILE: Performed by: INTERNAL MEDICINE

## 2023-01-01 PROCEDURE — 99232 SBSQ HOSP IP/OBS MODERATE 35: CPT | Performed by: HOSPITALIST

## 2023-01-01 PROCEDURE — 4A023N7 MEASUREMENT OF CARDIAC SAMPLING AND PRESSURE, LEFT HEART, PERCUTANEOUS APPROACH: ICD-10-PCS | Performed by: INTERNAL MEDICINE

## 2023-01-01 PROCEDURE — 97530 THERAPEUTIC ACTIVITIES: CPT | Mod: GP | Performed by: PHYSICAL THERAPIST

## 2023-01-01 PROCEDURE — 85730 THROMBOPLASTIN TIME PARTIAL: CPT | Performed by: EMERGENCY MEDICINE

## 2023-01-01 PROCEDURE — 255N000002 HC RX 255 OP 636: Performed by: INTERNAL MEDICINE

## 2023-01-01 PROCEDURE — 88305 TISSUE EXAM BY PATHOLOGIST: CPT | Mod: 26 | Performed by: PATHOLOGY

## 2023-01-01 PROCEDURE — 250N000011 HC RX IP 250 OP 636: Performed by: INTERNAL MEDICINE

## 2023-01-01 PROCEDURE — 250N000013 HC RX MED GY IP 250 OP 250 PS 637: Performed by: HOSPITALIST

## 2023-01-01 PROCEDURE — 97165 OT EVAL LOW COMPLEX 30 MIN: CPT | Mod: GO | Performed by: OCCUPATIONAL THERAPIST

## 2023-01-01 PROCEDURE — 85014 HEMATOCRIT: CPT | Performed by: EMERGENCY MEDICINE

## 2023-01-01 PROCEDURE — 93321 DOPPLER ECHO F-UP/LMTD STD: CPT | Mod: 26 | Performed by: INTERNAL MEDICINE

## 2023-01-01 PROCEDURE — 99152 MOD SED SAME PHYS/QHP 5/>YRS: CPT | Performed by: INTERNAL MEDICINE

## 2023-01-01 PROCEDURE — 99232 SBSQ HOSP IP/OBS MODERATE 35: CPT | Performed by: INTERNAL MEDICINE

## 2023-01-01 PROCEDURE — 99233 SBSQ HOSP IP/OBS HIGH 50: CPT | Performed by: HOSPITALIST

## 2023-01-01 PROCEDURE — B2111ZZ FLUOROSCOPY OF MULTIPLE CORONARY ARTERIES USING LOW OSMOLAR CONTRAST: ICD-10-PCS | Performed by: INTERNAL MEDICINE

## 2023-01-01 PROCEDURE — 250N000011 HC RX IP 250 OP 636

## 2023-01-01 PROCEDURE — 86481 TB AG RESPONSE T-CELL SUSP: CPT | Performed by: INTERNAL MEDICINE

## 2023-01-01 PROCEDURE — 96360 HYDRATION IV INFUSION INIT: CPT | Mod: 59

## 2023-01-01 PROCEDURE — 250N000013 HC RX MED GY IP 250 OP 250 PS 637: Performed by: NURSE PRACTITIONER

## 2023-01-01 PROCEDURE — 88305 TISSUE EXAM BY PATHOLOGIST: CPT | Mod: TC | Performed by: INTERNAL MEDICINE

## 2023-01-01 PROCEDURE — 93308 TTE F-UP OR LMTD: CPT | Mod: 26 | Performed by: INTERNAL MEDICINE

## 2023-01-01 PROCEDURE — 83880 ASSAY OF NATRIURETIC PEPTIDE: CPT | Performed by: EMERGENCY MEDICINE

## 2023-01-01 PROCEDURE — 99232 SBSQ HOSP IP/OBS MODERATE 35: CPT | Mod: FS | Performed by: NURSE PRACTITIONER

## 2023-01-01 PROCEDURE — 36415 COLL VENOUS BLD VENIPUNCTURE: CPT | Performed by: EMERGENCY MEDICINE

## 2023-01-01 PROCEDURE — B2151ZZ FLUOROSCOPY OF LEFT HEART USING LOW OSMOLAR CONTRAST: ICD-10-PCS | Performed by: INTERNAL MEDICINE

## 2023-01-01 PROCEDURE — 250N000009 HC RX 250: Performed by: EMERGENCY MEDICINE

## 2023-01-01 PROCEDURE — 250N000011 HC RX IP 250 OP 636: Performed by: EMERGENCY MEDICINE

## 2023-01-01 PROCEDURE — 99223 1ST HOSP IP/OBS HIGH 75: CPT | Mod: AI | Performed by: INTERNAL MEDICINE

## 2023-01-01 PROCEDURE — 84439 ASSAY OF FREE THYROXINE: CPT | Performed by: HOSPITALIST

## 2023-01-01 PROCEDURE — 43239 EGD BIOPSY SINGLE/MULTIPLE: CPT | Performed by: INTERNAL MEDICINE

## 2023-01-01 PROCEDURE — 92610 EVALUATE SWALLOWING FUNCTION: CPT | Mod: GN

## 2023-01-01 PROCEDURE — 70450 CT HEAD/BRAIN W/O DYE: CPT | Mod: MA

## 2023-01-01 PROCEDURE — 36415 COLL VENOUS BLD VENIPUNCTURE: CPT | Performed by: HOSPITALIST

## 2023-01-01 PROCEDURE — 99233 SBSQ HOSP IP/OBS HIGH 50: CPT | Mod: FS | Performed by: NURSE PRACTITIONER

## 2023-01-01 PROCEDURE — 250N000013 HC RX MED GY IP 250 OP 250 PS 637: Performed by: EMERGENCY MEDICINE

## 2023-01-01 PROCEDURE — 96360 HYDRATION IV INFUSION INIT: CPT

## 2023-01-01 PROCEDURE — 73110 X-RAY EXAM OF WRIST: CPT | Mod: LT

## 2023-01-01 PROCEDURE — 73590 X-RAY EXAM OF LOWER LEG: CPT | Mod: LT

## 2023-01-01 PROCEDURE — 93325 DOPPLER ECHO COLOR FLOW MAPG: CPT | Mod: 26 | Performed by: INTERNAL MEDICINE

## 2023-01-01 PROCEDURE — G0500 MOD SEDAT ENDO SERVICE >5YRS: HCPCS | Performed by: INTERNAL MEDICINE

## 2023-01-01 PROCEDURE — 85610 PROTHROMBIN TIME: CPT | Performed by: EMERGENCY MEDICINE

## 2023-01-01 PROCEDURE — 99223 1ST HOSP IP/OBS HIGH 75: CPT | Mod: 25 | Performed by: INTERNAL MEDICINE

## 2023-01-01 PROCEDURE — 93458 L HRT ARTERY/VENTRICLE ANGIO: CPT | Performed by: INTERNAL MEDICINE

## 2023-01-01 PROCEDURE — 92526 ORAL FUNCTION THERAPY: CPT | Mod: GN

## 2023-01-01 PROCEDURE — 85520 HEPARIN ASSAY: CPT | Performed by: INTERNAL MEDICINE

## 2023-01-01 PROCEDURE — 99207 PR NO BILLABLE SERVICE THIS VISIT: CPT | Performed by: INTERNAL MEDICINE

## 2023-01-01 PROCEDURE — 97162 PT EVAL MOD COMPLEX 30 MIN: CPT | Mod: GP

## 2023-01-01 PROCEDURE — 80053 COMPREHEN METABOLIC PANEL: CPT | Performed by: EMERGENCY MEDICINE

## 2023-01-01 PROCEDURE — 71046 X-RAY EXAM CHEST 2 VIEWS: CPT

## 2023-01-01 PROCEDURE — 258N000003 HC RX IP 258 OP 636: Performed by: EMERGENCY MEDICINE

## 2023-01-01 PROCEDURE — 83605 ASSAY OF LACTIC ACID: CPT | Performed by: EMERGENCY MEDICINE

## 2023-01-01 PROCEDURE — 97110 THERAPEUTIC EXERCISES: CPT | Mod: GO

## 2023-01-01 PROCEDURE — 93325 DOPPLER ECHO COLOR FLOW MAPG: CPT

## 2023-01-01 PROCEDURE — 93458 L HRT ARTERY/VENTRICLE ANGIO: CPT | Mod: 26 | Performed by: INTERNAL MEDICINE

## 2023-01-01 PROCEDURE — 85379 FIBRIN DEGRADATION QUANT: CPT | Performed by: EMERGENCY MEDICINE

## 2023-01-01 PROCEDURE — 88312 SPECIAL STAINS GROUP 1: CPT | Mod: 26 | Performed by: PATHOLOGY

## 2023-01-01 PROCEDURE — 97535 SELF CARE MNGMENT TRAINING: CPT | Mod: GO

## 2023-01-01 PROCEDURE — 96361 HYDRATE IV INFUSION ADD-ON: CPT

## 2023-01-01 PROCEDURE — 92526 ORAL FUNCTION THERAPY: CPT | Mod: GN | Performed by: SPEECH-LANGUAGE PATHOLOGIST

## 2023-01-01 PROCEDURE — 97535 SELF CARE MNGMENT TRAINING: CPT | Mod: GO | Performed by: OCCUPATIONAL THERAPIST

## 2023-01-01 PROCEDURE — 250N000009 HC RX 250: Performed by: INTERNAL MEDICINE

## 2023-01-01 PROCEDURE — 82077 ASSAY SPEC XCP UR&BREATH IA: CPT | Performed by: EMERGENCY MEDICINE

## 2023-01-01 PROCEDURE — 80061 LIPID PANEL: CPT | Performed by: INTERNAL MEDICINE

## 2023-01-01 PROCEDURE — 99232 SBSQ HOSP IP/OBS MODERATE 35: CPT | Mod: 25 | Performed by: NURSE PRACTITIONER

## 2023-01-01 PROCEDURE — 999N000208 ECHOCARDIOGRAM COMPLETE

## 2023-01-01 PROCEDURE — C1894 INTRO/SHEATH, NON-LASER: HCPCS | Performed by: INTERNAL MEDICINE

## 2023-01-01 PROCEDURE — 84484 ASSAY OF TROPONIN QUANT: CPT | Performed by: EMERGENCY MEDICINE

## 2023-01-01 PROCEDURE — 73080 X-RAY EXAM OF ELBOW: CPT | Mod: LT

## 2023-01-01 PROCEDURE — C1887 CATHETER, GUIDING: HCPCS | Performed by: INTERNAL MEDICINE

## 2023-01-01 PROCEDURE — 0DB58ZX EXCISION OF ESOPHAGUS, VIA NATURAL OR ARTIFICIAL OPENING ENDOSCOPIC, DIAGNOSTIC: ICD-10-PCS | Performed by: INTERNAL MEDICINE

## 2023-01-01 PROCEDURE — C1769 GUIDE WIRE: HCPCS | Performed by: INTERNAL MEDICINE

## 2023-01-01 PROCEDURE — 92611 MOTION FLUOROSCOPY/SWALLOW: CPT | Mod: GN | Performed by: SPEECH-LANGUAGE PATHOLOGIST

## 2023-01-01 PROCEDURE — 82310 ASSAY OF CALCIUM: CPT | Performed by: INTERNAL MEDICINE

## 2023-01-01 PROCEDURE — 93306 TTE W/DOPPLER COMPLETE: CPT | Mod: 26 | Performed by: INTERNAL MEDICINE

## 2023-01-01 PROCEDURE — 80048 BASIC METABOLIC PNL TOTAL CA: CPT | Performed by: HOSPITALIST

## 2023-01-01 PROCEDURE — 72128 CT CHEST SPINE W/O DYE: CPT | Mod: MA

## 2023-01-01 PROCEDURE — 73502 X-RAY EXAM HIP UNI 2-3 VIEWS: CPT

## 2023-01-01 PROCEDURE — 255N000002 HC RX 255 OP 636: Performed by: EMERGENCY MEDICINE

## 2023-01-01 PROCEDURE — 84484 ASSAY OF TROPONIN QUANT: CPT | Performed by: INTERNAL MEDICINE

## 2023-01-01 PROCEDURE — 84443 ASSAY THYROID STIM HORMONE: CPT | Performed by: HOSPITALIST

## 2023-01-01 PROCEDURE — 74177 CT ABD & PELVIS W/CONTRAST: CPT | Mod: MA

## 2023-01-01 PROCEDURE — 82803 BLOOD GASES ANY COMBINATION: CPT | Performed by: EMERGENCY MEDICINE

## 2023-01-01 RX ORDER — IOPAMIDOL 755 MG/ML
500 INJECTION, SOLUTION INTRAVASCULAR ONCE
Status: COMPLETED | OUTPATIENT
Start: 2023-01-01 | End: 2023-01-01

## 2023-01-01 RX ORDER — ATORVASTATIN CALCIUM 40 MG/1
40 TABLET, FILM COATED ORAL EVERY EVENING
Status: DISCONTINUED | OUTPATIENT
Start: 2023-01-01 | End: 2023-01-01 | Stop reason: HOSPADM

## 2023-01-01 RX ORDER — ACETAMINOPHEN 325 MG/1
650 TABLET ORAL EVERY 4 HOURS PRN
Status: DISCONTINUED | OUTPATIENT
Start: 2023-01-01 | End: 2023-01-01

## 2023-01-01 RX ORDER — LIDOCAINE 40 MG/G
CREAM TOPICAL
Status: DISCONTINUED | OUTPATIENT
Start: 2023-01-01 | End: 2023-01-01

## 2023-01-01 RX ORDER — NITROGLYCERIN 5 MG/ML
VIAL (ML) INTRAVENOUS
Status: DISCONTINUED | OUTPATIENT
Start: 2023-01-01 | End: 2023-01-01

## 2023-01-01 RX ORDER — OXYCODONE HYDROCHLORIDE 5 MG/1
10 TABLET ORAL EVERY 4 HOURS PRN
Status: DISCONTINUED | OUTPATIENT
Start: 2023-01-01 | End: 2023-01-01 | Stop reason: HOSPADM

## 2023-01-01 RX ORDER — VERAPAMIL HYDROCHLORIDE 2.5 MG/ML
INJECTION, SOLUTION INTRAVENOUS
Status: DISCONTINUED | OUTPATIENT
Start: 2023-01-01 | End: 2023-01-01

## 2023-01-01 RX ORDER — LORAZEPAM 2 MG/ML
0.5 INJECTION INTRAMUSCULAR
Status: DISCONTINUED | OUTPATIENT
Start: 2023-01-01 | End: 2023-01-01

## 2023-01-01 RX ORDER — DIPHENHYDRAMINE HCL 25 MG
25 CAPSULE ORAL
Status: DISCONTINUED | OUTPATIENT
Start: 2023-01-01 | End: 2023-01-01 | Stop reason: HOSPADM

## 2023-01-01 RX ORDER — NALOXONE HYDROCHLORIDE 0.4 MG/ML
0.4 INJECTION, SOLUTION INTRAMUSCULAR; INTRAVENOUS; SUBCUTANEOUS
Status: DISCONTINUED | OUTPATIENT
Start: 2023-01-01 | End: 2023-01-01 | Stop reason: HOSPADM

## 2023-01-01 RX ORDER — NITROGLYCERIN 0.4 MG/1
0.4 TABLET SUBLINGUAL EVERY 5 MIN PRN
COMMUNITY

## 2023-01-01 RX ORDER — ATROPINE SULFATE 0.1 MG/ML
0.5 INJECTION INTRAVENOUS
Status: ACTIVE | OUTPATIENT
Start: 2023-01-01 | End: 2023-01-01

## 2023-01-01 RX ORDER — VERAPAMIL HYDROCHLORIDE 2.5 MG/ML
INJECTION, SOLUTION INTRAVENOUS
Status: DISCONTINUED
Start: 2023-01-01 | End: 2023-01-01 | Stop reason: HOSPADM

## 2023-01-01 RX ORDER — ONDANSETRON 2 MG/ML
4 INJECTION INTRAMUSCULAR; INTRAVENOUS EVERY 6 HOURS PRN
Status: DISCONTINUED | OUTPATIENT
Start: 2023-01-01 | End: 2023-01-01 | Stop reason: HOSPADM

## 2023-01-01 RX ORDER — ASPIRIN 81 MG/1
81 TABLET ORAL DAILY
Status: DISCONTINUED | OUTPATIENT
Start: 2023-01-01 | End: 2023-01-01 | Stop reason: HOSPADM

## 2023-01-01 RX ORDER — FENTANYL CITRATE-0.9 % NACL/PF 10 MCG/ML
PLASTIC BAG, INJECTION (ML) INTRAVENOUS
Status: DISCONTINUED
Start: 2023-01-01 | End: 2023-01-01 | Stop reason: WASHOUT

## 2023-01-01 RX ORDER — CALCIUM CARBONATE 500 MG/1
1000 TABLET, CHEWABLE ORAL 3 TIMES DAILY PRN
Status: DISCONTINUED | OUTPATIENT
Start: 2023-01-01 | End: 2023-01-01 | Stop reason: HOSPADM

## 2023-01-01 RX ORDER — MIDODRINE HYDROCHLORIDE 2.5 MG/1
2.5 TABLET ORAL
Status: DISCONTINUED | OUTPATIENT
Start: 2023-01-01 | End: 2023-01-01

## 2023-01-01 RX ORDER — NITROGLYCERIN 5 MG/ML
VIAL (ML) INTRAVENOUS
Status: DISCONTINUED
Start: 2023-01-01 | End: 2023-01-01 | Stop reason: HOSPADM

## 2023-01-01 RX ORDER — MULTIPLE VITAMINS W/ MINERALS TAB 9MG-400MCG
1 TAB ORAL DAILY
Status: DISCONTINUED | OUTPATIENT
Start: 2023-01-01 | End: 2023-01-01 | Stop reason: HOSPADM

## 2023-01-01 RX ORDER — POTASSIUM CHLORIDE 1500 MG/1
20 TABLET, EXTENDED RELEASE ORAL
Status: DISCONTINUED | OUTPATIENT
Start: 2023-01-01 | End: 2023-01-01

## 2023-01-01 RX ORDER — MIDODRINE HYDROCHLORIDE 5 MG/1
5 TABLET ORAL
Status: DISCONTINUED | OUTPATIENT
Start: 2023-01-01 | End: 2023-01-01

## 2023-01-01 RX ORDER — NALOXONE HYDROCHLORIDE 0.4 MG/ML
0.2 INJECTION, SOLUTION INTRAMUSCULAR; INTRAVENOUS; SUBCUTANEOUS
Status: ACTIVE | OUTPATIENT
Start: 2023-01-01 | End: 2023-01-01

## 2023-01-01 RX ORDER — OXYCODONE HYDROCHLORIDE 5 MG/1
5 TABLET ORAL EVERY 4 HOURS PRN
Status: DISCONTINUED | OUTPATIENT
Start: 2023-01-01 | End: 2023-01-01 | Stop reason: HOSPADM

## 2023-01-01 RX ORDER — HEPARIN SODIUM 10000 [USP'U]/100ML
0-5000 INJECTION, SOLUTION INTRAVENOUS CONTINUOUS
Status: DISCONTINUED | OUTPATIENT
Start: 2023-01-01 | End: 2023-01-01

## 2023-01-01 RX ORDER — NALOXONE HYDROCHLORIDE 0.4 MG/ML
0.2 INJECTION, SOLUTION INTRAMUSCULAR; INTRAVENOUS; SUBCUTANEOUS
Status: DISCONTINUED | OUTPATIENT
Start: 2023-01-01 | End: 2023-01-01 | Stop reason: HOSPADM

## 2023-01-01 RX ORDER — ASPIRIN 325 MG
325 TABLET, DELAYED RELEASE (ENTERIC COATED) ORAL ONCE
Status: COMPLETED | OUTPATIENT
Start: 2023-01-01 | End: 2023-01-01

## 2023-01-01 RX ORDER — SODIUM CHLORIDE 9 MG/ML
INJECTION, SOLUTION INTRAVENOUS CONTINUOUS
Status: DISCONTINUED | OUTPATIENT
Start: 2023-01-01 | End: 2023-01-01

## 2023-01-01 RX ORDER — PANTOPRAZOLE SODIUM 40 MG/1
40 TABLET, DELAYED RELEASE ORAL DAILY
Status: DISCONTINUED | OUTPATIENT
Start: 2023-01-01 | End: 2023-01-01 | Stop reason: HOSPADM

## 2023-01-01 RX ORDER — SODIUM CHLORIDE 9 MG/ML
75 INJECTION, SOLUTION INTRAVENOUS CONTINUOUS
Status: ACTIVE | OUTPATIENT
Start: 2023-01-01 | End: 2023-01-01

## 2023-01-01 RX ORDER — FENTANYL CITRATE 50 UG/ML
100 INJECTION, SOLUTION INTRAMUSCULAR; INTRAVENOUS
Status: DISCONTINUED | OUTPATIENT
Start: 2023-01-01 | End: 2023-01-01 | Stop reason: HOSPADM

## 2023-01-01 RX ORDER — MIDODRINE HYDROCHLORIDE 10 MG/1
10 TABLET ORAL
Start: 2023-01-01

## 2023-01-01 RX ORDER — IOPAMIDOL 755 MG/ML
INJECTION, SOLUTION INTRAVASCULAR
Status: DISCONTINUED | OUTPATIENT
Start: 2023-01-01 | End: 2023-01-01

## 2023-01-01 RX ORDER — ATORVASTATIN CALCIUM 40 MG/1
40 TABLET, FILM COATED ORAL EVERY EVENING
Start: 2023-01-01

## 2023-01-01 RX ORDER — HEPARIN SODIUM 1000 [USP'U]/ML
INJECTION, SOLUTION INTRAVENOUS; SUBCUTANEOUS
Status: DISCONTINUED
Start: 2023-01-01 | End: 2023-01-01 | Stop reason: HOSPADM

## 2023-01-01 RX ORDER — ACETAMINOPHEN 325 MG/1
650 TABLET ORAL EVERY 6 HOURS PRN
Status: DISCONTINUED | OUTPATIENT
Start: 2023-01-01 | End: 2023-01-01 | Stop reason: HOSPADM

## 2023-01-01 RX ORDER — LEVOTHYROXINE SODIUM 75 UG/1
75 TABLET ORAL DAILY
Status: DISCONTINUED | OUTPATIENT
Start: 2023-01-01 | End: 2023-01-01 | Stop reason: HOSPADM

## 2023-01-01 RX ORDER — LORAZEPAM 0.5 MG/1
0.5 TABLET ORAL
Status: DISCONTINUED | OUTPATIENT
Start: 2023-01-01 | End: 2023-01-01

## 2023-01-01 RX ORDER — LIDOCAINE 40 MG/G
CREAM TOPICAL
Status: DISCONTINUED | OUTPATIENT
Start: 2023-01-01 | End: 2023-01-01 | Stop reason: HOSPADM

## 2023-01-01 RX ORDER — HYDROXYZINE HYDROCHLORIDE 25 MG/1
50 TABLET, FILM COATED ORAL EVERY 6 HOURS PRN
Status: DISCONTINUED | OUTPATIENT
Start: 2023-01-01 | End: 2023-01-01 | Stop reason: HOSPADM

## 2023-01-01 RX ORDER — POLYETHYLENE GLYCOL 3350 17 G/17G
17 POWDER, FOR SOLUTION ORAL DAILY PRN
Status: DISCONTINUED | OUTPATIENT
Start: 2023-01-01 | End: 2023-01-01 | Stop reason: HOSPADM

## 2023-01-01 RX ORDER — SODIUM CHLORIDE, SODIUM LACTATE, POTASSIUM CHLORIDE, CALCIUM CHLORIDE 600; 310; 30; 20 MG/100ML; MG/100ML; MG/100ML; MG/100ML
INJECTION, SOLUTION INTRAVENOUS CONTINUOUS
Status: DISCONTINUED | OUTPATIENT
Start: 2023-01-01 | End: 2023-01-01 | Stop reason: HOSPADM

## 2023-01-01 RX ORDER — FLUMAZENIL 0.1 MG/ML
0.2 INJECTION, SOLUTION INTRAVENOUS
Status: ACTIVE | OUTPATIENT
Start: 2023-01-01 | End: 2023-01-01

## 2023-01-01 RX ORDER — MECLIZINE HCL 12.5 MG 12.5 MG/1
12.5 TABLET ORAL 3 TIMES DAILY PRN
Status: DISCONTINUED | OUTPATIENT
Start: 2023-01-01 | End: 2023-01-01 | Stop reason: HOSPADM

## 2023-01-01 RX ORDER — ATORVASTATIN CALCIUM 20 MG/1
20 TABLET, FILM COATED ORAL DAILY
Status: DISCONTINUED | OUTPATIENT
Start: 2023-01-01 | End: 2023-01-01

## 2023-01-01 RX ORDER — ASPIRIN 81 MG/1
243 TABLET, CHEWABLE ORAL ONCE
Status: DISCONTINUED | OUTPATIENT
Start: 2023-01-01 | End: 2023-01-01

## 2023-01-01 RX ORDER — MIDODRINE HYDROCHLORIDE 5 MG/1
10 TABLET ORAL
Status: DISCONTINUED | OUTPATIENT
Start: 2023-01-01 | End: 2023-01-01 | Stop reason: HOSPADM

## 2023-01-01 RX ORDER — DOCUSATE SODIUM 100 MG/1
100 CAPSULE, LIQUID FILLED ORAL PRN
COMMUNITY

## 2023-01-01 RX ORDER — ACETAMINOPHEN 650 MG/1
650 SUPPOSITORY RECTAL EVERY 6 HOURS PRN
Status: DISCONTINUED | OUTPATIENT
Start: 2023-01-01 | End: 2023-01-01 | Stop reason: HOSPADM

## 2023-01-01 RX ORDER — LIDOCAINE HYDROCHLORIDE 10 MG/ML
INJECTION, SOLUTION EPIDURAL; INFILTRATION; INTRACAUDAL; PERINEURAL
Status: DISCONTINUED
Start: 2023-01-01 | End: 2023-01-01 | Stop reason: HOSPADM

## 2023-01-01 RX ORDER — HYDROCODONE BITARTRATE AND ACETAMINOPHEN 5; 325 MG/1; MG/1
1 TABLET ORAL EVERY 6 HOURS PRN
Qty: 12 TABLET | Refills: 0 | Status: SHIPPED | OUTPATIENT
Start: 2023-01-01 | End: 2023-01-01

## 2023-01-01 RX ORDER — HYDROCODONE BITARTRATE AND ACETAMINOPHEN 5; 325 MG/1; MG/1
1 TABLET ORAL 3 TIMES DAILY
Status: ON HOLD | COMMUNITY
End: 2023-01-01

## 2023-01-01 RX ORDER — NALOXONE HYDROCHLORIDE 0.4 MG/ML
0.4 INJECTION, SOLUTION INTRAMUSCULAR; INTRAVENOUS; SUBCUTANEOUS
Status: ACTIVE | OUTPATIENT
Start: 2023-01-01 | End: 2023-01-01

## 2023-01-01 RX ORDER — HYDROCODONE BITARTRATE AND ACETAMINOPHEN 5; 325 MG/1; MG/1
1 TABLET ORAL EVERY 6 HOURS PRN
Status: DISCONTINUED | OUTPATIENT
Start: 2023-01-01 | End: 2023-01-01 | Stop reason: HOSPADM

## 2023-01-01 RX ORDER — AMOXICILLIN 250 MG
2 CAPSULE ORAL 2 TIMES DAILY PRN
Status: DISCONTINUED | OUTPATIENT
Start: 2023-01-01 | End: 2023-01-01 | Stop reason: HOSPADM

## 2023-01-01 RX ORDER — TRAZODONE HYDROCHLORIDE 100 MG/1
100 TABLET ORAL
Status: DISCONTINUED | OUTPATIENT
Start: 2023-01-01 | End: 2023-01-01 | Stop reason: HOSPADM

## 2023-01-01 RX ORDER — DULOXETIN HYDROCHLORIDE 20 MG/1
60 CAPSULE, DELAYED RELEASE ORAL 2 TIMES DAILY
Status: DISCONTINUED | OUTPATIENT
Start: 2023-01-01 | End: 2023-01-01 | Stop reason: HOSPADM

## 2023-01-01 RX ORDER — ASPIRIN 325 MG
325 TABLET ORAL ONCE
Status: DISCONTINUED | OUTPATIENT
Start: 2023-01-01 | End: 2023-01-01

## 2023-01-01 RX ORDER — DIPHENHYDRAMINE HCL 25 MG
25 TABLET ORAL AT BEDTIME
COMMUNITY

## 2023-01-01 RX ORDER — FENTANYL CITRATE 50 UG/ML
INJECTION, SOLUTION INTRAMUSCULAR; INTRAVENOUS
Status: DISCONTINUED
Start: 2023-01-01 | End: 2023-01-01 | Stop reason: HOSPADM

## 2023-01-01 RX ORDER — ONDANSETRON 4 MG/1
4 TABLET, ORALLY DISINTEGRATING ORAL EVERY 6 HOURS PRN
Status: DISCONTINUED | OUTPATIENT
Start: 2023-01-01 | End: 2023-01-01 | Stop reason: HOSPADM

## 2023-01-01 RX ORDER — FENTANYL CITRATE 50 UG/ML
25 INJECTION, SOLUTION INTRAMUSCULAR; INTRAVENOUS
Status: COMPLETED | OUTPATIENT
Start: 2023-01-01 | End: 2023-01-01

## 2023-01-01 RX ORDER — FENTANYL CITRATE 50 UG/ML
INJECTION, SOLUTION INTRAMUSCULAR; INTRAVENOUS
Status: DISCONTINUED | OUTPATIENT
Start: 2023-01-01 | End: 2023-01-01

## 2023-01-01 RX ORDER — AMOXICILLIN 250 MG
1 CAPSULE ORAL 2 TIMES DAILY PRN
Status: DISCONTINUED | OUTPATIENT
Start: 2023-01-01 | End: 2023-01-01 | Stop reason: HOSPADM

## 2023-01-01 RX ADMIN — ATORVASTATIN CALCIUM 40 MG: 40 TABLET, FILM COATED ORAL at 20:25

## 2023-01-01 RX ADMIN — DULOXETINE HYDROCHLORIDE 60 MG: 30 CAPSULE, DELAYED RELEASE ORAL at 09:59

## 2023-01-01 RX ADMIN — LEVOTHYROXINE SODIUM 75 MCG: 0.07 TABLET ORAL at 09:00

## 2023-01-01 RX ADMIN — CARBIDOPA AND LEVODOPA 2.5 MG: 50; 200 TABLET, EXTENDED RELEASE ORAL at 11:54

## 2023-01-01 RX ADMIN — MULTIPLE VITAMINS W/ MINERALS TAB 1 TABLET: TAB at 09:00

## 2023-01-01 RX ADMIN — OXYCODONE HYDROCHLORIDE 5 MG: 5 TABLET ORAL at 20:42

## 2023-01-01 RX ADMIN — OXYCODONE HYDROCHLORIDE 10 MG: 5 TABLET ORAL at 23:50

## 2023-01-01 RX ADMIN — OXYCODONE HYDROCHLORIDE 10 MG: 5 TABLET ORAL at 02:20

## 2023-01-01 RX ADMIN — SODIUM CHLORIDE 500 ML: 9 INJECTION, SOLUTION INTRAVENOUS at 02:15

## 2023-01-01 RX ADMIN — OXYCODONE HYDROCHLORIDE 5 MG: 5 TABLET ORAL at 09:38

## 2023-01-01 RX ADMIN — ATORVASTATIN CALCIUM 40 MG: 40 TABLET, FILM COATED ORAL at 19:58

## 2023-01-01 RX ADMIN — HEPARIN SODIUM AND DEXTROSE 650 UNITS/HR: 10000; 5 INJECTION INTRAVENOUS at 00:56

## 2023-01-01 RX ADMIN — ASPIRIN 81 MG: 81 TABLET, COATED ORAL at 08:50

## 2023-01-01 RX ADMIN — DOCUSATE SODIUM AND SENNOSIDES 2 TABLET: 8.6; 5 TABLET, FILM COATED ORAL at 14:10

## 2023-01-01 RX ADMIN — LEVOTHYROXINE SODIUM 75 MCG: 0.07 TABLET ORAL at 08:33

## 2023-01-01 RX ADMIN — OXYCODONE HYDROCHLORIDE 10 MG: 5 TABLET ORAL at 11:52

## 2023-01-01 RX ADMIN — TRAZODONE HYDROCHLORIDE 100 MG: 100 TABLET ORAL at 23:52

## 2023-01-01 RX ADMIN — ONDANSETRON 4 MG: 4 TABLET, ORALLY DISINTEGRATING ORAL at 20:21

## 2023-01-01 RX ADMIN — ASPIRIN 81 MG: 81 TABLET, COATED ORAL at 08:12

## 2023-01-01 RX ADMIN — ASPIRIN 325 MG: 325 TABLET ORAL at 00:55

## 2023-01-01 RX ADMIN — CARBIDOPA AND LEVODOPA 2.5 MG: 50; 200 TABLET, EXTENDED RELEASE ORAL at 09:29

## 2023-01-01 RX ADMIN — HYDROCODONE BITARTRATE AND ACETAMINOPHEN 1 TABLET: 5; 325 TABLET ORAL at 20:25

## 2023-01-01 RX ADMIN — TRAZODONE HYDROCHLORIDE 100 MG: 100 TABLET ORAL at 20:22

## 2023-01-01 RX ADMIN — ONDANSETRON 4 MG: 4 TABLET, ORALLY DISINTEGRATING ORAL at 17:43

## 2023-01-01 RX ADMIN — SODIUM CHLORIDE 1000 ML: 9 INJECTION, SOLUTION INTRAVENOUS at 22:54

## 2023-01-01 RX ADMIN — DOCUSATE SODIUM AND SENNOSIDES 2 TABLET: 8.6; 5 TABLET, FILM COATED ORAL at 14:47

## 2023-01-01 RX ADMIN — CARBIDOPA AND LEVODOPA 2.5 MG: 50; 200 TABLET, EXTENDED RELEASE ORAL at 12:00

## 2023-01-01 RX ADMIN — ATORVASTATIN CALCIUM 40 MG: 40 TABLET, FILM COATED ORAL at 20:43

## 2023-01-01 RX ADMIN — CARBIDOPA AND LEVODOPA 2.5 MG: 50; 200 TABLET, EXTENDED RELEASE ORAL at 08:50

## 2023-01-01 RX ADMIN — PANTOPRAZOLE SODIUM 40 MG: 40 TABLET, DELAYED RELEASE ORAL at 08:50

## 2023-01-01 RX ADMIN — SODIUM CHLORIDE 55 ML: 9 INJECTION, SOLUTION INTRAVENOUS at 10:46

## 2023-01-01 RX ADMIN — OXYCODONE HYDROCHLORIDE 10 MG: 5 TABLET ORAL at 14:05

## 2023-01-01 RX ADMIN — IOPAMIDOL 60 ML: 755 INJECTION, SOLUTION INTRAVENOUS at 10:46

## 2023-01-01 RX ADMIN — PANTOPRAZOLE SODIUM 40 MG: 40 TABLET, DELAYED RELEASE ORAL at 08:12

## 2023-01-01 RX ADMIN — LEVOTHYROXINE SODIUM 75 MCG: 0.07 TABLET ORAL at 08:06

## 2023-01-01 RX ADMIN — PANTOPRAZOLE SODIUM 40 MG: 40 TABLET, DELAYED RELEASE ORAL at 08:33

## 2023-01-01 RX ADMIN — CARBIDOPA AND LEVODOPA 2.5 MG: 50; 200 TABLET, EXTENDED RELEASE ORAL at 08:06

## 2023-01-01 RX ADMIN — FENTANYL CITRATE 50 MCG: 50 INJECTION, SOLUTION INTRAMUSCULAR; INTRAVENOUS at 12:49

## 2023-01-01 RX ADMIN — MIDODRINE HYDROCHLORIDE 5 MG: 5 TABLET ORAL at 13:04

## 2023-01-01 RX ADMIN — DULOXETINE HYDROCHLORIDE 60 MG: 30 CAPSULE, DELAYED RELEASE ORAL at 08:33

## 2023-01-01 RX ADMIN — HYDROCODONE BITARTRATE AND ACETAMINOPHEN 1 TABLET: 5; 325 TABLET ORAL at 09:36

## 2023-01-01 RX ADMIN — HUMAN ALBUMIN MICROSPHERES AND PERFLUTREN 3 ML: 10; .22 INJECTION, SOLUTION INTRAVENOUS at 13:50

## 2023-01-01 RX ADMIN — SODIUM CHLORIDE 1000 ML: 9 INJECTION, SOLUTION INTRAVENOUS at 13:04

## 2023-01-01 RX ADMIN — MULTIPLE VITAMINS W/ MINERALS TAB 1 TABLET: TAB at 08:50

## 2023-01-01 RX ADMIN — LEVOTHYROXINE SODIUM 75 MCG: 0.07 TABLET ORAL at 09:30

## 2023-01-01 RX ADMIN — ATORVASTATIN CALCIUM 40 MG: 40 TABLET, FILM COATED ORAL at 21:15

## 2023-01-01 RX ADMIN — DULOXETINE HYDROCHLORIDE 60 MG: 30 CAPSULE, DELAYED RELEASE ORAL at 19:18

## 2023-01-01 RX ADMIN — ACETAMINOPHEN 650 MG: 325 TABLET, FILM COATED ORAL at 08:06

## 2023-01-01 RX ADMIN — DIPHENHYDRAMINE HYDROCHLORIDE 25 MG: 25 CAPSULE ORAL at 02:14

## 2023-01-01 RX ADMIN — SODIUM CHLORIDE: 9 INJECTION, SOLUTION INTRAVENOUS at 23:46

## 2023-01-01 RX ADMIN — MULTIPLE VITAMINS W/ MINERALS TAB 1 TABLET: TAB at 08:33

## 2023-01-01 RX ADMIN — HYDROCODONE BITARTRATE AND ACETAMINOPHEN 1 TABLET: 5; 325 TABLET ORAL at 16:37

## 2023-01-01 RX ADMIN — LEVOTHYROXINE SODIUM 75 MCG: 0.07 TABLET ORAL at 09:59

## 2023-01-01 RX ADMIN — PANTOPRAZOLE SODIUM 40 MG: 40 TABLET, DELAYED RELEASE ORAL at 08:06

## 2023-01-01 RX ADMIN — ASPIRIN 81 MG: 81 TABLET, COATED ORAL at 09:59

## 2023-01-01 RX ADMIN — ASPIRIN 81 MG: 81 TABLET, COATED ORAL at 09:30

## 2023-01-01 RX ADMIN — CARBIDOPA AND LEVODOPA 2.5 MG: 50; 200 TABLET, EXTENDED RELEASE ORAL at 11:59

## 2023-01-01 RX ADMIN — OXYCODONE HYDROCHLORIDE 10 MG: 5 TABLET ORAL at 23:11

## 2023-01-01 RX ADMIN — FENTANYL CITRATE 25 MCG: 50 INJECTION, SOLUTION INTRAMUSCULAR; INTRAVENOUS at 12:53

## 2023-01-01 RX ADMIN — SODIUM CHLORIDE, POTASSIUM CHLORIDE, SODIUM LACTATE AND CALCIUM CHLORIDE 1000 ML: 600; 310; 30; 20 INJECTION, SOLUTION INTRAVENOUS at 10:22

## 2023-01-01 RX ADMIN — PANTOPRAZOLE SODIUM 40 MG: 40 TABLET, DELAYED RELEASE ORAL at 09:00

## 2023-01-01 RX ADMIN — OXYCODONE HYDROCHLORIDE 10 MG: 5 TABLET ORAL at 19:59

## 2023-01-01 RX ADMIN — ASPIRIN 81 MG: 81 TABLET, COATED ORAL at 08:06

## 2023-01-01 RX ADMIN — LEVOTHYROXINE SODIUM 75 MCG: 0.07 TABLET ORAL at 08:50

## 2023-01-01 RX ADMIN — OXYCODONE HYDROCHLORIDE 10 MG: 5 TABLET ORAL at 09:39

## 2023-01-01 RX ADMIN — DULOXETINE HYDROCHLORIDE 60 MG: 30 CAPSULE, DELAYED RELEASE ORAL at 20:43

## 2023-01-01 RX ADMIN — DULOXETINE HYDROCHLORIDE 60 MG: 30 CAPSULE, DELAYED RELEASE ORAL at 19:58

## 2023-01-01 RX ADMIN — HYDROCODONE BITARTRATE AND ACETAMINOPHEN 1 TABLET: 5; 325 TABLET ORAL at 02:14

## 2023-01-01 RX ADMIN — DOCUSATE SODIUM AND SENNOSIDES 2 TABLET: 8.6; 5 TABLET, FILM COATED ORAL at 21:08

## 2023-01-01 RX ADMIN — CARBIDOPA AND LEVODOPA 2.5 MG: 50; 200 TABLET, EXTENDED RELEASE ORAL at 17:43

## 2023-01-01 RX ADMIN — DULOXETINE HYDROCHLORIDE 60 MG: 30 CAPSULE, DELAYED RELEASE ORAL at 08:49

## 2023-01-01 RX ADMIN — ATORVASTATIN CALCIUM 40 MG: 40 TABLET, FILM COATED ORAL at 20:41

## 2023-01-01 RX ADMIN — OXYCODONE HYDROCHLORIDE 5 MG: 5 TABLET ORAL at 10:35

## 2023-01-01 RX ADMIN — MIDODRINE HYDROCHLORIDE 10 MG: 5 TABLET ORAL at 13:09

## 2023-01-01 RX ADMIN — CARBIDOPA AND LEVODOPA 2.5 MG: 50; 200 TABLET, EXTENDED RELEASE ORAL at 18:17

## 2023-01-01 RX ADMIN — MIDODRINE HYDROCHLORIDE 5 MG: 5 TABLET ORAL at 09:00

## 2023-01-01 RX ADMIN — OXYCODONE HYDROCHLORIDE 10 MG: 5 TABLET ORAL at 17:49

## 2023-01-01 RX ADMIN — CARBIDOPA AND LEVODOPA 2.5 MG: 50; 200 TABLET, EXTENDED RELEASE ORAL at 17:49

## 2023-01-01 RX ADMIN — CARBIDOPA AND LEVODOPA 2.5 MG: 50; 200 TABLET, EXTENDED RELEASE ORAL at 12:07

## 2023-01-01 RX ADMIN — OXYCODONE HYDROCHLORIDE 10 MG: 5 TABLET ORAL at 10:55

## 2023-01-01 RX ADMIN — SODIUM CHLORIDE: 900 INJECTION INTRAVENOUS at 10:24

## 2023-01-01 RX ADMIN — MIDAZOLAM 0.5 MG: 1 INJECTION INTRAMUSCULAR; INTRAVENOUS at 12:53

## 2023-01-01 RX ADMIN — DULOXETINE HYDROCHLORIDE 60 MG: 30 CAPSULE, DELAYED RELEASE ORAL at 20:25

## 2023-01-01 RX ADMIN — Medication 1 MG: at 20:25

## 2023-01-01 RX ADMIN — CARBIDOPA AND LEVODOPA 2.5 MG: 50; 200 TABLET, EXTENDED RELEASE ORAL at 17:19

## 2023-01-01 RX ADMIN — SENNOSIDES AND DOCUSATE SODIUM 1 TABLET: 50; 8.6 TABLET ORAL at 14:31

## 2023-01-01 RX ADMIN — DULOXETINE HYDROCHLORIDE 60 MG: 30 CAPSULE, DELAYED RELEASE ORAL at 08:12

## 2023-01-01 RX ADMIN — MIDODRINE HYDROCHLORIDE 5 MG: 5 TABLET ORAL at 17:44

## 2023-01-01 RX ADMIN — ASPIRIN 81 MG: 81 TABLET, COATED ORAL at 08:33

## 2023-01-01 RX ADMIN — OXYCODONE HYDROCHLORIDE 5 MG: 5 TABLET ORAL at 11:59

## 2023-01-01 RX ADMIN — OXYCODONE HYDROCHLORIDE 10 MG: 5 TABLET ORAL at 13:08

## 2023-01-01 RX ADMIN — PANTOPRAZOLE SODIUM 40 MG: 40 TABLET, DELAYED RELEASE ORAL at 09:30

## 2023-01-01 RX ADMIN — CARBIDOPA AND LEVODOPA 2.5 MG: 50; 200 TABLET, EXTENDED RELEASE ORAL at 08:33

## 2023-01-01 RX ADMIN — OXYCODONE HYDROCHLORIDE 10 MG: 5 TABLET ORAL at 17:43

## 2023-01-01 RX ADMIN — CALCIUM CARBONATE (ANTACID) CHEW TAB 500 MG 1000 MG: 500 CHEW TAB at 23:53

## 2023-01-01 RX ADMIN — OXYCODONE HYDROCHLORIDE 10 MG: 5 TABLET ORAL at 04:19

## 2023-01-01 RX ADMIN — DULOXETINE HYDROCHLORIDE 60 MG: 30 CAPSULE, DELAYED RELEASE ORAL at 09:30

## 2023-01-01 RX ADMIN — MIDAZOLAM 1 MG: 1 INJECTION INTRAMUSCULAR; INTRAVENOUS at 12:49

## 2023-01-01 RX ADMIN — DULOXETINE HYDROCHLORIDE 60 MG: 30 CAPSULE, DELAYED RELEASE ORAL at 08:59

## 2023-01-01 RX ADMIN — OXYCODONE HYDROCHLORIDE 5 MG: 5 TABLET ORAL at 00:08

## 2023-01-01 RX ADMIN — MULTIPLE VITAMINS W/ MINERALS TAB 1 TABLET: TAB at 08:06

## 2023-01-01 RX ADMIN — OXYCODONE HYDROCHLORIDE 10 MG: 5 TABLET ORAL at 22:07

## 2023-01-01 RX ADMIN — HUMAN ALBUMIN MICROSPHERES AND PERFLUTREN 3 ML: 10; .22 INJECTION, SOLUTION INTRAVENOUS at 09:26

## 2023-01-01 RX ADMIN — ASPIRIN 81 MG: 81 TABLET, COATED ORAL at 09:00

## 2023-01-01 RX ADMIN — LEVOTHYROXINE SODIUM 75 MCG: 0.07 TABLET ORAL at 08:12

## 2023-01-01 RX ADMIN — OXYCODONE HYDROCHLORIDE 10 MG: 5 TABLET ORAL at 01:48

## 2023-01-01 RX ADMIN — OXYCODONE HYDROCHLORIDE 10 MG: 5 TABLET ORAL at 00:37

## 2023-01-01 RX ADMIN — OXYCODONE HYDROCHLORIDE 5 MG: 5 TABLET ORAL at 04:54

## 2023-01-01 RX ADMIN — DULOXETINE HYDROCHLORIDE 60 MG: 30 CAPSULE, DELAYED RELEASE ORAL at 20:41

## 2023-01-01 RX ADMIN — ATORVASTATIN CALCIUM 40 MG: 40 TABLET, FILM COATED ORAL at 19:18

## 2023-01-01 RX ADMIN — DULOXETINE HYDROCHLORIDE 60 MG: 30 CAPSULE, DELAYED RELEASE ORAL at 08:06

## 2023-01-01 RX ADMIN — DULOXETINE HYDROCHLORIDE 60 MG: 30 CAPSULE, DELAYED RELEASE ORAL at 21:15

## 2023-01-01 RX ADMIN — HEPARIN SODIUM AND DEXTROSE 800 UNITS/HR: 10000; 5 INJECTION INTRAVENOUS at 10:01

## 2023-01-01 RX ADMIN — OXYCODONE HYDROCHLORIDE 10 MG: 5 TABLET ORAL at 17:47

## 2023-01-01 RX ADMIN — MULTIPLE VITAMINS W/ MINERALS TAB 1 TABLET: TAB at 09:29

## 2023-01-01 RX ADMIN — PANTOPRAZOLE SODIUM 40 MG: 40 TABLET, DELAYED RELEASE ORAL at 09:59

## 2023-01-01 ASSESSMENT — ACTIVITIES OF DAILY LIVING (ADL)
ADLS_ACUITY_SCORE: 18.25
ADLS_ACUITY_SCORE: 18.25
ADLS_ACUITY_SCORE: 16.25
ADLS_ACUITY_SCORE: 18.25
ADLS_ACUITY_SCORE: 15.75
ADLS_ACUITY_SCORE: 18.25
ADLS_ACUITY_SCORE: 18.75
ADLS_ACUITY_SCORE: 18.25
ADLS_ACUITY_SCORE: 16.25
ADLS_ACUITY_SCORE: 15.75
ADLS_ACUITY_SCORE: 18.25
ADLS_ACUITY_SCORE: 16.25
ADLS_ACUITY_SCORE: 23.25
ADLS_ACUITY_SCORE: 18.25
ADLS_ACUITY_SCORE: 15.75
ADLS_ACUITY_SCORE: 21.25
ADLS_ACUITY_SCORE: 15.75
ADLS_ACUITY_SCORE: 21.25
ADLS_ACUITY_SCORE: 23.75
ADLS_ACUITY_SCORE: 18.25
ADLS_ACUITY_SCORE: 18.25
ADLS_ACUITY_SCORE: 23.25
ADLS_ACUITY_SCORE: 15.75
ADLS_ACUITY_SCORE: 18.75
ADLS_ACUITY_SCORE: 16.25
ADLS_ACUITY_SCORE: 16.25
ADLS_ACUITY_SCORE: 18.75
ADLS_ACUITY_SCORE: 23.75
ADLS_ACUITY_SCORE: 15.75
ADLS_ACUITY_SCORE: 19.25
ADLS_ACUITY_SCORE: 19.25
ADLS_ACUITY_SCORE: 21.25
ADLS_ACUITY_SCORE: 18.25
ADLS_ACUITY_SCORE: 23.75
ADLS_ACUITY_SCORE: 21.25
ADLS_ACUITY_SCORE: 19.25
ADLS_ACUITY_SCORE: 23.25
ADLS_ACUITY_SCORE: 18.25
ADLS_ACUITY_SCORE: 16.25
ADLS_ACUITY_SCORE: 16.25
ADLS_ACUITY_SCORE: 21.25
ADLS_ACUITY_SCORE: 15.75
ADLS_ACUITY_SCORE: 21.25
ADLS_ACUITY_SCORE: 16.25
ADLS_ACUITY_SCORE: 18.25
ADLS_ACUITY_SCORE: 20.75
ADLS_ACUITY_SCORE: 20.75
ADLS_ACUITY_SCORE: 16.25
ADLS_ACUITY_SCORE: 18.25
ADLS_ACUITY_SCORE: 23.25
ADLS_ACUITY_SCORE: 15.75
ADLS_ACUITY_SCORE: 18.25
ADLS_ACUITY_SCORE: 14.75
ADLS_ACUITY_SCORE: 23.75
ADLS_ACUITY_SCORE: 21.25
ADLS_ACUITY_SCORE: 21.25
ADLS_ACUITY_SCORE: 15.75
ADLS_ACUITY_SCORE: 18.25
ADLS_ACUITY_SCORE: 16.25
ADLS_ACUITY_SCORE: 19.25
ADLS_ACUITY_SCORE: 15.75
DEPENDENT_IADLS:: INDEPENDENT;TRANSPORTATION
ADLS_ACUITY_SCORE: 18.75
ADLS_ACUITY_SCORE: 16.25
ADLS_ACUITY_SCORE: 15.75
ADLS_ACUITY_SCORE: 21.25
ADLS_ACUITY_SCORE: 19.25
ADLS_ACUITY_SCORE: 18.25
ADLS_ACUITY_SCORE: 18.75
ADLS_ACUITY_SCORE: 14.75
ADLS_ACUITY_SCORE: 14.75
ADLS_ACUITY_SCORE: 15.75
ADLS_ACUITY_SCORE: 15.75
ADLS_ACUITY_SCORE: 23.25
ADLS_ACUITY_SCORE: 18.25
ADLS_ACUITY_SCORE: 18.25
ADLS_ACUITY_SCORE: 15.75
ADLS_ACUITY_SCORE: 18.25
ADLS_ACUITY_SCORE: 15.75
ADLS_ACUITY_SCORE: 21.25
ADLS_ACUITY_SCORE: 15.75
ADLS_ACUITY_SCORE: 18.25

## 2023-02-13 NOTE — PROGRESS NOTES
Current Issues/Problems reviewed on call: Continued instruction and review of the COPD Action Plan, including signs and symptoms of worsening COPD (symptoms of a potential exacerbation) and the recommended actions to take, including the importance of reporting symptoms early.   Details for Interventions/Education completed on call: Continued discussion on proper adherence to prescribed medication regime. Provided/reviewed COPD education and behaviors shown to positively influence one's wellness while reducing risk of disease progression.    Mrs Weinstein verbalizes and  recalls  understanding of previously provided COPD instruction. She verbalized my breathing is good, I am exercising regularly.   Goals reviewed     Mrs Weinstein lives with son who drives her [facility] [employer]. 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  She ambulates without device in home uses Rolator when going outside.      Mrs Weinstein is 02 dependent on 3L at rest and 4L with exertion. She uses rescue inhaler PRN and nebulizer treatment PRN. Positive chronic productive cough with white  mucous. Neg for wheezing, neg for chest tightness, positive of LUIS, neg for decrease in normal activity.  She sleeps n 2 pillow, neg for swelling ankles or feet. 02 sats >98%     Last appt with PCP 9/10/2022,  ([facility]), patient does not see pulmonologist at this time. Last hospitalization [facility] 2020 hernia repair.        -pt verbalized understanding and is utilizing COPD action plan  -pt verbalized s/s of COPD and intent to call PCP or pulmonologist when symptoms occur  -reviewed medications, proper use of inhaler, 02 safety (expiration date), proper use of nebulizer  -reviewed CAT form    CAT score 10 unchanged from last assessment.   -reviewed trigger  Reviewed COVID-19 preventative practices.       Mailings sent:see resources listed below       Education provided: COPD ation plan, s/s of COPD exacerbation  breathing techniques, 02 therapy, medication  Patient slept a total of 8.75 hours. No complaints made the whole shift.    adherence           Additional education provided (i.e., [place]) ________________________________________________       []  Continue Educational Path. Attempt next contact in 5 - 7 days as able.  []   Begin/continue Maintenance Path. Attempt next contact in 1 month as able.  Plan for next call: Evaluate patient's recall and understanding of previously provided COPD instruction, continue to promote a healthier lifestyle as related to the disease, and assess patient's level of commitment to report any COPD symptoms early to their clinician. Address unmet needs as appropriate and determine next steps.  The COPD Prescriptive Care Plan has been reviewed with the patient; patient has agreed to continue and follow the plan.

## 2023-06-15 PROBLEM — E86.1 HYPOTENSION DUE TO HYPOVOLEMIA: Status: ACTIVE | Noted: 2023-01-01

## 2023-06-15 PROBLEM — I21.4 NSTEMI (NON-ST ELEVATED MYOCARDIAL INFARCTION) (H): Status: ACTIVE | Noted: 2023-01-01

## 2023-06-15 PROBLEM — R13.10 DYSPHAGIA, UNSPECIFIED TYPE: Status: ACTIVE | Noted: 2023-01-01

## 2023-06-15 NOTE — PRE-PROCEDURE
GENERAL PRE-PROCEDURE:     Written consent obtained?: Yes    Risks and benefits: Risks, benefits and alternatives were discussed    Consent given by:  Patient  Patient states understanding of procedure being performed: Yes    Patient's understanding of procedure matches consent: Yes    Procedure consent matches procedure scheduled: Yes    Expected level of sedation:  Moderate  Appropriately NPO:  Yes  ASA Class:  4  Mallampati  :  Grade 1- soft palate, uvula, tonsillar pillars, and posterior pharyngeal wall visible  Lungs:  Lungs clear with good breath sounds bilaterally  Heart:  Normal heart sounds and rate  History & Physical reviewed:  History and physical reviewed and no updates needed  Statement of review:  I have reviewed the lab findings, diagnostic data, medications, and the plan for sedation

## 2023-06-15 NOTE — ED TRIAGE NOTES
Pt arrives from home, independent [facility] living, via EMS for 3 days of increasing shortness of breath. Pt has an appt to get tested for COPD on June 28th. Pt has decreased appetite and not much intake over last 3 months. Has lost 50 pounds in 3 months. Former smoker of 50 years, states she quit several weeks ago. Hypotensive, all other VSS

## 2023-06-15 NOTE — PROVIDER NOTIFICATION
Paged Dr. Bassett regarding BP 71/58 post angiogram. Patient reported dizziness/lightheadedness. Per Dr Bassett, consider vasopressors if MAP consistently less than 65. Most recent BP now 88/62 with MAP 71.

## 2023-06-15 NOTE — PHARMACY-ADMISSION MEDICATION HISTORY
Pharmacist Admission Medication History    Admission medication history is complete. The information provided in this note is only as accurate as the sources available at the time of the update.    Medication reconciliation/reorder completed by provider prior to medication history? Yes    Information Source(s): Patient via in-person    Pertinent Information: none    Changes made to PTA medication list:    Added: Colace 100mg, Norco 5-325mg, Nitroglycerin 0.4mg, Diphenhydramine 25mg     Deleted: Calcium-D, Cephalexin 500mg, Miconazole 2% powder    Changed: None    Medication Affordability:  Not including over the counter (OTC) medications, was there a time in the past 3 months when you did not take your medications as prescribed because of cost?: No    Allergies reviewed with patient and updates made in EHR: yes    Medication History Completed By: Morris Haque RPH 6/15/2023 9:32 AM    Prior to Admission medications    Medication Sig Last Dose Taking? Auth Provider Long Term End Date   aspirin EC 81 MG EC tablet Take 1 tablet (81 mg) by mouth daily 6/13/2023 Yes Roly Martinez,      atorvastatin (LIPITOR) 20 MG tablet Take 1 tablet (20 mg) by mouth daily 6/13/2023 Yes Shalini Simmons APRN CNP Yes    diphenhydrAMINE (BENADRYL) 25 MG tablet Take 25 mg by mouth At Bedtime Past Week Yes Unknown, Entered By History     docusate sodium (COLACE) 100 MG capsule Take 100 mg by mouth as needed for constipation  at PRN Yes Unknown, Entered By History     DULoxetine (CYMBALTA) 60 MG capsule Take 1 capsule (60 mg) by mouth 2 times daily 6/14/2023 Yes Shalini Simmons APRN CNP Yes    HYDROcodone-acetaminophen (NORCO) 5-325 MG tablet Take 1 tablet by mouth 3 times daily Past Week Yes Unknown, Entered By History No    hydrOXYzine (VISTARIL) 50 MG capsule Take 1 capsule (50 mg) by mouth every 6 hours as needed for anxiety Past Week Yes Shalini Simmons APRN CNP     levothyroxine (SYNTHROID/LEVOTHROID) 75  MCG tablet Take 1 tablet (75 mcg) by mouth daily 6/14/2023 Yes Shalini Simmons APRN CNP Yes    pantoprazole (PROTONIX) 40 MG EC tablet Take 1 tablet (40 mg) by mouth daily Past Week Yes Shalini Simmons APRN CNP     traZODone (DESYREL) 100 MG tablet Take 1 tablet (100 mg) by mouth nightly as needed for sleep Past Week Yes Avery Mcdermott MD Yes    nitroGLYcerin (NITROSTAT) 0.4 MG sublingual tablet Place 0.4 mg under the tongue every 5 minutes as needed for chest pain For chest pain place 1 tablet under the tongue every 5 minutes for 3 doses. If symptoms persist 5 minutes after 1st dose call 911.  at PRN  Unknown, Entered By History No

## 2023-06-15 NOTE — CONSULTS
[facility]    Cardiology Consultation     Date of Admission:  6/14/2023    Assessment & Plan   Sera Figueroa is a 78 year old female who was admitted on 6/14/2023.    1.  Elevated troponin, EKG changes and some intermittent chest discomfort.  This could represent non-ST elevation myocardial infarction.  Presently symptom-free.  Discussed option of medical management versus medical management plus coronary angiogram.  Risk benefits of coronary angiogram with risk including but not limited risk of stroke, MI, death, need for PRBC transfusion, emergent bypass surgery were discussed with patient.  Patient understand the rational of coronary angiogram the risk involved and wishes to proceed with it.  Continue remain NPO.  2.  Bedside review of echocardiogram shows mid to distal LV wall severe hypokinesia with base artie the best overall severely reduced LV systolic function some element of LVOT obstruction seen on color flow images.  This could represent stress cardiomyopathy versus ischemic cardiomyopathy.  3.  Hypertension combination of decreased oral intake and also underlying cardiomyopathy with possible LVOT obstruction.  Recommend afterload increasing agents like phenylephrine for blood pressure support.  4.  Known history of CAD with history of LAD PCI with mild coronary disease and mild in-stent restenosis on coronary angiogram in 2015.  5.  Dysphagia to solid food for the last 3 months with very poor p.o. intake with 50 pounds of weight loss.  GI evaluation is pending.  6.  Tobacco abuse.    Recommendations  Continue aspirin, heparin  If needed for blood pressure support would recommend vasopressin or phenylephrine as this would increase the afterload and decrease the risk of further LVOT obstruction in the setting of possible stress cardiomyopathy.  Agree with GI evaluation for dysphagia  Recommend adding Lipitor 40 mg daily  As blood pressure improves can consider adding other  cardiomyopathy medication like beta-blocker or ACE inhibitor  Coronary angiogram with possible revascularization  Continue made NPO.    High complexity     Jorge Bassett MD, MD    Primary Care Physician   [facility]    Reason for Consult   Reason for consult: I was asked by Dr Murrell to evaluate this patient for shortness of breath chest pain EKG changes.    History of Present Illness   Sera Figueroa is a 78 year old female who presents with feeling shortness of breath for last month or so.  She has longstanding history of chronic chest pain.  She is also noticed dysphagia to solid food for past 3 months with 50 pounds of weight loss.  She has history of CAD with history of LAD PCI more than 20 years ago with coronary angiogram in 2015 showing mild in-stent restenosis of the LAD stent with mild coronary disease elsewhere.  She also has history of tobacco abuse history of gastric bypass surgery.  She has been hypotensive and had received IV fluids.  EKG shows sinus rhythm with the lateral precordial T wave inversions new compared to previous EKG.  High sensitive troponin elevated to 114, 106.  NT-proBNP elevated 25,000 161.  Hemoglobin normal.  Platelets normal.  Denies any melena or bright blood per rectum.  My prelim review of echo as it was being done showed severe mid to distal LV wall hypokinesia with base artie the best with some evidence of LVOT obstruction on color flow.  Overall LVEF appears severely reduced.  Last echo in 2013 showed LVEF of 65 to 70%.    Past Medical History   Past Medical History:   Diagnosis Date     Abdominal wall hernia      Adenomatous polyp of colon 01/01/2009     CAD (coronary artery disease)     2 stents, last angiogram 11/11 negative     Chronic kidney disease      Degenerative disk disease      WILLIAM (generalized anxiety disorder)      Hyperlipidemia      Major depression      Nephrolithiasis      Osteoporosis          Past Surgical History   Past Surgical  History:   Procedure Laterality Date     APPENDECTOMY       ARTHROSCOPY KNEE Right      CHOLECYSTECTOMY       Coronary angiogram with stents x2       HERNIA REPAIR       Jejunal bypass, taken down, gastric banding - Later removed  1975       Prior to Admission Medications   Prior to Admission Medications   Prescriptions Last Dose Informant Patient Reported? Taking?   DULoxetine (CYMBALTA) 60 MG capsule   No No   Sig: Take 1 capsule (60 mg) by mouth 2 times daily   aspirin EC 81 MG EC tablet   No No   Sig: Take 1 tablet (81 mg) by mouth daily   atorvastatin (LIPITOR) 20 MG tablet   No No   Sig: Take 1 tablet (20 mg) by mouth daily   calcium-vitamin D (CALTRATE) 600-400 MG-UNIT per tablet  Self Yes No   Sig: Take 1 tablet by mouth daily   cephALEXin (KEFLEX) 500 MG capsule   No No   Sig: Take 1 capsule (500 mg) by mouth 4 times daily   hydrOXYzine (VISTARIL) 50 MG capsule   No No   Sig: Take 1 capsule (50 mg) by mouth every 6 hours as needed for anxiety   levothyroxine (SYNTHROID/LEVOTHROID) 75 MCG tablet   No No   Sig: Take 1 tablet (75 mcg) by mouth daily   miconazole (MICATIN) 2 % external powder   No No   Sig: Apply topically 2 times daily   pantoprazole (PROTONIX) 40 MG EC tablet   No No   Sig: Take 1 tablet (40 mg) by mouth daily   traZODone (DESYREL) 100 MG tablet   No No   Sig: Take 1 tablet (100 mg) by mouth nightly as needed for sleep      Facility-Administered Medications: None     Current Facility-Administered Medications   Medication Dose Route Frequency     aspirin  81 mg Oral Daily     DULoxetine  60 mg Oral BID     levothyroxine  75 mcg Oral Daily     pantoprazole  40 mg Oral Daily     sodium chloride (PF)  3 mL Intracatheter Q8H     Current Facility-Administered Medications   Medication Last Rate     - MEDICATION INSTRUCTIONS -       heparin 650 Units/hr (06/15/23 0056)     - MEDICATION INSTRUCTIONS -       - MEDICATION INSTRUCTIONS -       ACE/ARB/ARNI NOT PRESCRIBED       BETA BLOCKER NOT PRESCRIBED        STATIN NOT PRESCRIBED       Allergies   Allergies   Allergen Reactions     Monosodium Glutamate      Morphine Hcl Rash     Nicotine Polacrilex [Nicotine] Rash     Gets a rash from nicotine patch. Pt is a smoker.        Social History    reports that she has been smoking cigarettes. She has been smoking an average of 1 pack per day. She has quit using smokeless tobacco. She reports current alcohol use. She reports that she does not use drugs.      Family History   I have reviewed this patient's family history and updated it with pertinent information if needed.  Family History   Problem Relation Age of Onset     Diabetes Mother      Heart Disease Father           Review of Systems   A comprehensive review of system was performed and is negative other than that noted in the HPI or here.     Physical Exam   Vital Signs with Ranges  Temp:  [96.9  F (36.1  C)-97.4  F (36.3  C)] 97.4  F (36.3  C)  Pulse:  [77-95] 77  Resp:  [11-26] 11  BP: ()/(43-78) 69/43  SpO2:  [92 %-100 %] 97 %  Wt Readings from Last 4 Encounters:   06/14/23 52.2 kg (115 lb)   09/30/21 60.1 kg (132 lb 9.6 oz)   09/22/21 59 kg (130 lb)   10/03/16 84.5 kg (186 lb 4.8 oz)     No intake/output data recorded.      Vitals: BP (!) 69/43 (BP Location: Left arm)   Pulse 77   Temp 97.4  F (36.3  C) (Oral)   Resp 11   Wt 52.2 kg (115 lb)   SpO2 97%   BMI 22.46 kg/m      Physical Exam:   General - Alert and oriented to time place and person in no acute distress  Eyes - No scleral icterus  HEENT - Neck supple, moist mucous membranes  Cardiovascular -S1-S2 normal no murmur rub or gallop  Extremities - There is no pitting pedal edema  Respiratory -clear to auscultation, no rhonchi or crackles  Skin - No pallor or cyanosis  Gastrointestinal - Non tender and non distended without rebound or guarding  Psych - Appropriate affect   Neurological - No gross motor neurological focal deficits    No lab results found in last 7 days.    Invalid input(s):  TROPONINIES    Recent Labs   Lab 06/15/23  0728 06/14/23 2233   WBC 6.7 7.4   HGB 11.4* 12.2   MCV 91 90    306    136   POTASSIUM 4.0 4.5   CHLORIDE 110* 102   CO2 18* 23   BUN 16.4 22.0   CR 0.52 0.59   GFRESTIMATED >90 >90   ANIONGAP 11 11   SUKUMAR 8.3* 9.0   * 93   ALBUMIN  --  3.4*   PROTTOTAL  --  5.8*   BILITOTAL  --  0.8   ALKPHOS  --  69   ALT  --  11   AST  --  21     Recent Labs   Lab Test 09/23/21  0802 06/26/15  1106   CHOL 207* 185   HDL 41* 38*   * 124   TRIG 142 114   CHOLHDLRATIO  --  4.9     Recent Labs   Lab 06/15/23  0728 06/14/23 2233   WBC 6.7 7.4   HGB 11.4* 12.2   HCT 35.4 37.4   MCV 91 90    306     Recent Labs   Lab 06/14/23 2233   PHV 7.45*   PO2V 23*   PCO2V 36*   HCO3V 25     Recent Labs   Lab 06/14/23 2233   NTBNPI 25,161*     Recent Labs   Lab 06/14/23  2345   DD <0.27     No results for input(s): SED, CRP in the last 168 hours.  Recent Labs   Lab 06/15/23  0728 06/14/23 2233    306     No results for input(s): TSH in the last 168 hours.  No results for input(s): COLOR, APPEARANCE, URINEGLC, URINEBILI, URINEKETONE, SG, UBLD, URINEPH, PROTEIN, UROBILINOGEN, NITRITE, LEUKEST, RBCU, WBCU in the last 168 hours.    Imaging:  Recent Results (from the past 48 hour(s))   XR Chest 2 Views    Narrative    EXAM: XR CHEST 2 VIEWS  LOCATION: [facility]  DATE: 6/15/2023    INDICATION: 3 weeks shortness of breath without cough.  possible CHF.  COMPARISON: 02/14/2023      Impression    IMPRESSION:        Heart size within normal limits. Mild pulmonary vascular congestion. Hyperinflation versus deep inspiration changes. Clinical correlation for air trapping. No focal lung infiltrates. Osseous structures grossly intact. Visualized upper abdomen   unremarkable.        Echo:  No results found for this or any previous visit (from the past 4320 hour(s)).    Clinically Significant Risk Factors Present on Admission          # Hypocalcemia:  Lowest Ca = 8.3 mg/dL in last 2 days, will monitor and replace as appropriate     # Hypoalbuminemia: Lowest albumin = 3.4 g/dL at 6/14/2023 10:33 PM, will monitor as appropriate   # Drug Induced Platelet Defect: home medication list includes an antiplatelet medication

## 2023-06-15 NOTE — PROGRESS NOTES
ECHO in progress. Will stop by later for GI consult.     Kimberlyn Costello CNP   [facility] ([facility])  [phone]

## 2023-06-15 NOTE — ED PROVIDER NOTES
History     Chief Complaint:  Shortness of breath    The history is provided by the patient.      Sera Figueroa is a 78 year old female with history of CAD, hyperlipidemia, depression who presents with shortness of breath. She has not eaten much for months and has mainly drank water and milk. She will occasionally eat banana or pudding but will get stuck in the esophagus due to her damaging her espoghuas in the past. She is a former smoker and quit several weeks ago. She has lost over 55 pounds in the last 3 months and has been struggling to walk for the past 2 days. She also gets lightheaded when she gets up and has been having a tendency to fall. She wears a diaper but states she has been able to urinate. Today she had a frozen malt and a bite out of a banana with milk and water.     Independent Historian:   None - Patient Only    Review of External Notes:         Medications:    aspirin EC 81 MG EC tablet  atorvastatin (LIPITOR) 20 MG tablet  calcium-vitamin D (CALTRATE) 600-400 MG-UNIT per tablet  cephALEXin (KEFLEX) 500 MG capsule  DULoxetine (CYMBALTA) 60 MG capsule  hydrOXYzine (VISTARIL) 50 MG capsule  levothyroxine (SYNTHROID/LEVOTHROID) 75 MCG tablet  miconazole (MICATIN) 2 % external powder  pantoprazole (PROTONIX) 40 MG EC tablet  traZODone (DESYREL) 100 MG tablet        Past Medical History:    Past Medical History:   Diagnosis Date     Abdominal wall hernia      Adenomatous polyp of colon 01/01/2009     CAD (coronary artery disease)      Chronic kidney disease      Degenerative disk disease      WILLIAM (generalized anxiety disorder)      Hyperlipidemia      Major depression      Nephrolithiasis      Osteoporosis        Past Surgical History:    Past Surgical History:   Procedure Laterality Date     APPENDECTOMY       ARTHROSCOPY KNEE Right      CHOLECYSTECTOMY       Coronary angiogram with stents x2       HERNIA REPAIR       Jejunal bypass, taken down, gastric banding - Later removed  1975         Physical Exam     Patient Vitals for the past 24 hrs:   BP Temp Temp src Pulse Resp SpO2 Weight   06/14/23 2340 95/52 -- -- 82 -- -- --   06/14/23 2203 (!) 80/59 -- -- -- -- 97 % --   06/14/23 2148 (!) 80/64 -- -- 77 -- 100 % --   06/14/23 2146 -- -- -- -- -- -- 52.2 kg (115 lb)   06/14/23 2144 (!) 75/60 96.9  F (36.1  C) Temporal 79 26 97 % --        Physical Exam  Vitals reviewed.   HENT:      Head: Normocephalic.      Mouth/Throat:      Mouth: Mucous membranes are moist.   Eyes:      Pupils: Pupils are equal, round, and reactive to light.   Cardiovascular:      Rate and Rhythm: Normal rate and regular rhythm.   Pulmonary:      Effort: Pulmonary effort is normal.      Breath sounds: Normal breath sounds.   Abdominal:      General: Bowel sounds are normal.      Palpations: Abdomen is soft.   Musculoskeletal:         General: Normal range of motion.   Skin:     General: Skin is warm.      Capillary Refill: Capillary refill takes less than 2 seconds.   Neurological:      General: No focal deficit present.      Mental Status: She is alert and oriented to person, place, and time.           Emergency Department Course   ECG    ECG results from 06/14/23   EKG 12 lead     Value    Systolic Blood Pressure     Diastolic Blood Pressure     Ventricular Rate 78    Atrial Rate 78    KY Interval 146    QRS Duration 96        QTc 506    P Axis     R AXIS 132    T Axis 260    Interpretation ECG      Sinus rhythm  Low voltage QRS  Left posterior fascicular block  T wave abnormality, consider anterolateral ischemia  Abnormal ECG  When compared with ECG of 22-SEP-2021 03:42,  Left posterior fascicular block is now Present  Nonspecific T wave abnormality now evident in Inferior leads  T wave inversion now evident in Anterolateral leads  QT has lengthened           Laboratory:  Labs Ordered and Resulted from Time of ED Arrival to Time of ED Departure   COMPREHENSIVE METABOLIC PANEL - Abnormal       Result Value    Sodium 136       Potassium 4.5      Chloride 102      Carbon Dioxide (CO2) 23      Anion Gap 11      Urea Nitrogen 22.0      Creatinine 0.59      Calcium 9.0      Glucose 93      Alkaline Phosphatase 69      AST 21      ALT 11      Protein Total 5.8 (*)     Albumin 3.4 (*)     Bilirubin Total 0.8      GFR Estimate >90     TROPONIN T, HIGH SENSITIVITY - Abnormal    Troponin T, High Sensitivity 114 (*)    NT PROBNP INPATIENT - Abnormal    N terminal Pro BNP Inpatient 25,161 (*)    BLOOD GAS VENOUS - Abnormal    pH Venous 7.45 (*)     pCO2 Venous 36 (*)     pO2 Venous 23 (*)     Bicarbonate Venous 25      Base Excess/Deficit (+/-) 1.5      FIO2 21     CBC WITH PLATELETS AND DIFFERENTIAL - Abnormal    WBC Count 7.4      RBC Count 4.15      Hemoglobin 12.2      Hematocrit 37.4      MCV 90      MCH 29.4      MCHC 32.6      RDW 17.7 (*)     Platelet Count 306      % Neutrophils 53      % Lymphocytes 33      % Monocytes 11      % Eosinophils 1      % Basophils 1      % Immature Granulocytes 1      NRBCs per 100 WBC 0      Absolute Neutrophils 4.0      Absolute Lymphocytes 2.4      Absolute Monocytes 0.8      Absolute Eosinophils 0.0      Absolute Basophils 0.1      Absolute Immature Granulocytes 0.0      Absolute NRBCs 0.0     LACTIC ACID WHOLE BLOOD - Normal    Lactic Acid 1.8     D DIMER QUANTITATIVE - Normal    D-Dimer Quantitative <0.27     CBC WITH PLATELETS        Emergency Department Course & Assessments:    PSS-3    Date and Time Over the past 2 weeks have you felt down, depressed, or hopeless? Over the past 2 weeks have you had thoughts of killing yourself? Have you ever attempted to kill yourself? When did this last happen? User   06/14/23 0448 yes no yes -- NAW                Item Assessment   Suicidal Ideation None   Plan    Intent    Suicidal or self-harm behaviors noner   Risk Factors Hopelessness   Protective Factors no plan           Interventions:  Medications   0.9% sodium chloride BOLUS (0 mLs Intravenous Stopped  6/14/23 2346)     Followed by   sodium chloride 0.9% infusion ( Intravenous $New Bag 6/14/23 2346)   aspirin (ASA) EC tablet 325 mg (has no administration in time range)   heparin loading dose for LOW INTENSITY TREATMENT * Give BEFORE starting heparin infusion (has no administration in time range)   heparin infusion 25,000 units in D5W 250 mL ANTICOAGULANT (has no administration in time range)        Assessments:  2230 I examined the patient and obtained the history above    Independent Interpretation (X-rays, CTs, rhythm strip):  None    Consultations/Discussion of Management or Tests:  ED Course as of 06/15/23 0021   Wed Jun 14, 2023 2346 I rechecked the patient    Thu Ben 15, 2023   0014 I spoke with Dr. Murrell, hospitalist, and will admit the patient        Social Determinants of Health affecting care:   None    Disposition:  The patient was admitted to the hospital under the care of Dr. Murrell.     Impression & Plan    CMS Diagnoses: None    Medical Decision Making:      Diagnosis:    ICD-10-CM    1. NSTEMI (non-ST elevated myocardial infarction) (H)  I21.4       2. Dysphagia, unspecified type  R13.10       3. Hypotension due to hypovolemia  I95.89     E86.1            Discharge Medications:  New Prescriptions    No medications on file          Scribe Disclosure:  I, ARMEN, am serving as a scribe at 10:20 PM on 6/14/2023 to document services personally performed by Darinel Ulloa MD based on my observations and the provider's statements to me.   6/14/2023   Darinel Ulloa MD Goodman, Brian Samuel, MD  06/19/23 7796

## 2023-06-15 NOTE — PLAN OF CARE
Pt alert and oriented x4. Pt complains of some pain, managed with norco, given x1. Pt NPO since midnight. Hep drip infusing at 800 units/hr. Able to make needs known. Plan for angio this afternoon. Awaiting bed upstairs.

## 2023-06-15 NOTE — PROGRESS NOTES
[facility]    Hospitalist Progress Note    Date of Service (when I saw the patient): 06/15/2023  Provider:  Christiano Giron MD   Text Page  7am - 6PM       Assessment & Plan   Sera Figueroa is a 78 year old female with PMH including CAD s/p LAD stent, obesity s/p gastric bypass surgery, gastric ulcer, large ventral hernia, MDD, HLD, anxiety, and 50-year smoking history who quit several weeks ago who presents with shortness of breath and dysphagia.    Assessment and Plan:   Stress CM ( second time in her life).  Patient report of being under terrible stress in the last 12-18 months, lost of many family members including her , sister and daughter.  Coronary angiogram proves a stress cardiomyopathy. LV EF 30-35 %  Severely elevated LV end-diastolic blood pressure contributing to HFrEF   Chest pain, dyspnea secondary to the above  NSTEMI secondary to the above   Orthostatic hypotension secondary to the above  History of CAD.  Stent patency, not significant arthrosclerotic disease    Presenting with 3 weeks of shortness of breath that is worse with exertion associate with lightheadedness when getting up and intermittent chest tightness both at rest and with exertion.  She does not have any lower extremity edema.  No previous history of heart failure although she has had 2 stents placed in the past including into the LAD.  Most recent angiogram was from 2013 and shows some in-stent stenosis and other moderate coronary disease.  NT proBNP is elevated at 25,000 and her troponin T is elevated at 114.  D-dimer undetectable.  EKG shows NSR with significant T wave inversions in leads V3-V6 that is new.  Blood pressure was 75/60 in the ER that responded to 1 L of NS and is 95/52.  She does take an 81 mg daily aspirin.  Suspect she has had worsening of her coronary disease resulting in angina symptoms and now NSTEMI with elevated troponin.  Thinks hypotension is from hypovolemia secondary to her  dysphagia as below.  -Cardiology consulted, recommendation appreciated.  -TTE done, results available at the walls and other .  -Serial troponin T level completed  -Cardiac monitoring  -Cardiac diet  -Continue PTA 81 mg aspirin (received 325 mg aspirin)  -Heparin drip  -Lipid panel completed  -No beta-blocker or ACE inhibitor due to hypotension.  Cardiology is recommending vasopressin or phenylephrine if needed       Tobacco dependence  Suspected COPD: She reports she is supposed to have COPD testing next week which I suspect is PFTs.  She smoked for 50 years and quit several weeks ago.  No wheezing on exam now.  Has had shortness of breath for the past 3 weeks as above.     Dysphagia  Unintentional weight loss  History gastric bypass  History gastric ulcer: Months of dysphagia more with solids than liquids.  Food feels like he gets stuck in her esophagus and she has a lot of dry heaving regurgitation after this.  Denies any significant heartburn symptoms.  Has had previous gastric ulcer and takes daily 40 mg Protonix.  Has also had previous gastric bypass surgery.  Has lost 50 pounds over the last 3 months due to decreased appetite and swallowing issues.  My main concern would be for possible esophageal cancer given her extensive smoking history and description of her symptoms.  She is actually scheduled for EGD less than 2 weeks from now.  Denies any melena or hematochezia.  -Given she may need to be on dual antiplatelet therapy if she has a stent placed I have consulted [facility] for EGD consideration while here.  Additionally she is hypotensive from not getting enough fluids orally.  -Consult SLP  -If endoscopy is unrevealing she may need further imaging of the neck with video swallow study versus CT soft tissue versus thyroid ultrasound given her hypothyroidism  -Consult dietitian  -Continue 40 mg daily Protonix     Tobacco dependence  Suspected COPD: She reports she is supposed to have COPD testing next  week, suspect is PFTs.  She smoked for 50 years and quit several weeks ago.  No wheezing on exam now.  Has had shortness of breath for the past 3 weeks as above.     MDD  Anxiety: Resume PTA duloxetine.     Chronic pain syndrome: She is on Norco 5 mg tablets 3 times a day as needed for chronic pain.  She may also be on gabapentin, awaiting formal med rec.  -Resume Norco 5 mg tablets at every 6 hours as needed  -Awaiting med rec, resume gabapentin if taking     Hypothyroidism: Resume PTA levothyroxine 75 mcg daily.       DVT Prophylaxis: PCD  Code Status: Full Code    Disposition: Expected discharge in 2-3 days once EGD done and work up completed    Interval History   Patient is back from cardiac lab.  She underwent coronary angiogram without complication.  No chest pain at the moment.  Mild shortness of breath at rest which has been present for weeks now    -Data reviewed today: I reviewed all new labs and imaging results over the last 24 hours. I personally reviewed the EKG tracing showing sinus rhythm .    Physical Exam   Temp: 98  F (36.7  C) Temp src: Oral BP: (Abnormal) 84/52 Pulse: 96   Resp: 20 SpO2: 97 % O2 Device: None (Room air) Oxygen Delivery: 3 LPM  Vitals:    06/14/23 2146   Weight: 52.2 kg (115 lb)     Vital Signs with Ranges  Temp:  [96.9  F (36.1  C)-98  F (36.7  C)] 98  F (36.7  C)  Pulse:  [77-96] 96  Resp:  [11-26] 20  BP: ()/(43-95) 84/52  SpO2:  [91 %-100 %] 97 %  I/O last 3 completed shifts:  In: 240 [P.O.:240]  Out: -     GEN:  Alert, oriented x 3, appears comfortable, NAD.  HEENT:  Normocephalic/atraumatic, no scleral icterus, no nasal discharge, mouth moist.  CV:  Regular rate and rhythm, ENDY ii/vi LSB. No  JVD.  S1 + S2 noted, no S3 or S4.  LUNGS:  Clear to auscultation bilaterally without rales/rhonchi/wheezing/retractions.  Symmetric chest rise on inhalation noted.  ABD:  Active bowel sounds, soft, non-tender/non-distended.  No rebound/guarding/rigidity.  EXT:  No edema or  cyanosis.  No joint synovitis noted.  SKIN:  Dry to touch, no exanthems noted in the visualized areas.       Medications     - MEDICATION INSTRUCTIONS -       - MEDICATION INSTRUCTIONS -       - MEDICATION INSTRUCTIONS -       ACE/ARB/ARNI NOT PRESCRIBED       BETA BLOCKER NOT PRESCRIBED       STATIN NOT PRESCRIBED       sodium chloride 75 mL/hr (06/15/23 1453)       aspirin  81 mg Oral Daily     atorvastatin  40 mg Oral QPM     DULoxetine  60 mg Oral BID     levothyroxine  75 mcg Oral Daily     pantoprazole  40 mg Oral Daily     sodium chloride (PF)  3 mL Intracatheter Q8H       Data   Recent Labs   Lab 06/15/23  0728 23  2233   WBC 6.7 7.4   HGB 11.4* 12.2   MCV 91 90    306    136   POTASSIUM 4.0 4.5   CHLORIDE 110* 102   CO2 18* 23   BUN 16.4 22.0   CR 0.52 0.59   ANIONGAP 11 11   SUKUMAR 8.3* 9.0   * 93   ALBUMIN  --  3.4*   PROTTOTAL  --  5.8*   BILITOTAL  --  0.8   ALKPHOS  --  69   ALT  --  11   AST  --  21       Recent Results (from the past 24 hour(s))   XR Chest 2 Views    Narrative    EXAM: XR CHEST 2 VIEWS  LOCATION: [facility]  DATE: 6/15/2023    INDICATION: 3 weeks shortness of breath without cough.  possible CHF.  COMPARISON: 2023      Impression    IMPRESSION:        Heart size within normal limits. Mild pulmonary vascular congestion. Hyperinflation versus deep inspiration changes. Clinical correlation for air trapping. No focal lung infiltrates. Osseous structures grossly intact. Visualized upper abdomen   unremarkable.    Echocardiogram Complete   Result Value    LVEF  30-35%    Narrative    [id number]  [id number]  [id number]^MARCELO^EVY^[facility]  Echocardiography Laboratory  [address] [place] [address]     Name: GREGORY PERDOMO  MRN: [id number]  : 1945  Study Date: 06/15/2023 08:33 AM  Age: 78 yrs  Gender: Female  Patient Location: [facility]  Reason For Study: Chest Pain, Chest Tightness, Chest  "Pressure  Ordering Physician: EVY ROBERTSON  Performed By: Meron Garcia     BSA: 1.5 m2  Height: 60 in  Weight: 115 lb  HR: 81  BP: 76/50 mmHg  ______________________________________________________________________________  Procedure  Complete Portable Echo Adult. Optison (NDC #3251-6018) given intravenously.  ______________________________________________________________________________  Interpretation Summary     The visual ejection fraction is 30-35%.  Left ventricular systolic function is moderately reduced.  Only the basal portions of the left ventricle contract normally with the  remaining segments being severely hypokinetic to akinetic. Takotsubo stress  cardiomyopathy would have an appearance similar to this and dynamic outflow  tract obstruction can occur with this condition due to the hyperdynamic base.  There is mild to moderate (1-2+) mitral regurgitation.  There is systolic anterior motion of the mitral valve.  There is mild to moderate (1-2+) tricuspid regurgitation.  There is trace aortic regurgitation.  Trivial pericardial effusion  Mild aortic root dilatation.  There has been a significant drop in LV systolic function since 2013. The  study was technically difficult.  ______________________________________________________________________________  Left Ventricle  The left ventricle is normal in size. There is normal left ventricular wall  thickness. A sigmoid septum is present. Diastolic Doppler findings (E/E' ratio  and/or other parameters) suggest left ventricular filling pressures are  increased. The visual ejection fraction is 30-35%. Left ventricular systolic  function is moderately reduced. A severe left ventricular outflow tract (LVOT)  obstruction is present. The resting dynamic LVOT gradient is 105 mmHg. The  recorded Valsalva gradient is erroneous and does not have the \"dagger\" shape  of an dynamic outflow signal and most likely represents mitral regurgitation  signal. Only the " basal portions of the left ventricle contract normally with  the remaiong segments being severely hypokinetic to akinetic. Takotsubo stress  cardiomyopathy would have an appearance similar to this and dynamic outflow  tract obstruction can occur with this condition due to the hyperdynamic base.     Right Ventricle  The right ventricle is normal in size and function.     Atria  The left atrium is moderately dilated. Right atrial size is normal. Lipomatous  hypertrophy of the interatrial septum is noted. There is no color Doppler  evidence of an atrial shunt.     Mitral Valve  There is mild mitral annular calcification. The mitral valve leaflets are  mildly thickened. There is systolic anterior motion of the mitral valve. The  mitral valve chordae are thickened and/or calcified. There is mild to moderate  (1-2+) mitral regurgitation. The mitral regurgitant jet is posteriorly  directed, which is consistent with anterior leaflet pathology.     Tricuspid Valve  There is mild to moderate (1-2+) tricuspid regurgitation. The right  ventricular systolic pressure is approximated at 24.7 mmHg plus the right  atrial pressure. IVC diameter >2.1 cm collapsing <50% with sniff suggests a  high RA pressure estimated at 15 mmHg or greater. Right ventricular systolic  pressure is elevated, consistent with mild to moderate pulmonary hypertension.     Aortic Valve  The aortic valve is not well visualized. Thickened aortic valve leaflets.  There is trace aortic regurgitation. The peak AoV pressure gradient is 12.0  mmHg. The mean AoV pressure gradient is 7.0 mmHg. No hemodynamically  significant valvular aortic stenosis.     Pulmonic Valve  The pulmonic valve is not well visualized. Normal pulmonic valve velocity.     Vessels  Mild aortic root dilatation. IVC diameter >2.1 cm collapsing <50% with sniff  suggests a high RA pressure estimated at 15 mmHg or greater.     Pericardium  Trivial pericardial effusion.     Rhythm  Sinus rhythm was  noted.  ______________________________________________________________________________  MMode/2D Measurements & Calculations     IVSd: 0.95 cm  LVIDd: 4.0 cm  LVIDs: 3.2 cm  LVPWd: 0.89 cm  IVC diam: 2.3 cm  FS: 20.8 %  LV mass(C)d: 114.9 grams  LV mass(C)dI: 77.8 grams/m2  Ao root diam: 3.8 cm  LVOT diam: 2.4 cm  LVOT area: 4.5 cm2  LA Volume (BP): 64.5 ml  LA Volume Index (BP): 43.6 ml/m2  RV Base: 2.9 cm  RWT: 0.44     TAPSE: 1.5 cm     Doppler Measurements & Calculations  MV E max sharath: 85.2 cm/sec  MV A max sharath: 116.0 cm/sec  MV E/A: 0.73  MV max P.0 mmHg  MV mean P.0 mmHg  MV V2 VTI: 23.5 cm  MV P1/2t max sharath: 87.3 cm/sec  MV P1/2t: 61.3 msec  MVA(P1/2t): 3.6 cm2  MV dec slope: 417.0 cm/sec2  MV dec time: 0.21 sec  Ao V2 max: 175.0 cm/sec  Ao max P.0 mmHg  Ao V2 mean: 120.0 cm/sec  Ao mean P.0 mmHg  Ao V2 VTI: 31.2 cm  AI P1/2t: 225.1 msec  MR PISA: 4.0 cm2  MR ERO: 0.28 cm2  MR volume: 46.2 ml  PA V2 max: 109.0 cm/sec  PA max P.8 mmHg  PA mean P.0 mmHg  PA V2 VTI: 19.8 cm  PA acc time: 0.16 sec  TR max sharath: 248.5 cm/sec  TR max P.7 mmHg  E/E' av.0  Lateral E/e': 14.8  Medial E/e': 21.1  RV S Sharath: 12.8 cm/sec     ______________________________________________________________________________  Report approved by: Shelbi Barry 06/15/2023 11:29 AM         Cardiac Catheterization   Result Value    Cath EF Estimated 35    Narrative    Proximal-mid LAD stent is patent with 40-50% ISR.  Compared to the report   of the 2013 angiogram, this is probably not substantially changed.  Mild non-obstructive CAD elsewhere  Severely elevated left-sided filling pressures (LVEDP 29 mmHg)  LV gram shows basal hyperkinesis, with mid-apical hypokinesis/akinesis,   suggestive of Takotsubo (stress-induced) cardiomyopathy.           Securely message with the Vocera Web Console (learn more here)  Text page via [facility] Paging/Directory        Disclaimer: This note consists of symbols derived from  keyboarding, dictation and/or voice recognition software. As a result, there may be errors in the script that have gone undetected. Please consider this when interpreting information found in this chart.

## 2023-06-15 NOTE — CONSULTS
GASTROENTEROLOGY CONSULTATION      Sera Figueroa  [id number]   [facility] [address]  78 year old female     Admission Date/Time: 6/14/2023  Primary Care Provider: [place] [place]     We were asked to see the patient in consultation by Dr. Murrell for evaluation of dysphagia and weight loss.     HPI:  Sera Figueroa is a 78 year old female who presented to ED for an  evaluation of SOB with exertion associate with lightheadedness when getting up and intermittent chest tightness both at rest and with exertion.PMH notable for CAD with stents, gastric bypass, gastric ulcer, COPD, major depression, anxiety, tobacco use, chronic pain syndrome, hypothyroidism. GI consulted for dysphagia and unintentional weight loss of 50 lbs.     Per chart review, patient had dysphagia for a month. Solid food dysphagia associated with dry heaving and regurgitation. History of gastric ulcer, currently on Protonix 40 mg daily. Has lost 50 Lbs in the past 3 months due to the above issue.     Admission labs notable for elevated troponin. Cardiology consulted. Coronary angiogram is planned.     Patient is hypotensive.  hgb 11.4, other blood counts wnl. EGD was requested to rule out PUD, esophagitis or narrowing and malignancy.      Patient reports that she can only tolerate milk and some liquid. She is pointing to the throat as the location of dysphagia. No pain with swallowing. No acid reflux or heart burn. She experiences intermittent dry heaving when she smells food. She had bowel movements every 10 days. No black or bloody stools. She had right sided abdominal pain, she states that the pain is from multiple hernia in her abdomen.  Had gastric bypass 20 plus years ago. Has lost 50 lbs within in the last 3 months.     CT AP completed in [place] 2023 showed Diastases of the anterior abdominal wall with large broad-based ventral hernia along the lower abdomen and pelvis. No evidence of obstruction. Postsurgical changes  are noted to the stomach.S/P loi with mild intra hepatic biliary duct dilation.      Reported had EGD and colon in [place] several years ago. I do not have the reports available for review.        PAST MEDICAL HISTORY:  Patient Active Problem List    Diagnosis Date Noted     NSTEMI (non-ST elevated myocardial infarction) (H) 06/15/2023     Priority: Medium     Dysphagia, unspecified type 06/15/2023     Priority: Medium     Hypotension due to hypovolemia 06/15/2023     Priority: Medium     Suicide attempt (H) 09/22/2021     Priority: Medium     Depression with suicidal ideation 10/03/2016     Priority: Medium     Overdose 10/02/2016     Priority: Medium     Acquired hypothyroidism 07/05/2016     Priority: Medium     Controlled substance agreement signed 04/08/2016     Priority: Medium     DDD (degenerative disc disease), lumbar 11/30/2015     Priority: Medium     Chronic pain syndrome 11/30/2015     Priority: Medium     STEMI (ST elevation myocardial infarction) (HCC) 08/09/2013     Priority: Medium     GERD (gastroesophageal reflux disease) 04/29/2013     Priority: Medium     Hyperlipidemia LDL goal <70 02/10/2013     Priority: Medium     Coronary artery disease involving native coronary artery of native heart with angina pectoris (H)      Priority: Medium     2 stents, last angiogram 11/11 negative       Major depression, recurrent (H)      Priority: Medium     WILLIAM (generalized anxiety disorder)      Priority: Medium     Osteoporosis      Priority: Medium     Problem list name updated by automated process. Provider to review            ROS: A comprehensive ten point review of systems was negative aside from those in mentioned in the HPI.       MEDICATIONS:   Prior to Admission medications    Medication Sig Start Date End Date Taking? Authorizing Provider   aspirin EC 81 MG EC tablet Take 1 tablet (81 mg) by mouth daily 8/12/13  Yes Roly Martinez, DO   atorvastatin (LIPITOR) 20 MG tablet Take 1 tablet (20  mg) by mouth daily 10/2/21  Yes Shalini Simmons APRN CNP   diphenhydrAMINE (BENADRYL) 25 MG tablet Take 25 mg by mouth At Bedtime   Yes Unknown, Entered By History   docusate sodium (COLACE) 100 MG capsule Take 100 mg by mouth as needed for constipation   Yes Unknown, Entered By History   DULoxetine (CYMBALTA) 60 MG capsule Take 1 capsule (60 mg) by mouth 2 times daily 10/2/21  Yes Shalini Simmons APRN CNP   HYDROcodone-acetaminophen (NORCO) 5-325 MG tablet Take 1 tablet by mouth 3 times daily   Yes Unknown, Entered By History   hydrOXYzine (VISTARIL) 50 MG capsule Take 1 capsule (50 mg) by mouth every 6 hours as needed for anxiety 10/2/21  Yes Shalini Simmons APRN CNP   levothyroxine (SYNTHROID/LEVOTHROID) 75 MCG tablet Take 1 tablet (75 mcg) by mouth daily 10/2/21  Yes Shalini Simmons APRN CNP   pantoprazole (PROTONIX) 40 MG EC tablet Take 1 tablet (40 mg) by mouth daily 10/2/21  Yes Shalini Simmons APRN CNP   traZODone (DESYREL) 100 MG tablet Take 1 tablet (100 mg) by mouth nightly as needed for sleep 10/1/21  Yes Avery Mcdermott MD   nitroGLYcerin (NITROSTAT) 0.4 MG sublingual tablet Place 0.4 mg under the tongue every 5 minutes as needed for chest pain For chest pain place 1 tablet under the tongue every 5 minutes for 3 doses. If symptoms persist 5 minutes after 1st dose call 911.    Unknown, Entered By History        ALLERGIES:   Allergies   Allergen Reactions     Monosodium Glutamate      Morphine Hcl Rash     Nicotine Polacrilex [Nicotine] Rash     Gets a rash from nicotine patch. Pt is a smoker.         SOCIAL HISTORY:  Social History     Tobacco Use     Smoking status: Some Days     Packs/day: 1.00     Types: Cigarettes     Last attempt to quit: 2015     Years since quittin.7     Smokeless tobacco: Former   Substance Use Topics     Alcohol use: Yes     Comment: occassionally     Drug use: No        FAMILY HISTORY:  Family History   Problem Relation Age  of Onset     Diabetes Mother      Heart Disease Father         PHYSICAL EXAM:   BP (!) 84/52   Pulse 80   Temp 98  F (36.7  C) (Oral)   Resp 20   Ht 1.524 m (5')   Wt 52.2 kg (115 lb)   SpO2 97%   BMI 22.46 kg/m       General: alert, oriented, NAD  SKIN: no suspicious lesions, rashes, jaundice, or spider angiomas  EYES: No scleral icterus  RESPIRATORY: Non labored breathing, Lungs clear  CARDIOVASCULAR:  RRR. No murmurs, clicks gallops or rub  GASTROINTESTINAL: multiple hernias, surgical scar vertically, lumpy abdomen  JOINT/EXTREMITIES: extremities normal- no gross deformities noted. No edema.   NEURO: Grossly WNL.  PSYCH: no abnormal anxiety/depression     LABS:  I reviewed the patient's new clinical lab test results.   Recent Labs   Lab Test 06/15/23  0728 06/14/23  2233 11/22/21  1743   WBC 6.7 7.4 6.1   HGB 11.4* 12.2 11.8   MCV 91 90 91    306 250     Recent Labs   Lab Test 06/15/23  0728 06/14/23 2233 11/22/21  1743    136 142   POTASSIUM 4.0 4.5 4.2   CHLORIDE 110* 102 112*   CO2 18* 23 24   BUN 16.4 22.0 23   ANIONGAP 11 11 6   SUKUMAR 8.3* 9.0 8.7     Recent Labs   Lab Test 06/14/23 2233 11/22/21  1931 11/22/21  1743 09/22/21  0419 10/04/16  0908   ALBUMIN 3.4*  --  3.3* 3.4  --    BILITOTAL 0.8  --  0.4 0.3  --    ALT 11  --  21 16  --    AST 21  --  17 10  --    ALKPHOS 69  --  127 158*  --    PROTEIN  --  10*  --   --  10*   LIPASE  --   --  82  --   --         IMAGING/PROCEDURES:    I personally reviewed the patient's new imaging results.    Problem list pertaining to GI:  History of gastric ulcer  Gastric bypass  Dysphagia  Weight loss    Assessment: This is a  78 y-o female who admitted for SOB with exertion noted to have elevated troponin. Cardiology consulted. Coronary angiogram is planned. GI consulted for an evaluation of chronic solid food dysphagia, regurgitation, and significant weight loss. She EGD and colon in [place] several years ago. I do not have the reports available  for review.  CT from March 2023 showed Diastases of the anterior abdominal wall with large broad-based ventral hernia along the lower abdomen and pelvis. No evidence of obstruction. Postsurgical changes are noted to the stomach.    Differential considered are esophageal narrowing, EOE,  malignancy, PUD. Also recommended out patient colon given the significant weight loss. Patient declined colon.     Echo showed decreased LV function. Coronary angio was clear by nursing staff. I did not see the actual report. Bp is improving.  Heparin was discontinued.     Plan:  - EGD tomorrow  - NPO after MN  - Continue PPI  - If EGD is normal then consider video swallow to oropharyngeal dysphagia     I will discuss with Dr. Mckeon     Thank you for allowing me to participate in the care of this patient.  Please contact me with any questions or concerns.    I spent 50 minutes providing patient care, including patient evaluation, reviewing documentation/test results, and . Thank you for asking us to participate in the care of this patient.      Kimberlyn Costello, CNP   [facility] ([facility])  [phone]

## 2023-06-15 NOTE — CONSULTS
Care Management Initial Consult    General Information  Assessment completed with: Patient, Patient  Type of CM/SW Visit: Initial Assessment    Primary Care Provider verified and updated as needed:     Readmission within the last 30 days: no previous admission in last 30 days      Reason for Consult: discharge planning  Advance Care Planning:            Communication Assessment  Patient's communication style: spoken language (English or Bilingual)             Cognitive  Cognitive/Neuro/Behavioral: WDL                      Living Environment:   People in home: alone     Current living Arrangements: apartment      Able to return to prior arrangements: yes       Family/Social Support:  Care provided by: self  Provides care for: pet(s)  Marital Status:   Children, Other (specify) (Neighbors)          Description of Support System: Supportive, Involved         Current Resources:   Patient receiving home care services: No     Community Resources: None  Equipment currently used at home:    Supplies currently used at home:      Employment/Financial:  Employment Status: retired        Financial Concerns: No concerns identified   Referral to Financial Worker: No       Does the patient's insurance plan have a 3 day qualifying hospital stay waiver?  No    Lifestyle & Psychosocial Needs:  Social Determinants of Health     Tobacco Use: High Risk (9/23/2021)    Patient History      Smoking Tobacco Use: Some Days      Smokeless Tobacco Use: Former      Passive Exposure: Not on file   Alcohol Use: Not on file   Financial Resource Strain: Not on file   Food Insecurity: Not on file   Transportation Needs: Not on file   Physical Activity: Not on file   Stress: Not on file   Social Connections: Not on file   Intimate Partner Violence: Not on file   Depression: Not at risk (1/10/2019)    PHQ-2      PHQ-2 Score: 0   Housing Stability: Not on file       Functional Status:  Prior to admission patient needed assistance:   Dependent  ADLs::  (Scooter)  Dependent IADLs:: Independent, Transportation  Assesssment of Functional Status: Not at baseline with ADL Functioning, Not at baseline with mobility    Mental Health Status:  Mental Health Status: Current Concern  Mental Health Management: Medication    Chemical Dependency Status:  Chemical Dependency Status: No Current Concerns             Values/Beliefs:  Spiritual, Cultural Beliefs, Jain Practices, Values that affect care:                 Additional Information:  SW met with patient at bedside while boarding in the ED.     Patient lives at [facility] in [facility]. She does not get any services. She is unable to walk distances so she uses a scooter. Patient inquired about getting a new scooter or w/c. SW explained that patient should go to PCP for a prescription and explained insurance coverage.     Patient is a . She get social security, half of her  social security, and a pension from the [employer] for wid[employer]. Patient was not in the . Patient noted she thinks  may cover some home making services. Patient would like a referral to community living services. SW made referral to [facility]. SW explained it may take a few weeks for the [facility] to get back to her. Patient noted she gets rides through Uber or a friend that she pays. Patient states she has been so weak that she has not been able to clean so she has  2 weeks worth a dirty dishes that she is embarrassed by. She is having trouble cleaning.     Patient has a son and daughter in law in [place]. Patient has a son in [place]. Patient's sister recently  and patient's daughter  this past [place] morning. Patient feels lonely. She is on depression medication.     Patient has a dog that her neighbor is watching.     Patient did not have any emergency contacts. She gave permission for SW to add son and daughter in law. Updated PCP on chart.     SW will continue to follow for discharge planning.     Corin  LAVONNE Tracy, LGSW  Emergency Room   Please contact the SW on the floor in which the patient is staying for any questions or concerns

## 2023-06-15 NOTE — PLAN OF CARE
Patient Transfer Information  Patient connected to monitoring equipment on arrival: yes Vital signs monitor continuous pulse oximetryt     Patient connected to wall oxygen on arrival: N/A    Belongings: Transferred with patient    Safety check completed: Yes     BP 84/50 - MAP 69. CMS + RUE/hand. Finger temperature bilateral hands cool/equal, weak radial pulse, TR band intact. 2ml air removed upon arrival - total in band 1445 6ml.     TR band removed 1700. BPs continue to be low 80's -90's systolic. Pt asymptomatic this evening. Tele NSR 80's. Up to commode to void large amount.

## 2023-06-15 NOTE — PLAN OF CARE
[facility]    ED Boarding Nurse Handoff Addendum Report:    Date/time: 6/15/2023, 5:14 AM    Activity Level: assist of 1    Fall Risk: No    Active Infusions: Heparin infusing @ 650units/hr    Current Meds Due: NA    Current care needs: Hep Xa    Oxygen requirements (liters/min and/or FiO2): RA    Respiratory status: Room air    Vital signs (within last 30 minutes):    Vitals:    06/15/23 0146 06/15/23 0147 06/15/23 0148 06/15/23 0426   BP:    (!) 72/55   BP Location:    Left arm   Pulse:    80   Resp:    20   Temp:    97.1  F (36.2  C)   TempSrc:    Temporal   SpO2: 98% 94% 96% 93%   Weight:           Focused assessment within last 30 minutes:    Alert and oriented x4, BP soft, bolus given. Pain managed with Norco, and benadryl for sleeping. Heparin infusing @ 650units/hr Xa pending collection. IND @ baseline, but assist of 1 will be appropriate. Able to make needs known.    ED Boarding Nurse name: Debbi Rodriguez RN

## 2023-06-15 NOTE — PROGRESS NOTES
SLP consult received. The patient is currently NPO for an angio, and is then awaiting GI workup as dysphagia symptoms appear related to GI vs being oropharyngeal.     SLP continues to follow and will initiate evaluation as indicated.

## 2023-06-15 NOTE — H&P
[facility]  Hospitalist Admission Note  Name: Sera Figueroa    MRN: [id number]  YOB: 1945    Age: 78 year old  Date of admission: 6/14/2023  Primary care provider: [facility], [facility]    Chief Complaint: Shortness of breath, dysphagia    Assessment and Plan:   Chest pain, dyspnea  NSTEMI  CAD  Orthostatic hypotension: Presenting with 3 weeks of shortness of breath that is worse with exertion associate with lightheadedness when getting up and intermittent chest tightness both at rest and with exertion.  She does not have any lower extremity edema.  No previous history of heart failure although she has had 2 stents placed in the past including into the LAD.  Most recent angiogram was from 2013 and shows some in-stent stenosis and other moderate coronary disease.  NT proBNP is elevated at 25,000 and her troponin T is elevated at 114.  D-dimer undetectable.  EKG shows NSR with significant T wave inversions in leads V3-V6 that is new.  Blood pressure was 75/60 in the ER that responded to 1 L of NS and is 95/52.  She does take an 81 mg daily aspirin.  Suspect she has had worsening of her coronary disease resulting in angina symptoms and now NSTEMI with elevated troponin.  Thinks hypotension is from hypovolemia secondary to her dysphagia as below.  -Consult cardiology  -Obtain TTE  -Serial troponin T level  -Cardiac monitoring  -Keep n.p.o. for possible coronary angiogram today  -Continue PTA 81 mg aspirin (received 325 mg aspirin)  -Heparin drip  -Lipid panel  -No beta-blocker or ACE inhibitor due to hypotension  -SBP in the 90s, give additional 500 mL NS over 5 hours  -Obtain 2 view chest x-ray given shortness of breath    Tobacco dependence  Suspected COPD: She reports she is supposed to have COPD testing next week which I suspect is PFTs.  She smoked for 50 years and quit several weeks ago.  No wheezing on exam now.  Has had shortness of breath for the past 3 weeks as  above.    Dysphagia  Unintentional weight loss  History gastric bypass  History gastric ulcer: Months of dysphagia more with solids than liquids.  Food feels like he gets stuck in her esophagus and she has a lot of dry heaving regurgitation after this.  Denies any significant heartburn symptoms.  Has had previous gastric ulcer and takes daily 40 mg Protonix.  Has also had previous gastric bypass surgery.  Has lost 50 pounds over the last 3 months due to decreased appetite and swallowing issues.  My main concern would be for possible esophageal cancer given her extensive smoking history and description of her symptoms.  She is actually scheduled for EGD less than 2 weeks from now.  Denies any melena or hematochezia.  -Given she may need to be on dual antiplatelet therapy if she has a stent placed I have consulted [facility] for EGD consideration while here.  Additionally she is hypotensive from not getting enough fluids orally.  -Consult SLP  -If endoscopy is unrevealing she may need further imaging of the neck with video swallow study versus CT soft tissue versus thyroid ultrasound given her hypothyroidism  -Consult dietitian  -Continue 40 mg daily Protonix    Tobacco dependence  Suspected COPD: She reports she is supposed to have COPD testing next week which I suspect is PFTs.  She smoked for 50 years and quit several weeks ago.  No wheezing on exam now.  Has had shortness of breath for the past 3 weeks as above.    MDD  Anxiety: Resume PTA duloxetine.    Chronic pain syndrome: She is on Norco 5 mg tablets 3 times a day as needed for chronic pain.  She may also be on gabapentin, awaiting formal med rec.  -Resume Norco 5 mg tablets at every 6 hours as needed  -Awaiting med rec, resume gabapentin if taking    Hypothyroidism: Resume PTA levothyroxine 75 mcg daily.    DVT Prophylaxis: Heparin drip  Code Status: Full Code  FEN: N.p.o. for possible procedures  Discharge Dispo: Consult PT and social work due to weakness  today  Estimated Disch Date / # of Days until Disch: Admit inpatient for NSTEMI.  Anticipate at least 2 night hospitalization.      History of Present Illness:  Sera Figueroa is a 78 year old female with PMH including CAD s/p LAD stent, obesity s/p gastric bypass surgery, gastric ulcer, large ventral hernia, MDD, HLD, anxiety, and 50-year smoking history who quit several weeks ago who presents with shortness of breath and dysphagia.  She has been working with her primary care doctor and gastroenterology to figure out why for the past 3 months she has had decreased appetite and significant dysphagia resulting in a 50 pound weight loss.  Food does not taste very good and even has a metallic taste.  When she does swallow solids it feels like  the food gets stuck in her esophagus and she will often dry heaves or regurgitate after that.  Liquids seem to go down easier and she reports drinking a lot of water and milk.  Additionally for the past few weeks she has had shortness of breath that is worse with exertion.  When she stands up she feels lightheaded and improves when she sits back down.  For the last 2 weeks or so she has had intermittent chest tightness that is both at rest and with exertion.  She has avoided getting evaluated for shortness of breath and chest tightness due to not having someone to take care of her pet.  She denies any fevers, cough, or leg swelling.  She does have intermittent chills although this is not new.  She reports that she is due to have a EGD and also testing for COPD within the next 2 weeks.    History obtained from patient, medical record, and from Dr. Ulloa in the emergency department.  Blood pressure initially 75/60 then up to 95/52 after 1 L NS in the ER.  Heart rate 77, temperature 96.9  F, oxygen 97% on room air.  CBC is within normal limits.  Lactic acid normal at 1.8.  CMP within normal limits.  Troponin T elevated 114 as well as an NT proBNP at 25,161.  EKG showed NSR with T  wave inversions in V3-V6.  Concern for NSTEMI so she was given a full-strength aspirin and started on heparin drip.  She will be admitted as an inpatient with cardiology consultation.       Clinically Significant Risk Factors Present on Admission               # Hypoalbuminemia: Lowest albumin = 3.4 g/dL at 2023 10:33 PM, will monitor as appropriate   # Drug Induced Platelet Defect: home medication list includes an antiplatelet medication                           Past Medical History reviewed:  Past Medical History:   Diagnosis Date     Abdominal wall hernia      Adenomatous polyp of colon 2009     CAD (coronary artery disease)     2 stents, last angiogram  negative     Chronic kidney disease      Degenerative disk disease      WILLIAM (generalized anxiety disorder)      Hyperlipidemia      Major depression      Nephrolithiasis      Osteoporosis      Past Surgical History reviewed:  Past Surgical History:   Procedure Laterality Date     APPENDECTOMY       ARTHROSCOPY KNEE Right      CHOLECYSTECTOMY       Coronary angiogram with stents x2       HERNIA REPAIR       Jejunal bypass, taken down, gastric banding - Later removed       Social History reviewed:  Social History     Tobacco Use     Smoking status: Some Days     Packs/day: 1.00     Types: Cigarettes     Last attempt to quit: 2015     Years since quittin.7     Smokeless tobacco: Former   Vaping Use     Vaping status: Not on file   Substance Use Topics     Alcohol use: Yes     Comment: occassionally     Social History     Social History Narrative     Not on file     Family History reviewed:  Family History   Problem Relation Age of Onset     Diabetes Mother      Heart Disease Father      Allergies:  Allergies   Allergen Reactions     Monosodium Glutamate      Morphine Hcl Rash     Nicotine Polacrilex [Nicotine] Rash     Gets a rash from nicotine patch. Pt is a smoker.      Medications:  Prior to Admission medications    Medication Sig  Last Dose Taking? Auth Provider Long Term End Date   aspirin EC 81 MG EC tablet Take 1 tablet (81 mg) by mouth daily   Roly Martinez DO     atorvastatin (LIPITOR) 20 MG tablet Take 1 tablet (20 mg) by mouth daily   Shalini Simmons APRN CNP Yes    calcium-vitamin D (CALTRATE) 600-400 MG-UNIT per tablet Take 1 tablet by mouth daily   Unknown, Entered By History     cephALEXin (KEFLEX) 500 MG capsule Take 1 capsule (500 mg) by mouth 4 times daily   Eric Story MD     DULoxetine (CYMBALTA) 60 MG capsule Take 1 capsule (60 mg) by mouth 2 times daily   Shalini Simmons APRN CNP Yes    hydrOXYzine (VISTARIL) 50 MG capsule Take 1 capsule (50 mg) by mouth every 6 hours as needed for anxiety   Shalini Simmons APRN CNP     levothyroxine (SYNTHROID/LEVOTHROID) 75 MCG tablet Take 1 tablet (75 mcg) by mouth daily   Shalini Simmons APRN CNP Yes    miconazole (MICATIN) 2 % external powder Apply topically 2 times daily   Avery Mcdermott MD     pantoprazole (PROTONIX) 40 MG EC tablet Take 1 tablet (40 mg) by mouth daily   Shalini Simmons APRN CNP     traZODone (DESYREL) 100 MG tablet Take 1 tablet (100 mg) by mouth nightly as needed for sleep   Avery Mcdermott MD Yes      Review of Systems:  A Comprehensive greater than 10 system review of systems was carried out.  Pertinent positives and negatives are noted above.  Otherwise negative.     Physical Exam:  Blood pressure 95/52, pulse 82, temperature 96.9  F (36.1  C), temperature source Temporal, resp. rate 26, weight 52.2 kg (115 lb), SpO2 97 %.  Wt Readings from Last 1 Encounters:   06/14/23 52.2 kg (115 lb)     Exam:  Constitutional: Awake, NAD   Eyes: sclera white   HEENT: atraumatic, MMM  Respiratory: no respiratory distress, lungs cta bilaterally, no crackles or wheeze  Cardiovascular: RRR.  No murmur   GI: non-tender, not distended, bowel sounds present  Skin: no rash or lesions,  acyanotic  Musculoskeletal/extremities: atraumatic, no major deformities. No edema  Neurologic: A&O, speech clear  Psychiatric: calm, cooperative    Lab and imaging data personally reviewed:  Labs:  Recent Labs   Lab 06/14/23 2233   PHV 7.45*   PO2V 23*   PCO2V 36*   HCO3V 25     Recent Labs   Lab 06/14/23 2233   WBC 7.4   HGB 12.2   HCT 37.4   MCV 90        Recent Labs   Lab 06/14/23 2233      POTASSIUM 4.5   CHLORIDE 102   CO2 23   ANIONGAP 11   GLC 93   BUN 22.0   CR 0.59   GFRESTIMATED >90   SUKUMAR 9.0   PROTTOTAL 5.8*   ALBUMIN 3.4*   BILITOTAL 0.8   ALKPHOS 69   AST 21   ALT 11     Recent Labs   Lab 06/14/23 2233   NTBNPI 25,161*     Recent Labs   Lab 06/14/23 2233   LACT 1.8     Troponin T 114 with repeat 106    EKG: NSR, deep T wave inversions in leads V3-V6    Imaging:  No imaging obtained    Phill Murrell MD  Hospitalist  [facility]

## 2023-06-15 NOTE — ED NOTES
[facility]  ED Nurse Handoff Report    ED Chief complaint: Shortness of Breath  . ED Diagnosis:   Final diagnoses:   NSTEMI (non-ST elevated myocardial infarction) (H)   Dysphagia, unspecified type   Hypotension due to hypovolemia       Allergies:   Allergies   Allergen Reactions    Monosodium Glutamate     Morphine Hcl Rash    Nicotine Polacrilex [Nicotine] Rash     Gets a rash from nicotine patch. Pt is a smoker.        Code Status: DNR / DNI    Activity level - Baseline/Home:  independent.  Activity Level - Current:   standby.   Lift room needed: No.   Bariatric: No   Needed: No   Isolation: No.   Infection: Not Applicable.     Respiratory status: Room air    Vital Signs (within 30 minutes):   Vitals:    06/14/23 2146 06/14/23 2148 06/14/23 2203 06/14/23 2340   BP:  (!) 80/64 (!) 80/59 95/52   Pulse:  77  82   Resp:       Temp:       TempSrc:       SpO2:  100% 97%    Weight: 52.2 kg (115 lb)          Cardiac Rhythm:  ,      Pain level:    Patient confused: No.   Patient Falls Risk: nonskid shoes/slippers when out of bed, patient and family education, and assistive device/personal items within reach.   Elimination Status:  Has not voided yet in the ER      Patient Report - Initial Complaint: Shortness of breath.   Focused Assessment: Sera Figueroa is a 78 year old female with history of CAD, hyperlipidemia, depression who presents with shortness of breath. She has not eaten much for months and has mainly drank water and milk. She will occasionally eat banana or pudding but will get stuck in the esophagus due to her damaging her espoghuas in the past. She is a former smoker and quit several weeks ago. She has lost over 55 pounds in the last 3 months and has been struggling to walk for the past 2 days. She also gets lightheaded when she gets up and has been having a tendency to fall. She wears a diaper but states she has been able to urinate. Today she had a frozen malt and a bite  out of a banana with milk and water.      Abnormal Results:   Labs Ordered and Resulted from Time of ED Arrival to Time of ED Departure   COMPREHENSIVE METABOLIC PANEL - Abnormal       Result Value    Sodium 136      Potassium 4.5      Chloride 102      Carbon Dioxide (CO2) 23      Anion Gap 11      Urea Nitrogen 22.0      Creatinine 0.59      Calcium 9.0      Glucose 93      Alkaline Phosphatase 69      AST 21      ALT 11      Protein Total 5.8 (*)     Albumin 3.4 (*)     Bilirubin Total 0.8      GFR Estimate >90     TROPONIN T, HIGH SENSITIVITY - Abnormal    Troponin T, High Sensitivity 114 (*)    NT PROBNP INPATIENT - Abnormal    N terminal Pro BNP Inpatient 25,161 (*)    BLOOD GAS VENOUS - Abnormal    pH Venous 7.45 (*)     pCO2 Venous 36 (*)     pO2 Venous 23 (*)     Bicarbonate Venous 25      Base Excess/Deficit (+/-) 1.5      FIO2 21     CBC WITH PLATELETS AND DIFFERENTIAL - Abnormal    WBC Count 7.4      RBC Count 4.15      Hemoglobin 12.2      Hematocrit 37.4      MCV 90      MCH 29.4      MCHC 32.6      RDW 17.7 (*)     Platelet Count 306      % Neutrophils 53      % Lymphocytes 33      % Monocytes 11      % Eosinophils 1      % Basophils 1      % Immature Granulocytes 1      NRBCs per 100 WBC 0      Absolute Neutrophils 4.0      Absolute Lymphocytes 2.4      Absolute Monocytes 0.8      Absolute Eosinophils 0.0      Absolute Basophils 0.1      Absolute Immature Granulocytes 0.0      Absolute NRBCs 0.0     LACTIC ACID WHOLE BLOOD - Normal    Lactic Acid 1.8     D DIMER QUANTITATIVE - Normal    D-Dimer Quantitative <0.27     CBC WITH PLATELETS        No orders to display       Treatments provided: see MAR  Family Comments: N/a  OBS brochure/video discussed/provided to patient:  N/A  ED Medications:   Medications   0.9% sodium chloride BOLUS (0 mLs Intravenous Stopped 6/14/23 2346)     Followed by   sodium chloride 0.9% infusion ( Intravenous $New Bag 6/14/23 2346)   heparin infusion 25,000 units in D5W 250  mL ANTICOAGULANT (650 Units/hr Intravenous $New Bag 6/15/23 0056)   aspirin (ASA) EC tablet 325 mg (325 mg Oral $Given 6/15/23 0055)   heparin loading dose for LOW INTENSITY TREATMENT * Give BEFORE starting heparin infusion (3,150 Units Intravenous $Given 6/15/23 0055)       Drips infusing:  Yes  For the majority of the shift this patient was Green.   Interventions performed were N/a.    Sepsis treatment initiated: No    Cares/treatment/interventions/medications to be completed following ED care:     ED Nurse Name: Leisa Chavez RN  12:59 AM

## 2023-06-16 NOTE — PROGRESS NOTES
06/16/23 0959   Appointment Info   Signing Clinician's Name / Credentials (PT) Lelia Peng DPT   Living Environment   People in Home alone   Current Living Arrangements independent living facility   Home Accessibility no concerns   Transportation Anticipated family or friend will provide   Living Environment Comments Pt lives in ILF. Walk in shower with bench and grab bars.   Self-Care   Usual Activity Tolerance moderate   Current Activity Tolerance moderate   Regular Exercise No   Equipment Currently Used at Home walker, rolling;other (see comments);cane, straight  (scooter)   Fall history within last six months yes   Number of times patient has fallen within last six months 5   Activity/Exercise/Self-Care Comment IND with ADLs. Does own meals or uses dining gil. Pt has been using electric scooter to take her dog out. Uses 4WW in apartment, but not all the time, furniture walks. Has been ordering groceries and food delivery.   General Information   Onset of Illness/Injury or Date of Surgery 06/14/23   Referring Physician Phill Murrell MD   Patient/Family Therapy Goals Statement (PT) return home   Pertinent History of Current Problem (include personal factors and/or comorbidities that impact the POC) Sera Figueroa is a 78 year old female with PMH including CAD s/p LAD stent, obesity s/p gastric bypass surgery, gastric ulcer, large ventral hernia, MDD, HLD, anxiety, and 50-year smoking history who quit several weeks ago who presents with shortness of breath and dysphagia.   Existing Precautions/Restrictions fall;cardiac   Cognition   Affect/Mental Status (Cognition) WNL   Orientation Status (Cognition) oriented x 4   Follows Commands (Cognition) WNL   Pain Assessment   Patient Currently in Pain Yes, see Vital Sign flowsheet   Integumentary/Edema   Integumentary/Edema Comments age related changes   Range of Motion (ROM)   ROM Comment BLEs WFL   Strength (Manual Muscle Testing)   Strength Comments  able to SLR BLEs, unable to assess in WB d/t hypotension   Bed Mobility   Comment, (Bed Mobility) unable to assess d/t hypotension   Transfers   Comment, (Transfers) unable to assess d/t hypotension   Gait/Stairs (Locomotion)   Comment, (Gait/Stairs) unable to assess d/t hypotension   Balance   Balance Comments unable to assess d/t hypotension   Clinical Impression   Criteria for Skilled Therapeutic Intervention Yes, treatment indicated   PT Diagnosis (PT) impaired functional mobility   Influenced by the following impairments weakness, hypotension, impaired balance, deconditioning   Functional limitations due to impairments difficulty with bed mobility, transfers, ambulation   Clinical Presentation (PT Evaluation Complexity) Evolving/Changing   Clinical Presentation Rationale clinical judgement   Clinical Decision Making (Complexity) moderate complexity   Planned Therapy Interventions (PT) balance training;bed mobility training;gait training;home exercise program;neuromuscular re-education;patient/family education;strengthening;transfer training;wheelchair management/propulsion training;progressive activity/exercise;risk factor education;home program guidelines   Risk & Benefits of therapy have been explained evaluation/treatment results reviewed;care plan/treatment goals reviewed;risks/benefits reviewed;current/potential barriers reviewed;participants voiced agreement with care plan;participants included;patient   PT Total Evaluation Time   PT Eval, Moderate Complexity Minutes ([id number]) 10   Physical Therapy Goals   PT Frequency Daily   PT Predicted Duration/Target Date for Goal Attainment 06/23/23   PT Goals Bed Mobility;Transfers;Gait   PT: Bed Mobility Modified independent;Supine to/from sit   PT: Transfers Modified independent;Sit to/from stand;Assistive device;Within precautions   PT: Gait Modified independent;Assistive device;Within precautions;150 feet   PT Discharge Planning   PT Plan PT: progress all  mobility as able, monitor vitals   PT Discharge Recommendation (DC Rec) Transitional Care Facility   PT Rationale for DC Rec Patient is significantly below baseline functional mobility. Pt lives alone in ILF and is IND with mobility using 4WW and furniture walking in apartment and electric scooter outside of apartment. Pt with significant hypotension this date, 76/48, RN alerted and present. Unable to progress mobility OOB d/t vitals, but pt with weakness and deconditioning. Recommend TCU for progression of strength and IND with mobility.   PT Brief overview of current status hypotensive, not safe for OOB mobility at this time   Total Session Time   Total Session Time (sum of timed and untimed services) 10

## 2023-06-16 NOTE — PROGRESS NOTES
[facility]    Cardiology Progress Note    Date of Admission: 06/14/2023  Service Date: 06/16/2023    Summary:  Ms. Sera Figueroa is a very pleasant 78 year old female with a past medical history of CAD s/p LAD stent, obesity s/p gastric bypass surgery, gastric ulcer, large ventral hernia, MDD, HLD, anxiety, and 50-year smoking history who quit several weeks. She was admitted on 6/14/2023 after presenting with shortness of breath and dysphagia.    Interval History   Patient is resting comfortably. She reports mild dyspnea when getting up in the room. Blood pressure has been low this morning. She notes mild lightheadedness and dizziness. No chest pain currently. We reviewed her angiogram findings and the plan of care as outlined below. She reports a lot of stress in her life over the past 6 months with the loss of her daughter on [place] this past winter, plus the recent issues with dysphagia and weight loss.    Telemetry: NSR    Assessment:  1.  Suspected Takotsubo (stress-induced) cardiomyopathy  - Presents with elevated troponin, EKG changes, some intermittent chest discomfort, and TTE showing mid to distal LV wall severe hypokinesia with base artie the best overall severely reduced LV systolic function with EF 30-35%.    - Coronary angiogram 6/15/23 showing proximal-mid LAD stent is patent with 40-50% ISR which compared to the report of the 2013 angiogram is probably not substantially changed. Mild non-obstructive CAD noted elsewhere. Findings suggestive of Takotsubo (stress-induced) cardiomyopathy.    2.  Hypotension   - Likely secondary to combination of decreased oral intake and also underlying cardiomyopathy with possible LVOT obstruction.      3.  Known history of CAD with history of LAD PCI with mild coronary disease and mild in-stent restenosis on coronary angiogram in 2013.    4.  Dysphagia to solid food for the last 3 months with very poor p.o. intake with 50 pounds of  weight loss.    - GI following.    5.  Longstanding tobacco abuse.     Plan:   1. Discussed case with Dr. Bassett. Will start midodrine 2.5 mg TID blood pressure support.  2. Check daily weights, strict I/Os, low sodium diet, and close monitoring of renal function and electrolytes.  3. Unable to initiate any GDMT for the cardiomyopathy currently in the setting of hypotension. Can revisit if blood pressure improves and would repeat an echocardiogram in around 1 month.   4. Cardiology will continue to follow along.     Thank you for the opportunity to participate in this pleasant patient's care.     VALERIE Murguia, CNP   Nurse Practitioner  [facility]  Pager: [phone]  Text Page or securely message via [employer] (8am - 5pm, M-F)    Patient Active Problem List   Diagnosis     Hyperlipidemia LDL goal <70     Coronary artery disease involving native coronary artery of native heart with angina pectoris (H)     Major depression, recurrent (H)     WILLIAM (generalized anxiety disorder)     Osteoporosis     GERD (gastroesophageal reflux disease)     STEMI (ST elevation myocardial infarction) (HCC)     DDD (degenerative disc disease), lumbar     Chronic pain syndrome     Controlled substance agreement signed     Acquired hypothyroidism     Overdose     Depression with suicidal ideation     Suicide attempt (H)     NSTEMI (non-ST elevated myocardial infarction) (H)     Dysphagia, unspecified type     Hypotension due to hypovolemia     Physical Exam   Temp: 98  F (36.7  C) Temp src: Oral BP: 99/53 Pulse: 84   Resp: 18 SpO2: 96 % O2 Device: None (Room air) Oxygen Delivery: 3 LPM  Vitals:    06/14/23 2146 06/16/23 0559   Weight: 52.2 kg (115 lb) 54.8 kg (120 lb 14.4 oz)     Vital Signs with Ranges  Temp:  [96.9  F (36.1  C)-98  F (36.7  C)] 98  F (36.7  C)  Pulse:  [77-96] 84  Resp:  [15-20] 18  BP: ()/(49-95) 99/53  SpO2:  [91 %-98 %] 96 %  I/O last 3 completed shifts:  In: 1024 [P.O.:720; I.V.:304]  Out: -      General: Appears her stated age, well nourished, and in no acute distress.  Eyes: No scleral icterus.  Cardiovascular: Regular rate and rhythm, normal S1 and S2. No murmur, rub, or gallop.  Extremities: Moves all extremities well and symmetrically. There is no LE edema bilaterally.  Respiratory: Breathing non-labored. Lungs clear to auscultation with no crackles or wheezes bilaterally.  Skin: No pallor or cyanosis.  Psych: Appropriate affect. Mentation normal.  Neurological: No gross motor neurological focal deficits.    Medications     - MEDICATION INSTRUCTIONS -       - MEDICATION INSTRUCTIONS -       - MEDICATION INSTRUCTIONS -       ACE/ARB/ARNI NOT PRESCRIBED       BETA BLOCKER NOT PRESCRIBED       STATIN NOT PRESCRIBED         aspirin  81 mg Oral Daily     atorvastatin  40 mg Oral QPM     DULoxetine  60 mg Oral BID     levothyroxine  75 mcg Oral Daily     pantoprazole  40 mg Oral Daily     sodium chloride (PF)  3 mL Intracatheter Q8H     Data   Recent Labs   Lab 06/15/23  2215 06/15/23  0728 06/14/23  2233   WBC  --  6.7 7.4   HGB  --  11.4* 12.2   MCV  --  91 90   PLT  --  258 306   NA  --  139 136   POTASSIUM  --  4.0 4.5   CHLORIDE  --  110* 102   CO2  --  18* 23   BUN  --  16.4 22.0   CR  --  0.52 0.59   ANIONGAP  --  11 11   SUKUMAR  --  8.3* 9.0   GLC 88 104* 93   ALBUMIN  --   --  3.4*   PROTTOTAL  --   --  5.8*   BILITOTAL  --   --  0.8   ALKPHOS  --   --  69   ALT  --   --  11   AST  --   --  21     This note was completed in part using Dragon voice recognition software. Although reviewed after completion, some word and grammatical errors may occur.

## 2023-06-16 NOTE — PRE-PROCEDURE
Pre-Endoscopy History and Physical     Sera Figueroa MRN# [id number]   YOB: 1945 Age: 78 year old     Date of Procedure: 6/14/2023  Primary care provider: Ricardo Alonzo  Type of Endoscopy: esophagogastroduodenoscopy (upper GI endoscopy)  Reason for Procedure: dysphagia, weight loss  Type of Anesthesia Anticipated: Moderate (conscious) sedation    HPI:    Sera is a 78 year old female who will be undergoing the above procedure.      A history and physical has been performed. The patient's medications and allergies have been reviewed. The risks and benefits of the procedure and the sedation options and risks were discussed with the patient.  All questions were answered and informed consent was obtained.      Allergies   Allergen Reactions     Monosodium Glutamate      Morphine Hcl Rash     Nicotine Polacrilex [Nicotine] Rash     Gets a rash from nicotine patch. Pt is a smoker.         Current Facility-Administered Medications   Medication     acetaminophen (TYLENOL) tablet 650 mg    Or     acetaminophen (TYLENOL) Suppository 650 mg     aspirin EC tablet 81 mg     atorvastatin (LIPITOR) tablet 40 mg     Continuing aspirin from home medication list OR daily aspirin order already placed during this visit     diphenhydrAMINE (BENADRYL) capsule 25 mg     DULoxetine (CYMBALTA) DR capsule 60 mg     fentaNYL (PF) (SUBLIMAZE) injection 25 mcg     HOLD:  Metformin and metformin containing medications if patient received IV contrast with acute kidney injury or severe chronic kidney disease (stage IV or stage V; i.e., eGFR less than 30)     HOLD: nitroGLYcerin IF     HYDROcodone-acetaminophen (NORCO) 5-325 MG per tablet 1 tablet     hydrOXYzine (ATARAX) tablet 50 mg     levothyroxine (SYNTHROID/LEVOTHROID) tablet 75 mcg     lidocaine (LMX4) cream     lidocaine (LMX4) cream     lidocaine 1 % 0.1-1 mL     lidocaine 1 % 0.1-1 mL     meclizine (ANTIVERT) tablet 12.5 mg     medication instruction     melatonin  tablet 1 mg     midazolam (VERSED) injection 0.5 mg     midodrine (PROAMATINE) tablet 2.5 mg     ondansetron (ZOFRAN ODT) ODT tab 4 mg    Or     ondansetron (ZOFRAN) injection 4 mg     oxyCODONE (ROXICODONE) tablet 5 mg    Or     oxyCODONE IR (ROXICODONE) tablet 10 mg     pantoprazole (PROTONIX) EC tablet 40 mg     Patient is already receiving anticoagulation with heparin, enoxaparin (LOVENOX), warfarin (COUMADIN)  or other anticoagulant medication     polyethylene glycol (MIRALAX) Packet 17 g     Reason ACE/ARB/ARNI order not selected     Reason beta blocker not prescribed     Reason statin not prescribed     senna-docusate (SENOKOT-S/PERICOLACE) 8.6-50 MG per tablet 1 tablet    Or     senna-docusate (SENOKOT-S/PERICOLACE) 8.6-50 MG per tablet 2 tablet     sodium chloride (PF) 0.9% PF flush 3 mL     sodium chloride (PF) 0.9% PF flush 3 mL     sodium chloride (PF) 0.9% PF flush 3 mL     sodium chloride (PF) 0.9% PF flush 3 mL       Patient Active Problem List   Diagnosis     Hyperlipidemia LDL goal <70     Coronary artery disease involving native coronary artery of native heart with angina pectoris (H)     Major depression, recurrent (H)     WILLIAM (generalized anxiety disorder)     Osteoporosis     GERD (gastroesophageal reflux disease)     STEMI (ST elevation myocardial infarction) (HCC)     DDD (degenerative disc disease), lumbar     Chronic pain syndrome     Controlled substance agreement signed     Acquired hypothyroidism     Overdose     Depression with suicidal ideation     Suicide attempt (H)     NSTEMI (non-ST elevated myocardial infarction) (H)     Dysphagia, unspecified type     Hypotension due to hypovolemia        Past Medical History:   Diagnosis Date     Abdominal wall hernia      Adenomatous polyp of colon 01/01/2009     CAD (coronary artery disease)     2 stents, last angiogram 11/11 negative     Chronic kidney disease      Degenerative disk disease      WILLIAM (generalized anxiety disorder)       Hyperlipidemia      Major depression      Nephrolithiasis      Osteoporosis         Past Surgical History:   Procedure Laterality Date     APPENDECTOMY       ARTHROSCOPY KNEE Right      CHOLECYSTECTOMY       Coronary angiogram with stents x2       HERNIA REPAIR       Jejunal bypass, taken down, gastric banding - Later removed         Social History     Tobacco Use     Smoking status: Some Days     Packs/day: 1.00     Types: Cigarettes     Last attempt to quit: 2015     Years since quittin.7     Smokeless tobacco: Former   Vaping Use     Vaping status: Not on file   Substance Use Topics     Alcohol use: Yes     Comment: occassionally       Family History   Problem Relation Age of Onset     Diabetes Mother      Heart Disease Father             Medications:     Medications Prior to Admission   Medication Sig Dispense Refill Last Dose     aspirin EC 81 MG EC tablet Take 1 tablet (81 mg) by mouth daily 90 tablet 0 2023     atorvastatin (LIPITOR) 20 MG tablet Take 1 tablet (20 mg) by mouth daily 30 tablet 1 2023     diphenhydrAMINE (BENADRYL) 25 MG tablet Take 25 mg by mouth At Bedtime   Past Week     docusate sodium (COLACE) 100 MG capsule Take 100 mg by mouth as needed for constipation    at PRN     DULoxetine (CYMBALTA) 60 MG capsule Take 1 capsule (60 mg) by mouth 2 times daily 60 capsule 1 2023     HYDROcodone-acetaminophen (NORCO) 5-325 MG tablet Take 1 tablet by mouth 3 times daily   Past Week     hydrOXYzine (VISTARIL) 50 MG capsule Take 1 capsule (50 mg) by mouth every 6 hours as needed for anxiety 60 capsule 1 Past Week     levothyroxine (SYNTHROID/LEVOTHROID) 75 MCG tablet Take 1 tablet (75 mcg) by mouth daily 30 tablet 1 2023     pantoprazole (PROTONIX) 40 MG EC tablet Take 1 tablet (40 mg) by mouth daily 30 tablet 1 Past Week     traZODone (DESYREL) 100 MG tablet Take 1 tablet (100 mg) by mouth nightly as needed for sleep 30 tablet 1 Past Week     nitroGLYcerin (NITROSTAT) 0.4  MG sublingual tablet Place 0.4 mg under the tongue every 5 minutes as needed for chest pain For chest pain place 1 tablet under the tongue every 5 minutes for 3 doses. If symptoms persist 5 minutes after 1st dose call 911.    at PRN       Scheduled Medications:    aspirin  81 mg Oral Daily     atorvastatin  40 mg Oral QPM     DULoxetine  60 mg Oral BID     levothyroxine  75 mcg Oral Daily     midodrine  2.5 mg Oral TID w/meals     pantoprazole  40 mg Oral Daily     sodium chloride (PF)  3 mL Intracatheter Q8H     sodium chloride (PF)  3 mL Intracatheter Q8H       PRN:  acetaminophen **OR** acetaminophen, - MEDICATION INSTRUCTIONS -, diphenhydrAMINE, fentaNYL, HOLD MEDICATION, HOLD MEDICATION, HYDROcodone-acetaminophen, hydrOXYzine, lidocaine 4%, lidocaine 4%, lidocaine (buffered or not buffered), lidocaine (buffered or not buffered), meclizine, - MEDICATION INSTRUCTIONS -, melatonin, midazolam, ondansetron **OR** ondansetron, oxyCODONE **OR** oxyCODONE, - MEDICATION INSTRUCTIONS -, polyethylene glycol, ACE/ARB/ARNI NOT PRESCRIBED, BETA BLOCKER NOT PRESCRIBED, STATIN NOT PRESCRIBED, senna-docusate **OR** senna-docusate, sodium chloride (PF), sodium chloride (PF)    PHYSICAL EXAM:   BP 93/61   Pulse 83   Temp 97.5  F (36.4  C) (Oral)   Resp 14   Ht 1.524 m (5')   Wt 54.8 kg (120 lb 14.4 oz)   SpO2 99%   BMI 23.61 kg/m   Estimated body mass index is 23.61 kg/m  as calculated from the following:    Height as of this encounter: 1.524 m (5').    Weight as of this encounter: 54.8 kg (120 lb 14.4 oz).   RESP: lungs clear to auscultation - no rales, rhonchi or wheezes  CV: regular rates and rhythm    IMPRESSION   ASA Class 3 - Severe systemic disease, but not incapacitating      Signed Electronically by: Kevin Mckeon MD  June 16, 2023    .

## 2023-06-16 NOTE — PROGRESS NOTES
EGD shows normal esophagus and typical findings of gastric bypass. Biopsied for EoE. SLP to see. If swallowing continues to be a problem, follow-up in our esophageal clinic. Will sign off.

## 2023-06-16 NOTE — PROGRESS NOTES
Vitals: /58 (BP Location: Left arm)   Pulse 80   Temp 97.5  F (36.4  C) (Oral)   Resp 18   Ht 1.524 m (5')   Wt 54.8 kg (120 lb 14.4 oz)   SpO2 98%   BMI 23.61 kg/m      Neuro: A&Ox4, denies dizziness at this time  Resp: Clear, RA  Cardiac: Soft Bps,   GI: Poor appetite, difficulty eating, experiences gagging fits intermittently but denied discomfort overnight besides one mild episode. Has been eating a full liquid bland diet at home for some time now.   : WDL   Skin: rash anjelica area  M/S: mild generalized weakness  Lines/ Drains: PIV SL  Activity: A1 belt/walker  Nutrition: Full liquid, NPO since 0000  Pain: 5/10-6/10 back and abdominal pain, Gave PRN norco and oxycodone x 1    Expected Discharge: TBD, EGD planned today 6/16

## 2023-06-16 NOTE — PROGRESS NOTES
"   06/16/23 0403   Appointment Info   Signing Clinician's Name / Credentials (SLP) Sandra Faulkner MS CCC-SLP   General Information   Onset of Illness/Injury or Date of Surgery 06/14/23   Referring Physician Kevin Mckeon MD   Patient/Family Therapy Goal Statement (SLP) To eat/drink without difficulty   Pertinent History of Current Problem The pt is a 78 year old female with PMH including CAD s/p LAD stent, obesity s/p gastric bypass surgery, gastric ulcer, large ventral hernia, MDD, HLD, anxiety, and 50-year smoking history who quit several weeks ago who presents with shortness of breath and dysphagia. Per MD note: \"Months of dysphagia more with solids than liquids.  Food feels like he gets stuck in her esophagus and she has a lot of dry heaving regurgitation after this.  Denies any significant heartburn symptoms.  Has had previous gastric ulcer and takes daily 40 mg Protonix.  Has also had previous gastric bypass surgery.  Has lost 50 pounds over the last 3 months due to decreased appetite and swallowing issues.  My main concern would be for possible esophageal cancer given her extensive smoking history and description of her symptoms.  She is actually scheduled for EGD less than 2 weeks from now.  Denies any melena or hematochezia.\" EGD today was unremakable. Per MD Note, \"If endoscopy is unrevealing she may need further imaging of the neck with video swallow study versus CT soft tissue versus thyroid ultrasound given her hypothyroidism\".   General Observations The pt is alert and agreeable to evaluation.   Pain Assessment   Patient Currently in Pain Yes, see Vital Sign flowsheet   Type of Evaluation   Type of Evaluation Swallow Evaluation   Oral Motor   Oral Musculature generally intact   Structural Abnormalities none present   Mucosal Quality dry   Dentition (Oral Motor)   Dentition (Oral Motor) dental appliance/dentures;edentulous   Dental Appliance/Denture (Oral Motor) upper;dentures, full   Facial " "Symmetry (Oral Motor)   Facial Symmetry (Oral Motor) WNL   Lip Function (Oral Motor)   Lip Range of Motion (Oral Motor) WNL   Tongue Function (Oral Motor)   Tongue ROM (Oral Motor) WNL   Jaw Function (Oral Motor)   Jaw Function (Oral Motor) WNL   Cough/Swallow/Gag Reflex (Oral Motor)   Volitional Throat Clear/Cough (Oral Motor) WNL   Volitional Swallow (Oral Motor) WNL   Vocal Quality/Secretion Management (Oral Motor)   Vocal Quality (Oral Motor) WFL   Comment, Vocal Quality/Secretion Management (Oral Motor) Pt endorses slurred speech at times d/t xerostomia   General Swallowing Observations   Past History of Dysphagia Pt with hx of GERD, gastric bypass, and gastric ulcer. She reports following a full liquid diet for 3 months d/t extent of gagging/dry heaving with solids. She reports being able to get cream of wheat down this PM well. She denies choking or aspiration episodes. She denies difficulty with liquids.   Respiratory Support (General Swallowing Observations) none   Current Diet/Method of Nutritional Intake (General Swallowing Observations, NIS) thin liquids (level 0);regular diet   Swallowing Evaluation Clinical swallow evaluation   Clinical Swallow Evaluation   Feeding Assistance no assistance needed   Clinical Swallow Evaluation Textures Trialed thin liquids;pureed   Clinical Swallow Eval: Thin Liquid Texture Trial   Mode of Presentation, Thin Liquids straw;self-fed   Volume of Liquid or Food Presented 3 oz   Oral Phase of Swallow WFL   Pharyngeal Phase of Swallow intact   Diagnostic Statement No s/s of aspiration. Grimacing upon initiation of swallow, however pt reports no pain, rather \"anticipating it to hurt going down or having to dry heave\".   Clinical Swallow Evaluation: Puree Solid Texture Trial   Mode of Presentation, Puree spoon;self-fed   Volume of Puree Presented 3 bites   Oral Phase, Puree WFL   Pharyngeal Phase, Puree intact   Diagnostic Statement No s/s of aspiration. She denied globus " sensation, but declined advanced solids to avoid gagging.   Esophageal Phase of Swallow   Patient reports or presents with symptoms of esophageal dysphagia Yes   Esophageal comments Intermittent belching and dry heaving with PO, not necessarily right after bite/sip.   Swallowing Recommendations   Diet Consistency Recommendations thin liquids (level 0);regular diet  (puree for comfort)   Supervision Level for Intake patient independent   Mode of Delivery Recommendations slow rate of intake   Swallowing Maneuver Recommendations alternate food and liquid intake   Monitoring/Assistance Required (Eating/Swallowing) monitor for cough or change in vocal quality with intake   Recommended Feeding/Eating Techniques (Swallow Eval) encourage use of dentures;maintain upright sitting position for eating;maintain upright posture during/after eating for 30 minutes;provide 6 smaller meals throughout day   Medication Administration Recommendations, Swallowing (SLP) per pt preference   Instrumental Assessment Recommendations VFSS (videofluoroscopic swallowing study)   General Therapy Interventions   Planned Therapy Interventions Dysphagia Treatment   Dysphagia treatment Modified diet education;Instruction of safe swallow strategies;Compensatory strategies for swallowing   Clinical Impression   Criteria for Skilled Therapeutic Interventions Met (SLP Eval) Yes, treatment indicated   SLP Diagnosis Suspect functional oropharyngeal swallow mechanism, would benefit from instrumental assessment to rule out   Risks & Benefits of therapy have been explained evaluation/treatment results reviewed;care plan/treatment goals reviewed;risks/benefits reviewed;current/potential barriers reviewed;participants voiced agreement with care plan;participants included;patient   Clinical Impression Comments Clinical swallow evaluation completed per MD order. The pt presents with suspect functional oropharyngeal swallow mechanism at bedside. Oral mech is  "unremarkable. Pt does have upper dentures and no bottom teeth. She reports that she avoids bread and meats at baseline s/p gastric bypass surgery. She consumed thin liquids and puree solids with no s/s of aspiration. Oral phase WFL. She denied globus sensation or difficulty with swallow itself. She had grimacing upon swallow initiation in anticipation of \"gagging\" or \"food getting stuck\". Intermittent belching and gagging did not appear directly after bite/sip. She declined consecutive sips d/t similar discomfort. She endorsed increased stomach pain and swelling after lunch. EGD unremarkable today with GI follow-up scheduled as OP if no further cause identified for symptoms. Do no suspect oropharyngeal dysphagia, however warrants further evaluation. Per discussion with pt and MD, recommend VFSS to rule out pharyngeal component given severity of discomfort with solids. Over the weekend, recommend puree solids (4) and thin liquids (0) per pt preference, as she is unable to tolerate any solids past puree. From an SLP standpoint, pt is ok to advance diet as tolerated if she desires.   SLP Total Evaluation Time   Eval: oral/pharyngeal swallow function, clinical swallow Minutes (02215) 15   Interventions   Interventions Quick Adds Swallowing Dysfunction   Swallowing Dysfunction &/or Oral Function for Feeding   Treatment of Swallowing Dysfunction &/or Oral Function for Feeding Minutes (35980) 8   Symptoms Noted During/After Treatment None   Treatment Detail/Skilled Intervention Clinical swallow evaluation completed this date. Provided training on oropharyngeal vs esophageal dysphagia, and strategies to promote PO intake given s/s. Discussed smaller more frequent meals, sit upright fully with PO and 30 min after, small bites/sips, alternate food/drink, and slow pace. Discussed that pt can elect for modified diet for comfort as she is only eating liquid/puree items on regular menu. Pt asking for puree diet to increase PO " intake - did change order, however pt can advance as prefered as do not suspect oropharyngeal dysphagia.   SLP Discharge Planning   SLP Plan VFSS Monday to rule out pharyngeal component, f/u on thyroid and head/neck scans over the weekend. GI to follow up as OP if no further concerns   SLP Discharge Recommendation home with outpatient speech therapy;home   SLP Rationale for DC Rec Suspect oropharyngeal swallow function is WFL, pending VFSS results   SLP Brief overview of current status  EGD unremarkable today with GI follow-up scheduled as OP if no further cause identified for symptoms. Do no suspect oropharyngeal dysphagia, however warrants further evaluation. Per discussion with pt and MD, recommend VFSS to rule out pharyngeal component given severity of discomfort with solids. Over the weekend, recommend puree solids (4) and thin liquids (0) per pt preference, as she is unable to tolerate any solids past puree. From an SLP standpoint, pt is ok to advance diet as tolerated if she desires.   Total Session Time   Total Session Time (sum of timed and untimed services) 23

## 2023-06-16 NOTE — CONSULTS
"CLINICAL NUTRITION SERVICES  -  ASSESSMENT NOTE      Recommendations:   - Diet and nutrition-related POC per MD teams, EGD and SLP pending.  - Ok for prn Ensure or [place] Breakfast Essentials w/ diet advancement, discussed w/ Sera today.  - Daily MVI/M, when not NPO.     MALNUTRITION:  % Weight Loss:  > 20% in 1 year (severe malnutrition)  % Intake:  </= 75% for >/= 1 month (severe malnutrition)  Subcutaneous Fat Loss:  Orbital region moderate depletion and Upper arm region severe depletion  Muscle Loss:  Temporal region moderate to severe depletion, Clavicle bone region moderate to severe depletion, Acromion bone region moderate depletion, Patellar region moderate to severe depletion, Anterior thigh region moderate to severe depletion and Posterior calf region moderate to severe depletion  Fluid Retention: None documented    Malnutrition Diagnosis: Severe malnutrition  In Context of:  Acute illness or injury or chronic illness or disease (etiology unknown at this time)          REASON FOR ASSESSMENT  Sera Figueroa is a 78 year old female seen by Registered Dietitian for Provider Order - \"dysphagia and significant weight loss in 3 months\".    PMH of: Gastric bypass (documented as jejunal bypass, take down, gastric banding which was later removed), gastric ulcer, suspected COPD, chronic pain.    Admit 2/2: SOB, dysphagia, concern for wt loss, cardiac work-up.    NUTRITION HISTORY  - Information obtained from patient and chart.    - Diet at home: Regular w/ meals BID as baseline per report (late breakfast + dinner, generally does not snack).   - Barriers to PO intakes: Reported dysphagia w/ food getting stuck, low appetite, and wt loss over at least 3 months to MD teams.  Sera again describes she's had a change in eating for 3 months.  Is no longer able to eat her 2 meals, just small amounts of liquids throughout the day like milk, water, ice cream.  No dysphagia w/ liquids per report, did have vomiting or " "retching w/ solids.  - Use of oral supplements: None since gastric bypass surgery, this surgery was reversed per her report.  She tends to not like \"vitaminy drinks\".    - Support system is neighbors/friends, has a son and daughter in law but \"not helpful\" per her report.  Has a 6 yo dog.   - Allergies: NKFA.      CURRENT NUTRITION ORDERS  Diet Order:     NPO (for procedure)    Current Intake/Tolerance:  Limited timeframe since admission.  SLP consult pending.  Previously on liquids since admission.      Obtained from Chart/Interdisciplinary Team:  - GI consulted w/ EGD planned   - No documentation of PI  - Stooling patterns reviewed    ANTHROPOMETRICS  Height: 5' 0\"  Weight: 120 lbs 14.4 oz  Body mass index is 23.61 kg/m .  Weight Status:  Normal BMI  Weight History:  Wt Readings from Last 10 Encounters:   06/16/23 54.8 kg (120 lb 14.4 oz)   09/30/21 60.1 kg (132 lb 9.6 oz)   09/22/21 59 kg (130 lb)   10/03/16 84.5 kg (186 lb 4.8 oz)   10/02/16 84.9 kg (187 lb 3.2 oz)   09/30/16 85.3 kg (188 lb)   06/27/16 86.2 kg (190 lb)   05/27/16 86.6 kg (191 lb)   04/08/16 83.5 kg (184 lb)   03/01/16 82.4 kg (181 lb 9.6 oz)     - No current documentation of edema.    - Wt of 138# from 2/14/2023 --> 13% wt loss in the past 4 months.  - Wt of 169# from 8/31/2022 --> 29% wt loss in <1 year.  - Sera tells me her lowest weight at home was around 115# and she started out closer to 155#.    LABS  Labs reviewed.      MEDICATIONS  Medications reviewed.      ASSESSED NUTRITION NEEDS PER APPROVED PRACTICE GUIDELINES:    Dosing Weight 55 kg   Estimated Energy Needs: >/=1375 kcals (>/=25 Kcal/Kg)  Justification: repletion  Estimated Protein Needs: >/=66 grams protein - (>/=1.2 g pro/Kg)  Justification: Repletion and preservation of lean body mass  Estimated Fluid Needs: per MD      NUTRITION DIAGNOSIS:  Malnutrition related to decreased oral intake, dysphagia w/ unclear etiology as evidenced by meeting <75% needs or less for 3 months, " severe wt loss, overt fat/muscle loss.    NUTRITION INTERVENTIONS  Recommendations / Nutrition Prescription  See above.      Implementation  Nutrition education: Provided education on above.    Medical Food Supplement: As above.     Nutrition Goals  Diet advancement vs nutrition support consideration w/in 48-72 hrs.      MONITORING AND EVALUATION:  Progress towards goals will be monitored and evaluated per protocol and Practice Guidelines          Sherita Luna RDN, LD  Clinical Dietitian  3rd floor/ICU: [phone]  All other floors: [phone]  Weekend/holiday: [phone]  Office: [phone]

## 2023-06-16 NOTE — PROGRESS NOTES
SLP NOTE: The patient is NPO for an EGD. SLP continues to follow and will initiate evaluation as indicated when appropriate.

## 2023-06-16 NOTE — PROGRESS NOTES
[facility]    Hospitalist Progress Note    Date of Service (when I saw the patient): 06/16/2023  Provider:  Christiano Giron MD   Text Page  7am - 6PM       Assessment & Plan   Sera Figueroa is a 78 year old female with PMH including CAD s/p LAD stent, obesity s/p gastric bypass surgery, gastric ulcer, large ventral hernia, MDD, HLD, anxiety, and 50-year smoking history who quit several weeks ago who presents with shortness of breath and dysphagia.    Assessment and Plan:   Stress CM ( second time in her life).  Patient report of being under terrible stress in the last 12-18 months, lost of many family members including her , sister and daughter.  Coronary angiogram proves a stress cardiomyopathy. LV EF 30-35 %  Severely elevated LV end-diastolic blood pressure contributing to HFrEF   Chest pain, dyspnea secondary to the above  NSTEMI secondary to the above   Orthostatic hypotension secondary to the above  History of CAD.  Stent patency, not significant arthrosclerotic disease    Presenting with 3 weeks of shortness of breath that is worse with exertion associate with lightheadedness when getting up and intermittent chest tightness both at rest and with exertion.  She does not have any lower extremity edema.  No previous history of heart failure although she has had 2 stents placed in the past including into the LAD.  Most recent angiogram was from 2013 and shows some in-stent stenosis and other moderate coronary disease.  NT proBNP is elevated at 25,000 and her troponin T is elevated at 114.  D-dimer undetectable.  EKG shows NSR with significant T wave inversions in leads V3-V6 that is new.  Blood pressure was 75/60 in the ER that responded to 1 L of NS and is 95/52.  She does take an 81 mg daily aspirin.  Suspect she has had worsening of her coronary disease resulting in angina symptoms and now NSTEMI with elevated troponin.  Thinks hypotension is from hypovolemia secondary to her  dysphagia as below.  -Cardiology consulted, recommendation appreciated.  -TTE done, results available at the walls and other .  -Serial troponin T level completed  -Cardiac monitoring  -Cardiac diet  -Continue PTA 81 mg aspirin (received 325 mg aspirin)  -Heparin drip  -Lipid panel completed  -No beta-blocker or ACE inhibitor due to hypotension.  Cardiology is recommending vasopressin or phenylephrine if needed  -MAP is 68, HR is normal.     Tobacco dependence  Suspected COPD: Scheduled for COPD testing next week, likely PFTs.  She smoked for 50 years and quit several weeks ago.  No wheezing on exam now.  Has had shortness of breath for the past 3 weeks as above.     Dysphagia  Unintentional weight loss  History gastric bypass  History gastric ulcer: Months of dysphagia more with solids than liquids.  Food feels like he gets stuck in her esophagus and she has a lot of dry heaving regurgitation after this.  Denies any significant heartburn symptoms.  Has had previous gastric ulcer and takes daily 40 mg Protonix.  Has also had previous gastric bypass surgery.  Has lost 50 pounds over the last 3 months due to decreased appetite and swallowing issues.  My main concern would be for possible esophageal cancer given her extensive smoking history and description of her symptoms.  She is actually scheduled for EGD less than 2 weeks from now.  Denies any melena or hematochezia.  -Given she may need to be on dual antiplatelet therapy if she has a stent placed I have consulted [facility] for EGD consideration while here.  Additionally she is hypotensive from not getting enough fluids orally.  -Consult SLP  -If endoscopy is unrevealing she may need further imaging of the neck with video swallow study versus CT soft tissue versus thyroid ultrasound given her hypothyroidism  -Consult dietitian  -Continue 40 mg daily Protonix     Tobacco dependence  Suspected COPD: She reports she is supposed to have COPD testing next week,  suspect is PFTs.  She smoked for 50 years and quit several weeks ago.  No wheezing on exam now.  Has had shortness of breath for the past 3 weeks as above.  Normal O2 sat RA.      MDD  Anxiety: Resume PTA duloxetine.     Chronic pain syndrome: She is on Norco 5 mg tablets 3 times a day as needed for chronic pain.  She may also be on gabapentin, awaiting formal med rec.  -Resume Norco 5 mg tablets at every 6 hours as needed  -Awaiting med rec, resume gabapentin if taking     Hypothyroidism: Resume PTA levothyroxine 75 mcg daily.       DVT Prophylaxis: PCD  Code Status: Full Code    Disposition: Expected discharge in 2-3 days once EGD done and work up completed    Interval History   Slept well all night. No pain after Oxycodone. Breathing RA, normal O2 sat. No chest pain at the moment.  NO SOB.     -Data reviewed today: I reviewed all new labs and imaging results over the last 24 hours. I personally reviewed the EKG tracing showing sinus rhythm .    Physical Exam   Temp: 98  F (36.7  C) Temp src: Oral BP: 99/53 Pulse: 87   Resp: 18 SpO2: 98 % O2 Device: None (Room air) Oxygen Delivery: 3 LPM  Vitals:    06/14/23 2146 06/16/23 0559   Weight: 52.2 kg (115 lb) 54.8 kg (120 lb 14.4 oz)     Vital Signs with Ranges  Temp:  [96.9  F (36.1  C)-98  F (36.7  C)] 98  F (36.7  C)  Pulse:  [77-96] 87  Resp:  [15-20] 18  BP: ()/(49-95) 99/53  SpO2:  [91 %-98 %] 98 %  I/O last 3 completed shifts:  In: 1024 [P.O.:720; I.V.:304]  Out: -     GEN:  Alert, oriented x 3, appears comfortable, NAD.  HEENT:  Normocephalic/atraumatic, no scleral icterus, no nasal discharge, mouth moist.  CV:  Regular rate and rhythm, ENDY ii/vi LSB. No  JVD.  S1 + S2 noted, no S3 or S4.  LUNGS:  Clear to auscultation bilaterally without rales/rhonchi/wheezing/retractions.  Symmetric chest rise on inhalation noted.  ABD:  Active bowel sounds, soft, non-tender/non-distended.  No rebound/guarding/rigidity.  EXT:  No edema or cyanosis.  No joint synovitis  noted.  SKIN:  Dry to touch, no exanthems noted in the visualized areas.       Medications     - MEDICATION INSTRUCTIONS -       - MEDICATION INSTRUCTIONS -       - MEDICATION INSTRUCTIONS -       ACE/ARB/ARNI NOT PRESCRIBED       BETA BLOCKER NOT PRESCRIBED       STATIN NOT PRESCRIBED         aspirin  81 mg Oral Daily     atorvastatin  40 mg Oral QPM     DULoxetine  60 mg Oral BID     levothyroxine  75 mcg Oral Daily     pantoprazole  40 mg Oral Daily     sodium chloride (PF)  3 mL Intracatheter Q8H       Data   Recent Labs   Lab 06/15/23  2215 06/15/23  0728 23  2233   WBC  --  6.7 7.4   HGB  --  11.4* 12.2   MCV  --  91 90   PLT  --  258 306   NA  --  139 136   POTASSIUM  --  4.0 4.5   CHLORIDE  --  110* 102   CO2  --  18* 23   BUN  --  16.4 22.0   CR  --  0.52 0.59   ANIONGAP  --  11 11   SUKUMAR  --  8.3* 9.0   GLC 88 104* 93   ALBUMIN  --   --  3.4*   PROTTOTAL  --   --  5.8*   BILITOTAL  --   --  0.8   ALKPHOS  --   --  69   ALT  --   --  11   AST  --   --  21       Recent Results (from the past 24 hour(s))   Echocardiogram Complete   Result Value    LVEF  30-35%    Narrative    [id number]  [id number]  [id number]^MARCELO^EVY^SHAYNA     [facility]  Echocardiography Laboratory  [address] [place] [address]     Name: GREGORY PERDOMO  MRN: [id number]  : 1945  Study Date: 06/15/2023 08:33 AM  Age: 78 yrs  Gender: Female  Patient Location: [facility]  Reason For Study: Chest Pain, Chest Tightness, Chest Pressure  Ordering Physician: EVY ROBERTSON  Performed By: Meron Garcia     BSA: 1.5 m2  Height: 60 in  Weight: 115 lb  HR: 81  BP: 76/50 mmHg  ______________________________________________________________________________  Procedure  Complete Portable Echo Adult. Optison (NDC #8506-1679) given intravenously.  ______________________________________________________________________________  Interpretation Summary     The visual ejection fraction is 30-35%.  Left  "ventricular systolic function is moderately reduced.  Only the basal portions of the left ventricle contract normally with the  remaining segments being severely hypokinetic to akinetic. Takotsubo stress  cardiomyopathy would have an appearance similar to this and dynamic outflow  tract obstruction can occur with this condition due to the hyperdynamic base.  There is mild to moderate (1-2+) mitral regurgitation.  There is systolic anterior motion of the mitral valve.  There is mild to moderate (1-2+) tricuspid regurgitation.  There is trace aortic regurgitation.  Trivial pericardial effusion  Mild aortic root dilatation.  There has been a significant drop in LV systolic function since 2013. The  study was technically difficult.  ______________________________________________________________________________  Left Ventricle  The left ventricle is normal in size. There is normal left ventricular wall  thickness. A sigmoid septum is present. Diastolic Doppler findings (E/E' ratio  and/or other parameters) suggest left ventricular filling pressures are  increased. The visual ejection fraction is 30-35%. Left ventricular systolic  function is moderately reduced. A severe left ventricular outflow tract (LVOT)  obstruction is present. The resting dynamic LVOT gradient is 105 mmHg. The  recorded Valsalva gradient is erroneous and does not have the \"dagger\" shape  of an dynamic outflow signal and most likely represents mitral regurgitation  signal. Only the basal portions of the left ventricle contract normally with  the remaiong segments being severely hypokinetic to akinetic. Takotsubo stress  cardiomyopathy would have an appearance similar to this and dynamic outflow  tract obstruction can occur with this condition due to the hyperdynamic base.     Right Ventricle  The right ventricle is normal in size and function.     Atria  The left atrium is moderately dilated. Right atrial size is normal. Lipomatous  hypertrophy of " the interatrial septum is noted. There is no color Doppler  evidence of an atrial shunt.     Mitral Valve  There is mild mitral annular calcification. The mitral valve leaflets are  mildly thickened. There is systolic anterior motion of the mitral valve. The  mitral valve chordae are thickened and/or calcified. There is mild to moderate  (1-2+) mitral regurgitation. The mitral regurgitant jet is posteriorly  directed, which is consistent with anterior leaflet pathology.     Tricuspid Valve  There is mild to moderate (1-2+) tricuspid regurgitation. The right  ventricular systolic pressure is approximated at 24.7 mmHg plus the right  atrial pressure. IVC diameter >2.1 cm collapsing <50% with sniff suggests a  high RA pressure estimated at 15 mmHg or greater. Right ventricular systolic  pressure is elevated, consistent with mild to moderate pulmonary hypertension.     Aortic Valve  The aortic valve is not well visualized. Thickened aortic valve leaflets.  There is trace aortic regurgitation. The peak AoV pressure gradient is 12.0  mmHg. The mean AoV pressure gradient is 7.0 mmHg. No hemodynamically  significant valvular aortic stenosis.     Pulmonic Valve  The pulmonic valve is not well visualized. Normal pulmonic valve velocity.     Vessels  Mild aortic root dilatation. IVC diameter >2.1 cm collapsing <50% with sniff  suggests a high RA pressure estimated at 15 mmHg or greater.     Pericardium  Trivial pericardial effusion.     Rhythm  Sinus rhythm was noted.  ______________________________________________________________________________  MMode/2D Measurements & Calculations     IVSd: 0.95 cm  LVIDd: 4.0 cm  LVIDs: 3.2 cm  LVPWd: 0.89 cm  IVC diam: 2.3 cm  FS: 20.8 %  LV mass(C)d: 114.9 grams  LV mass(C)dI: 77.8 grams/m2  Ao root diam: 3.8 cm  LVOT diam: 2.4 cm  LVOT area: 4.5 cm2  LA Volume (BP): 64.5 ml  LA Volume Index (BP): 43.6 ml/m2  RV Base: 2.9 cm  RWT: 0.44     TAPSE: 1.5 cm     Doppler Measurements &  Calculations  MV E max sharath: 85.2 cm/sec  MV A max sharath: 116.0 cm/sec  MV E/A: 0.73  MV max P.0 mmHg  MV mean P.0 mmHg  MV V2 VTI: 23.5 cm  MV P1/2t max sharath: 87.3 cm/sec  MV P1/2t: 61.3 msec  MVA(P1/2t): 3.6 cm2  MV dec slope: 417.0 cm/sec2  MV dec time: 0.21 sec  Ao V2 max: 175.0 cm/sec  Ao max P.0 mmHg  Ao V2 mean: 120.0 cm/sec  Ao mean P.0 mmHg  Ao V2 VTI: 31.2 cm  AI P1/2t: 225.1 msec  MR PISA: 4.0 cm2  MR ERO: 0.28 cm2  MR volume: 46.2 ml  PA V2 max: 109.0 cm/sec  PA max P.8 mmHg  PA mean P.0 mmHg  PA V2 VTI: 19.8 cm  PA acc time: 0.16 sec  TR max sharath: 248.5 cm/sec  TR max P.7 mmHg  E/E' av.0  Lateral E/e': 14.8  Medial E/e': 21.1  RV S Sharath: 12.8 cm/sec     ______________________________________________________________________________  Report approved by: Shelbi Barry 06/15/2023 11:29 AM         Cardiac Catheterization   Result Value    Cath EF Estimated 35    Narrative    Proximal-mid LAD stent is patent with 40-50% ISR.  Compared to the report   of the 2013 angiogram, this is probably not substantially changed.  Mild non-obstructive CAD elsewhere  Severely elevated left-sided filling pressures (LVEDP 29 mmHg)  LV gram shows basal hyperkinesis, with mid-apical hypokinesis/akinesis,   suggestive of Takotsubo (stress-induced) cardiomyopathy.           Securely message with the Vocera Web Console (learn more here)  Text page via [employer] Paging/Directory        Disclaimer: This note consists of symbols derived from keyboarding, dictation and/or voice recognition software. As a result, there may be errors in the script that have gone undetected. Please consider this when interpreting information found in this chart.

## 2023-06-17 NOTE — PROGRESS NOTES
Cardiology Progress Note          Assessment and Plan:         78 year old female with a past medical history of CAD s/p LAD stent, obesity s/p gastric bypass surgery, gastric ulcer, large ventral hernia, MDD, HLD, anxiety, and 50-year smoking history who was admitted on 2023 after presenting with shortness of breath and dysphagia.  She has been found to have a stress cardiomyopathy with an LVEF of 30 to 35% and intermittent hypotension secondary to  LVOT obstruction in setting of proximal septal thickening and systolic anterior motion of the mitral valve.    IMPRESSION:    1.  Stress cardiomyopathy with an LVEF of 30 to 35% and LVOT obstruction in the setting of proximal septal thickening and systolic anterior motion of the mitral valve.  2.  Coronary artery disease with mild to moderate in-stent restenosis of the proximal LAD stent.  3.  Hypertension, which has improved on midodrine.  4.  Dysphagia.    PLAN  -Hemodynamics have improved with the addition of midodrine.  -At this point in time she is not a candidate for beta-blocker/ACE inhibitor therapy given her basal hyperkinesis and LVOT obstructive physiology.  -Hopefully her obstructive physiology will resolve with normalization of left ventricular systolic function and cardioprotective medications can be initiated at that time.          Interval History:     Feels much better this morning.  Denies any chest discomfort.  Blood pressures have improved with midodrine.             Review of Systems:   As per subjective, otherwise 5 systems reviewed and negative.           Physical Exam:   Blood pressure (!) 81/49, pulse 79, temperature 97.5  F (36.4  C), temperature source Oral, resp. rate 16, height 1.524 m (5'), weight 54.8 kg (120 lb 14.4 oz), SpO2 98 %.      Vital Sign Ranges  Temperature Temp  Av.7  F (36.5  C)  Min: 97.5  F (36.4  C)  Max: 98  F (36.7  C)   Blood pressure Systolic (24hrs), Av , Min:76 , Max:118        Diastolic (24hrs), Av,  Min:46, Max:68      Pulse Pulse  Av.2  Min: 70  Max: 88   Respirations Resp  Av.5  Min: 14  Max: 26   Pulse oximetry SpO2  Av.6 %  Min: 95 %  Max: 100 %       No intake or output data in the 24 hours ending 23    Constitutional: NAD  Skin: Warm and dry  Neck: No JVD  Lungs: CTA  Cardiovascular: RRR, 2/6 systolic ejection murmur  Abdomen: Soft, non tender.  Extremities and Back: No LE edema  Neurological: Nonfocal           Medications:     I have reviewed this patient's current medications.              Data:     Labs reviewed.         Jorge Luis Rebolledo MD, M.D.

## 2023-06-17 NOTE — PLAN OF CARE
"Goal Outcome Evaluation:      Plan of Care Reviewed With: patient    Overall Patient Progress: no changeOverall Patient Progress: no change    Outcome Evaluation: Vss with soft BPs near baseline, scheduled midodrine given, no co cp/sob, oxycodone given for pain with reduction. Tele ST inverted Ts.  Up with Ax1+gb, BA on for safety, encouraged PO intake, pt states at times she feels \"light headed\" so she has declined oob to chair this shift, falls risk.  Plan for swallow study on Monday then dc pending findings. Continue POC and monitoring.      Problem: Plan of Care - These are the overarching goals to be used throughout the patient stay.    Goal: Plan of Care Review  Description: The Plan of Care Review/Shift note should be completed every shift.  The Outcome Evaluation is a brief statement about your assessment that the patient is improving, declining, or no change.  This information will be displayed automatically on your shift note.  Outcome: Not Progressing  Flowsheets (Taken 6/17/2023 7926)  Outcome Evaluation: Vss with soft BPs near baseline, scheduled midodrine given, no co cp/sob, oxycodone given for pain with reduction. Tele ST inverted Ts.  Up with Ax1+gb, BA on for safety, encouraged PO intake, pt states at times she feels \"light headed\" so she has declined oob to chair this shift, falls risk.  Plan for swallow study on Monday then dc pending findings. Continue POC and monitoring.  Plan of Care Reviewed With: patient  Overall Patient Progress: no change  Goal: Patient-Specific Goal (Individualized)  Description: You can add care plan individualizations to a care plan. Examples of Individualization might be:  \"Parent requests to be called daily at 9am for status\", \"I have a hard time hearing out of my right ear\", or \"Do not touch me to wake me up as it startles me\".  Outcome: Not Progressing  Goal: Absence of Hospital-Acquired Illness or Injury  Outcome: Not Progressing  Intervention: Identify and Manage " Fall Risk  Recent Flowsheet Documentation  Taken 6/17/2023 1320 by Katalina Flanagan RN  Safety Promotion/Fall Prevention: safety round/check completed  Taken 6/17/2023 1205 by Katalina Flanagan RN  Safety Promotion/Fall Prevention: safety round/check completed  Taken 6/17/2023 1137 by Katalina Flanagan RN  Safety Promotion/Fall Prevention: safety round/check completed  Taken 6/17/2023 1027 by Katalina Flanagan RN  Safety Promotion/Fall Prevention: safety round/check completed  Taken 6/17/2023 0938 by Katalina Flanagan RN  Safety Promotion/Fall Prevention: safety round/check completed  Taken 6/17/2023 0813 by Katalina Flanagan RN  Safety Promotion/Fall Prevention:   treat underlying cause   treat reversible contributory factors   supervised activity   safety round/check completed   room organization consistent   room near nurse's station   activity supervised   assistive device/personal items within reach   clutter free environment maintained   increased rounding and observation   increase visualization of patient   lighting adjusted   nonskid shoes/slippers when out of bed  Taken 6/17/2023 0726 by Katalina Flanagan RN  Safety Promotion/Fall Prevention: safety round/check completed  Intervention: Prevent Skin Injury  Recent Flowsheet Documentation  Taken 6/17/2023 0813 by Katalina Flanagan RN  Body Position: position changed independently  Goal: Optimal Comfort and Wellbeing  Outcome: Not Progressing  Intervention: Monitor Pain and Promote Comfort  Recent Flowsheet Documentation  Taken 6/17/2023 1202 by Katalina Flanagan RN  Pain Management Interventions: declines  Taken 6/17/2023 1026 by Katalina Flanagan RN  Pain Management Interventions:   medication offered but refused   declines   emotional support   care clustered   quiet environment facilitated  Taken 6/17/2023 0937 by Katalina Flanagan, RN  Pain Management Interventions:   medication (see MAR)   emotional support   quiet environment facilitated    distraction   care clustered  Goal: Readiness for Transition of Care  Outcome: Not Progressing     Problem: Malnutrition  Goal: Improved Nutritional Intake  Outcome: Not Progressing     Problem: Cardiac Output Decreased  Goal: Effective Cardiac Output  Outcome: Not Progressing  Intervention: Optimize Cardiac Output  Recent Flowsheet Documentation  Taken 6/17/2023 0813 by Katalina Flanagan RN  Head of Bed (HOB) Positioning: HOB at 20-30 degrees     Problem: Oral Intake Inadequate  Goal: Improved Oral Intake  Outcome: Not Progressing     Problem: Pain Chronic (Persistent)  Goal: Optimal Pain Control and Function  Outcome: Progressing  Intervention: Develop Pain Management Plan  Recent Flowsheet Documentation  Taken 6/17/2023 1202 by Katalina Flanagan, RN  Pain Management Interventions: declines  Taken 6/17/2023 1026 by Katalina Flanagan RN  Pain Management Interventions:   medication offered but refused   declines   emotional support   care clustered   quiet environment facilitated  Taken 6/17/2023 0937 by Katalina Flanagan, RN  Pain Management Interventions:   medication (see MAR)   emotional support   quiet environment facilitated   distraction   care clustered  Intervention: Manage Persistent Pain  Recent Flowsheet Documentation  Taken 6/17/2023 0813 by Katalina Flanagan RN  Medication Review/Management: medications reviewed     Problem: Nausea and Vomiting  Goal: Nausea and Vomiting Relief  Outcome: Progressing

## 2023-06-17 NOTE — PROVIDER NOTIFICATION
Paged dr cheatham: BP 89/50, asymptomatic, will give scheduled midodrine. please advise as indicated. thanks.

## 2023-06-17 NOTE — PROGRESS NOTES
AOx4  Activity: Ax1 w gb walker - SBA depending on weakness  O2: RA  B&B: Continent. Up to bedside commode  Diet: Transitioned to pureed diet and thin liquids. Pt tolerating but only eating small amounts.    PIV: SL     Tele: SR    Other: Chronic back pain 9/10. Prn NORCO given.    D/c plans: Pending.

## 2023-06-17 NOTE — PLAN OF CARE
/65 (BP Location: Left arm)   Pulse 79   Temp 97.6  F (36.4  C) (Oral)   Resp 16   Ht 1.524 m (5')   Wt 55.6 kg (122 lb 9.6 oz)   SpO2 95%   BMI 23.94 kg/m      Shift Summary Note  [id number]    VSS: denies SOB, O2 sats stable on RA,  Bps soft at times, in mid 90s for diastolic.  Pain: reported in abdomen, radiating to back. Unmanaged by Amber, pt. Requested other PRNs (*see MAR).  Orientation: AOX4  Mobility: A1  GB  /GI: Up to commode + continent  Skin: redness/rash in anjelica area, under pannus. Scary + hernia of L side of the abdomen from surgery.  Diet: Pureed, d\t prior difficulty swallowing, passed swallow eval.    Tele: SR w inverted T-waves    Plan of Care: continue to promote pain mgmt + increase oral intake/nutrition  Discharge plan: [facility] to [facility] TBD      Katalina Newby RN  June 17, 2023  6:47 AM      Goal Outcome Evaluation:      Plan of Care Reviewed With: patient    Overall Patient Progress: no change  Overall Patient Progress: no change    Outcome Evaluation: difficulty controlling pain, intermittent nausea + dry heaving with episodes of heart burn.

## 2023-06-17 NOTE — PLAN OF CARE
Goal Outcome Evaluation:  Cared for from 0700 -  1500      Plan of Care Reviewed With: patient      Pt A&O x 4. VSS but BP soft. On midodrine. Assist x 1 with gait belt and walker. On room air. LS clear. BS+. Had an EGD done today. C\o headache and lower back pain. PRN Tylenol and Oxycodone was given and was effective. PIV SL. Denies SOB or nausea. Had angiogram done yesterday and site is WDL. On ASA

## 2023-06-17 NOTE — PROGRESS NOTES
[facility]    Hospitalist Progress Note    Date of Service (when I saw the patient): 06/17/2023  Provider:  Christiano Giron MD   Text Page  7am - 6PM       Assessment & Plan   Sera Figueroa is a 78 year old female with PMH including CAD s/p LAD stent, obesity s/p gastric bypass surgery, gastric ulcer, large ventral hernia, MDD, HLD, anxiety, and 50-year smoking history who quit several weeks ago who presents with shortness of breath and dysphagia.    Assessment and Plan:   Stress CM ( second time in her life).  Patient report of being under terrible stress in the last 12-18 months, lost of many family members including her , sister and daughter.  Coronary angiogram proves a stress cardiomyopathy. LV EF 30-35 %  Severely elevated LV end-diastolic blood pressure contributing to HFrEF   Dynamic LVOT obstruction.  Chest pain, dyspnea secondary to the above  NSTEMI secondary to the above   Orthostatic hypotension secondary to the above  History of CAD.  Stent patency, not significant arthrosclerotic disease    Presenting with 3 weeks of shortness of breath that is worse with exertion associate with lightheadedness when getting up and intermittent chest tightness both at rest and with exertion.  She does not have any lower extremity edema.  No previous history of heart failure although she has had 2 stents placed in the past including into the LAD.  Most recent angiogram was from 2013 and shows some in-stent stenosis and other moderate coronary disease.  NT proBNP is elevated at 25,000 and her troponin T is elevated at 114.  D-dimer undetectable.  EKG shows NSR with significant T wave inversions in leads V3-V6 that is new.  Blood pressure was 75/60 in the ER that responded to 1 L of NS and is 95/52.  She does take an 81 mg daily aspirin.  Suspect she has had worsening of her coronary disease resulting in angina symptoms and now NSTEMI with elevated troponin.  Thinks hypotension is from  hypovolemia secondary to her dysphagia as below.  -Cardiology consulted, recommendation appreciated.  -TTE done, results available    -Serial troponin T level completed  -Cardiac monitoring  -Cardiac diet  -Continue PTA 81 mg aspirin (received 325 mg aspirin)  -Lipid panel completed  -No beta-blocker or ACE inhibitor due to hypotension.  Cardiology is recommending vasopressin or phenylephrine if needed  -MAP is 68, HR is normal.     Tobacco dependence  Suspected COPD: Scheduled for COPD testing next week, likely PFTs.  She smoked for 50 years and quit several weeks ago.  No wheezing on exam now.  Has had shortness of breath for the past 3 weeks as above.     Dysphagia  Unintentional weight loss  History gastric bypass  History gastric ulcer: Months of dysphagia more with solids than liquids.  Food feels like he gets stuck in her esophagus and she has a lot of dry heaving regurgitation after this.  Denies any significant heartburn symptoms.  Has had previous gastric ulcer and takes daily 40 mg Protonix.  Has also had previous gastric bypass surgery.  Has lost 50 pounds over the last 3 months due to decreased appetite and swallowing issues.  Esophageal cancer ruled out by EGD.  Denies any melena or hematochezia.  - SLP cosulted. Video swallowing on Monday  -She may need further imaging of the neck with video swallow study versus CT soft tissue versus thyroid ultrasound given her hypothyroidism  -Consult dietitian. Done  -Continue 40 mg daily Protonix     Tobacco dependence  Suspected COPD: She reports she is supposed to have COPD testing next week, suspect is PFTs.  She smoked for 50 years and quit several weeks ago.  No wheezing on exam now.  Has had shortness of breath for the past 3 weeks as above.  Normal O2 sat RA.      MDD  Anxiety: Resume PTA duloxetine.     Chronic pain syndrome: She is on Norco 5 mg tablets 3 times a day as needed for chronic pain.  She may also be on gabapentin, awaiting formal med  rec.  -Resume Norco 5 mg tablets at every 6 hours as needed    Hypothyroidism: Resume PTA levothyroxine 75 mcg daily.    Malnutrition Diagnosis: Severe malnutrition  In Context of:  Acute illness or injury or chronic illness or disease (etiology unknown at this time)       DVT Prophylaxis: PCD  Code Status: Full Code    Disposition: Expected discharge in 1- 2 days once swallowing assessment done by SLP    Interval History   Slept well. Breathing RA, normal O2 sat. No chest pain at the moment.  NO SOB.  Occasional episode of transient dizziness when sitting.  No new symptoms.  EGD done yesterday overall negative.  Pending video swallowing on Monday.    -Data reviewed today: I reviewed all new labs and imaging results over the last 24 hours. I personally reviewed the EKG tracing showing sinus rhythm .    Physical Exam   Temp: 98  F (36.7  C) Temp src: Oral BP: 91/73 Pulse: 78   Resp: 18 SpO2: 96 % O2 Device: None (Room air) Oxygen Delivery: 2 LPM  Vitals:    06/14/23 2146 06/16/23 0559 06/17/23 0342   Weight: 52.2 kg (115 lb) 54.8 kg (120 lb 14.4 oz) 55.6 kg (122 lb 9.6 oz)     Vital Signs with Ranges  Temp:  [97.5  F (36.4  C)-98  F (36.7  C)] 98  F (36.7  C)  Pulse:  [70-88] 78  Resp:  [14-26] 18  BP: ()/(46-73) 91/73  SpO2:  [95 %-100 %] 96 %  No intake/output data recorded.    GEN:  Alert, oriented x 3, appears comfortable, NAD.  HEENT:  Normocephalic/atraumatic, no scleral icterus, no nasal discharge, mouth moist.  CV:  Regular rate and rhythm, ENDY ii/vi LSB. No  JVD.  S1 + S2 noted, no S3 or S4.  LUNGS:  Clear to auscultation bilaterally without rales/rhonchi/wheezing/retractions.  Symmetric chest rise on inhalation noted.  ABD:  Active bowel sounds, soft, non-tender/non-distended.  No rebound/guarding/rigidity.  EXT:  No edema or cyanosis.  No joint synovitis noted.  SKIN:  Dry to touch, no exanthems noted in the visualized areas.       Medications     - MEDICATION INSTRUCTIONS -       - MEDICATION  INSTRUCTIONS -       - MEDICATION INSTRUCTIONS -       ACE/ARB/ARNI NOT PRESCRIBED       BETA BLOCKER NOT PRESCRIBED       STATIN NOT PRESCRIBED         aspirin  81 mg Oral Daily     atorvastatin  40 mg Oral QPM     DULoxetine  60 mg Oral BID     levothyroxine  75 mcg Oral Daily     midodrine  2.5 mg Oral TID w/meals     multivitamin w/minerals  1 tablet Oral Daily     pantoprazole  40 mg Oral Daily     sodium chloride (PF)  3 mL Intracatheter Q8H     sodium chloride (PF)  3 mL Intracatheter Q8H       Data   Recent Labs   Lab 06/15/23  2215 06/15/23  0728 06/14/23  2233   WBC  --  6.7 7.4   HGB  --  11.4* 12.2   MCV  --  91 90   PLT  --  258 306   NA  --  139 136   POTASSIUM  --  4.0 4.5   CHLORIDE  --  110* 102   CO2  --  18* 23   BUN  --  16.4 22.0   CR  --  0.52 0.59   ANIONGAP  --  11 11   SUKUMAR  --  8.3* 9.0   GLC 88 104* 93   ALBUMIN  --   --  3.4*   PROTTOTAL  --   --  5.8*   BILITOTAL  --   --  0.8   ALKPHOS  --   --  69   ALT  --   --  11   AST  --   --  21       No results found for this or any previous visit (from the past 24 hour(s)).      Securely message with the Vocera Web Console (learn more here)  Text page via [facility] Paging/Directory        Disclaimer: This note consists of symbols derived from keyboarding, dictation and/or voice recognition software. As a result, there may be errors in the script that have gone undetected. Please consider this when interpreting information found in this chart.

## 2023-06-18 NOTE — PLAN OF CARE
Goal Outcome Evaluation:    AO x4. Tele: SR, inverted Ts. BPs soft. PRN Tums given for heartburn. PRN Oxycodone given for abdominal pain. Assist x1 with gait belt and walker. Plan: SLP video swallow 6/19, PT/speech following, more imaging possible.

## 2023-06-18 NOTE — PROGRESS NOTES
Cardiology Progress Note          Assessment and Plan:         78 year old female with a past medical history of CAD s/p LAD stent, obesity s/p gastric bypass surgery, gastric ulcer, large ventral hernia, MDD, HLD, anxiety, and 50-year smoking history who was admitted on 2023 after presenting with shortness of breath and dysphagia.  She has been found to have a stress cardiomyopathy with an LVEF of 30 to 35% and intermittent hypotension secondary to  LVOT obstruction in setting of proximal septal thickening and systolic anterior motion of the mitral valve.    IMPRESSION:    1.  Stress cardiomyopathy with an LVEF of 30 to 35% and LVOT obstruction in the setting of proximal septal thickening and systolic anterior motion of the mitral valve.  2.  Coronary artery disease with mild to moderate in-stent restenosis of the proximal LAD stent.  3.  Hypertension, which has improved on midodrine.  4.  Dysphagia.    PLAN  -Hemodynamics have improved with the addition of midodrine.  -At this point in time she is not a candidate for beta-blocker/ACE inhibitor therapy given her basal hyperkinesis and LVOT obstructive physiology.  -Hopefully her obstructive physiology will resolve with normalization of left ventricular systolic function and cardioprotective medications can be initiated at that time.  -Would consider repeat limited echo prior to discharge.          Interval History:     No chest discomfort this morning.  Continues to have dysphagia for which a video swallow is planned tomorrow.             Review of Systems:   As per subjective, otherwise 5 systems reviewed and negative.           Physical Exam:   Blood pressure 103/55, pulse 73, temperature 98  F (36.7  C), temperature source Oral, resp. rate 20, height 1.524 m (5'), weight 54.9 kg (121 lb 1.6 oz), SpO2 96 %.      Vital Sign Ranges  Temperature Temp  Av.7  F (36.5  C)  Min: 97.5  F (36.4  C)  Max: 98  F (36.7  C)   Blood pressure Systolic (24hrs), Av  , Min:76 , Max:118        Diastolic (24hrs), Av, Min:46, Max:68      Pulse Pulse  Av.2  Min: 70  Max: 88   Respirations Resp  Av.5  Min: 14  Max: 26   Pulse oximetry SpO2  Av.6 %  Min: 95 %  Max: 100 %       No intake or output data in the 24 hours ending 23    Constitutional: NAD  Skin: Warm and dry  Neck: No JVD  Lungs: CTA  Cardiovascular: RRR, 2/6 systolic ejection murmur  Abdomen: Soft, non tender.  Extremities and Back: No LE edema  Neurological: Nonfocal           Medications:     I have reviewed this patient's current medications.              Data:     Labs reviewed.         Jorge Luis Rebolledo MD, M.D.

## 2023-06-18 NOTE — PROVIDER NOTIFICATION
When finishing Admission profile pt brought up she would like to be DNR/DNI. Paged MD for code status change.

## 2023-06-18 NOTE — PROGRESS NOTES
Cross cover    RN: Melissa    Patient at this point is requesting to change her CODE status from FULL to DNR/I.  She is clear in her wishes, not sure why she was FULL code on admission. Orders changed to DNR/I    Sanjeev Toledo MD

## 2023-06-18 NOTE — PROGRESS NOTES
[facility]    Hospitalist Progress Note    Date of Service (when I saw the patient): 06/18/2023  Provider:  Christiano Giron MD   Text Page  7am - 6PM       Assessment & Plan   Sera Figueroa is a 78 year old female with PMH including CAD s/p LAD stent, obesity s/p gastric bypass surgery, gastric ulcer, large ventral hernia, MDD, HLD, anxiety, and 50-year smoking history who quit several weeks ago who presents with shortness of breath and dysphagia.    Assessment and Plan:   Stress CM ( second time in her life).  Patient report of being under terrible stress in the last 12-18 months, lost of many family members including her , sister and daughter.  Coronary angiogram proves a stress cardiomyopathy. LV EF 30-35 %  Severely elevated LV end-diastolic blood pressure contributing to HFrEF   Dynamic LVOT obstruction.  Chest pain, dyspnea secondary to the above  NSTEMI secondary to the above   Orthostatic hypotension secondary to the above  History of CAD.  Stent patency, not significant arthrosclerotic disease    Presenting with 3 weeks of shortness of breath that is worse with exertion associate with lightheadedness when getting up and intermittent chest tightness both at rest and with exertion.  She does not have any lower extremity edema.  No previous history of heart failure although she has had 2 stents placed in the past including into the LAD.  Most recent angiogram was from 2013 and shows some in-stent stenosis and other moderate coronary disease.  NT proBNP is elevated at 25,000 and her troponin T is elevated at 114.  D-dimer undetectable.  EKG shows NSR with significant T wave inversions in leads V3-V6 that is new.  Blood pressure was 75/60 in the ER that responded to 1 L of NS and is 95/52.  She does take an 81 mg daily aspirin.  Suspect she has had worsening of her coronary disease resulting in angina symptoms and now NSTEMI with elevated troponin.  Thinks hypotension is from  hypovolemia secondary to her dysphagia as below.  -Cardiology consulted, recommendation appreciated.  -TTE done, results available    -Serial troponin T level completed  -Cardiac monitoring  -Cardiac diet  -Continue PTA 81 mg aspirin (received 325 mg aspirin)  -Lipid panel completed  -No beta-blocker or ACE inhibitor due to hypotension.  Cardiology is recommending vasopressin or phenylephrine if needed  -MAP is 68, HR is normal.     Tobacco dependence  Suspected COPD: Scheduled for COPD testing next week, likely PFTs.  She smoked for 50 years and quit several weeks ago.  No wheezing on exam now.  Has had shortness of breath for the past 3 weeks as above.     Dysphagia  Unintentional weight loss  History gastric bypass  History gastric ulcer: Months of dysphagia more with solids than liquids.  Food feels like he gets stuck in her esophagus and she has a lot of dry heaving regurgitation after this.  Denies any significant heartburn symptoms.  Has had previous gastric ulcer and takes daily 40 mg Protonix.  Has also had previous gastric bypass surgery.  Has lost 50 pounds over the last 3 months due to decreased appetite and swallowing issues.  Esophageal cancer ruled out by EGD.  Denies any melena or hematochezia.  - SLP cosulted. Video swallowing on Monday  -She may need further imaging of the neck with video swallow study versus CT soft tissue versus thyroid ultrasound given her hypothyroidism  -Consult dietitian. Done  -Continue 40 mg daily Protonix     Tobacco dependence  Suspected COPD: She reports she is supposed to have COPD testing next week, suspect is PFTs.  She smoked for 50 years and quit several weeks ago.  No wheezing on exam now.  Has had shortness of breath for the past 3 weeks as above.  Normal O2 sat RA.      MDD  Anxiety: Resume PTA duloxetine.     Chronic pain syndrome: She is on Norco 5 mg tablets 3 times a day as needed for chronic pain.  She may also be on gabapentin, awaiting formal med  rec.  -Resume Norco 5 mg tablets at every 6 hours as needed    Hypothyroidism: Resume PTA levothyroxine 75 mcg daily.    Malnutrition Diagnosis: Severe malnutrition  In Context of:  Acute illness or injury or chronic illness or disease (etiology unknown at this time)       DVT Prophylaxis: PCD  Code Status: Full Code    Disposition: Expected discharge in 1- 2 days once swallowing assessment done by SLP    Interval History   Slept not muchl. Breathing RA, normal O2 sat. No chest pain at the moment.  NO SOB.  Occasional episode of transient dizziness when sitting.  No new symptoms.  Pending video swallowing on Monday.    -Data reviewed today: I reviewed all new labs and imaging results over the last 24 hours. I personally reviewed the EKG tracing showing sinus rhythm .    Physical Exam   Temp: 98  F (36.7  C) Temp src: Oral BP: 103/55 Pulse: 73   Resp: 20 SpO2: 96 % O2 Device: None (Room air)    Vitals:    06/16/23 0559 06/17/23 0342 06/18/23 0442   Weight: 54.8 kg (120 lb 14.4 oz) 55.6 kg (122 lb 9.6 oz) 54.9 kg (121 lb 1.6 oz)     Vital Signs with Ranges  Temp:  [97.5  F (36.4  C)-98  F (36.7  C)] 98  F (36.7  C)  Pulse:  [73-80] 73  Resp:  [18-20] 20  BP: ()/(48-61) 103/55  SpO2:  [96 %-98 %] 96 %  I/O last 3 completed shifts:  In: 603 [P.O.:600; I.V.:3]  Out: -     GEN:  Alert, oriented x 3, appears comfortable, NAD.  HEENT:  Normocephalic/atraumatic, no scleral icterus, no nasal discharge, mouth moist.  CV:  Regular rate and rhythm, ENDY ii/vi LSB. No  JVD.  S1 + S2 noted, no S3 or S4.  LUNGS:  Clear to auscultation bilaterally without rales/rhonchi/wheezing/retractions.  Symmetric chest rise on inhalation noted.  ABD:  Active bowel sounds, soft, non-tender/non-distended.  No rebound/guarding/rigidity.  EXT:  No edema or cyanosis.  No joint synovitis noted.  SKIN:  Dry to touch, no exanthems noted in the visualized areas.       Medications     - MEDICATION INSTRUCTIONS -       - MEDICATION INSTRUCTIONS -        - MEDICATION INSTRUCTIONS -       ACE/ARB/ARNI NOT PRESCRIBED       BETA BLOCKER NOT PRESCRIBED       STATIN NOT PRESCRIBED         aspirin  81 mg Oral Daily     atorvastatin  40 mg Oral QPM     DULoxetine  60 mg Oral BID     levothyroxine  75 mcg Oral Daily     midodrine  2.5 mg Oral TID w/meals     multivitamin w/minerals  1 tablet Oral Daily     pantoprazole  40 mg Oral Daily     sodium chloride (PF)  3 mL Intracatheter Q8H     sodium chloride (PF)  3 mL Intracatheter Q8H       Data   Recent Labs   Lab 06/18/23  0556 06/15/23  2215 06/15/23  0728 06/14/23  2233   WBC 4.7  --  6.7 7.4   HGB 11.5*  --  11.4* 12.2   MCV 93  --  91 90     --  258 306     --  139 136   POTASSIUM 4.1  --  4.0 4.5   CHLORIDE 106  --  110* 102   CO2 24  --  18* 23   BUN 7.9*  --  16.4 22.0   CR 0.47*  --  0.52 0.59   ANIONGAP 7  --  11 11   SUKUMAR 8.4*  --  8.3* 9.0   GLC 99 88 104* 93   ALBUMIN  --   --   --  3.4*   PROTTOTAL  --   --   --  5.8*   BILITOTAL  --   --   --  0.8   ALKPHOS  --   --   --  69   ALT  --   --   --  11   AST  --   --   --  21       No results found for this or any previous visit (from the past 24 hour(s)).      Securely message with the [employer] Console (learn more here)  Text page via [facility] Paging/Directory        Disclaimer: This note consists of symbols derived from keyboarding, dictation and/or voice recognition software. As a result, there may be errors in the script that have gone undetected. Please consider this when interpreting information found in this chart.

## 2023-06-18 NOTE — PLAN OF CARE
Goal Outcome Evaluation:      Plan of Care Reviewed With: patient    Overall Patient Progress: no changeOverall Patient Progress: no change     Temp: 97.5  F (36.4  C) Temp src: Oral BP: (!) 83/48 Pulse: 80   Resp: 20 SpO2: 98 % O2 Device: None (Room air)       A/O4. Up SBA to commode. Tele SR. Complains of heartburn, nausea, constipation, abd and back pain. PRN oxycodone given for pain. Denies nausea medication. KANDY SL. Video swallow study Monday. Bed alarm on.

## 2023-06-18 NOTE — PLAN OF CARE
"Goal Outcome Evaluation:      Plan of Care Reviewed With: patient    Overall Patient Progress: no change    Outcome Evaluation: .    Patient calm and cooperative. A/O x4. VS stable with soft BP's near baseline. BP's did increase after up with OT. On RA, maintaining sats above 90%. Tele is SR with inverted T waves. Assist of 1 with gait belt and walker. Pt c/o of intermittent nausea/dry-heaving but refused any medication and states \"it will pass in a few minutes\". No c/o of heartburn today. C\O of pain in abdomen and back 7/10. 5mg of Oxycodone given, did not relieve pain, so another 5mg administered. Pain was improved to 3/10. In the afternoon patient c\o again of pain in abdomen 7/10, 10mg of oxycodone administered. Pain reassessment did show slight improvement. Educational handouts on Hiatal hernia given to patient as she had many questions on her issue.     Problem: Plan of Care - These are the overarching goals to be used throughout the patient stay.    Goal: Plan of Care Review  Description: The Plan of Care Review/Shift note should be completed every shift.  The Outcome Evaluation is a brief statement about your assessment that the patient is improving, declining, or no change.  This information will be displayed automatically on your shift note.  Outcome: Progressing  Flowsheets (Taken 6/18/2023 1416)  Outcome Evaluation: .  Plan of Care Reviewed With: patient  Overall Patient Progress: no change  Goal: Patient-Specific Goal (Individualized)  Description: You can add care plan individualizations to a care plan. Examples of Individualization might be:  \"Parent requests to be called daily at 9am for status\", \"I have a hard time hearing out of my right ear\", or \"Do not touch me to wake me up as it startles me\".  Outcome: Progressing  Goal: Absence of Hospital-Acquired Illness or Injury  Outcome: Progressing  Intervention: Identify and Manage Fall Risk  Recent Flowsheet Documentation  Taken 6/18/2023 0904 by " Maximilian, Myra N, RN  Safety Promotion/Fall Prevention:   assistive device/personal items within reach   safety round/check completed  Intervention: Prevent Skin Injury  Recent Flowsheet Documentation  Taken 6/18/2023 0945 by Myra Yao RN  Body Position: position changed independently  Intervention: Prevent and Manage VTE (Venous Thromboembolism) Risk  Recent Flowsheet Documentation  Taken 6/18/2023 0945 by Myra Yao RN  VTE Prevention/Management: SCDs (sequential compression devices) off  Goal: Optimal Comfort and Wellbeing  Outcome: Progressing  Goal: Readiness for Transition of Care  Outcome: Progressing     Problem: Malnutrition  Goal: Improved Nutritional Intake  Outcome: Progressing     Problem: Pain Chronic (Persistent)  Goal: Optimal Pain Control and Function  Outcome: Progressing  Intervention: Manage Persistent Pain  Recent Flowsheet Documentation  Taken 6/18/2023 0945 by Myra Yao RN  Medication Review/Management: medications reviewed     Problem: Nausea and Vomiting  Goal: Nausea and Vomiting Relief  Outcome: Progressing  Intervention: Prevent and Manage Nausea and Vomiting  Recent Flowsheet Documentation  Taken 6/18/2023 0945 by Myra Yao RN  Nausea/Vomiting Interventions: (Refused anti-nausea meds) --     Problem: Cardiac Output Decreased  Goal: Effective Cardiac Output  Outcome: Progressing  Intervention: Optimize Cardiac Output  Recent Flowsheet Documentation  Taken 6/18/2023 0945 by Myra Yao RN  VTE Prevention/Management: SCDs (sequential compression devices) off     Problem: Oral Intake Inadequate  Goal: Improved Oral Intake  Outcome: Progressing

## 2023-06-19 NOTE — PLAN OF CARE
13:23 Shift Summary: Pt alert and oriented.  Has c/o abdominal pain after eating and chronic back pain. PRN oxycodone given.  Alert and oriented.  Ambulates with SBA, gait belt and walker.  Lungs diminished, clear.  Tele: SR.  Video swallow study done today.  Diet changed to soft bite sized (from pureed). Voiding without difficulty. PIV saline locked.  Cardiology, Speech,PT following. Echo done this afternoon. Discharge to TCU when bed available.

## 2023-06-19 NOTE — PROGRESS NOTES
[facility]    Medicine Progress Note - Hospitalist Service    Date of Admission:  6/14/2023    Assessment & Plan   Sera Figueroa is a 78 year old female with PMH including CAD s/p LAD stent, obesity s/p gastric bypass surgery, gastric ulcer, large ventral hernia, MDD, HLD, anxiety, and 50-year smoking history who quit several weeks ago who presents with shortness of breath and dysphagia.     Assessment and Plan:   Stress CM ( second time in her life).  Patient report of being under terrible stress in the last 12-18 months, lost of many family members including her , sister and daughter.  Coronary angiogram proves a stress cardiomyopathy. LV EF 30-35 %  Severely elevated LV end-diastolic blood pressure contributing to HFrEF   Dynamic LVOT obstruction.  Chest pain, dyspnea secondary to the above  NSTEMI secondary to the above   Orthostatic hypotension secondary to the above  History of CAD.  Stent patency, not significant arthrosclerotic disease     Presenting with 3 weeks of shortness of breath that is worse with exertion associate with lightheadedness when getting up and intermittent chest tightness both at rest and with exertion.  She does not have any lower extremity edema.  No previous history of heart failure although she has had 2 stents placed in the past including into the LAD.  Most recent angiogram was from 2013 and shows some in-stent stenosis and other moderate coronary disease.  NT proBNP is elevated at 25,000 and her troponin T is elevated at 114.  D-dimer undetectable.  EKG shows NSR with significant T wave inversions in leads V3-V6 that is new.  Blood pressure was 75/60 in the ER that responded to 1 L of NS and is 95/52.  She does take an 81 mg daily aspirin.  Suspect she has had worsening of her coronary disease resulting in angina symptoms and now NSTEMI with elevated troponin.  Thinks hypotension is from hypovolemia secondary to her dysphagia as below.  -Cardiology  consulted, recommendation appreciated.  -TTE 6/19 EF of 50-55 percent with grade 1 diastolic dysfunction   -[facility] 6/15/2025 without any significant CAD  -Serial troponin T level completed  -Cardiac monitoring  -Cardiac diet  -Continue PTA 81 mg aspirin (received 325 mg aspirin)  -Lipid panel completed  -No beta-blocker or ACE inhibitor due to hypotension.  Cardiology is recommending vasopressin or phenylephrine if needed     Tobacco dependence  Suspected COPD: Scheduled for COPD testing next week, likely PFTs.  She smoked for 50 years and quit several weeks ago.  No wheezing on exam now.  Has had shortness of breath for the past 3 weeks as above.     Dysphagia  Unintentional weight loss  History gastric bypass  History gastric ulcer: Months of dysphagia more with solids than liquids.  Food feels like he gets stuck in her esophagus and she has a lot of dry heaving regurgitation after this.  Denies any significant heartburn symptoms.  Has had previous gastric ulcer and takes daily 40 mg Protonix.  Has also had previous gastric bypass surgery.  Has lost 50 pounds over the last 3 months due to decreased appetite and swallowing issues.  Esophageal cancer ruled out by EGD.  Denies any melena or hematochezia.  -Video swallow 6/19 with flash penetration of thin liquids no jerry aspiration, SLP recs soft bite-size diet with thin liquids appreciated  -Consult dietitian. Done  -Continue 40 mg daily Protonix     Tobacco dependence  Suspected COPD: She reports she is supposed to have COPD testing next week, suspect is PFTs.  She smoked for 50 years and quit several weeks ago.  No wheezing on exam now.  Has had shortness of breath for the past 3 weeks as above.  Normal O2 sat RA.      MDD  Anxiety: Resume PTA duloxetine/trazodone.     Chronic pain syndrome:   - on Norco 5 mg tablets 3 times a day as needed for chronic pain.  She may also be on gabapentin, awaiting formal med rec.  -Resume Norco 5 mg tablets at every 6 hours as  needed     Hypothyroidism: Resume PTA levothyroxine 75 mcg daily.     Malnutrition Diagnosis: Severe malnutrition  In Context of:  Acute illness or injury or chronic illness or disease (etiology unknown at this time)       Diet: Snacks/Supplements Adult: Other; Ok for prn Ensure or [place] per pt request; Between Meals  Combination Diet Soft and Bite Sized Diet (level 6); Thin Liquids (level 0)  Snacks/Supplements Adult: Other; [place] breakfast essentials; With Meals    DVT Prophylaxis: Pneumatic Compression Devices  [place] Catheter: Not present  Lines: None     Cardiac Monitoring: ACTIVE order. Indication: Tachyarrhythmias, acute (48 hours)  Code Status: No CPR- Do NOT Intubate      Clinically Significant Risk Factors              # Hypoalbuminemia: Lowest albumin = 3.4 g/dL at 6/14/2023 10:33 PM, will monitor as appropriate             # Severe Malnutrition: based on nutrition assessment         Disposition Plan      Expected Discharge Date: Pending TCU placement                  Fernando Diaz MD  Hospitalist Service  [facility]  Securely message with [employer] (more info)  Text page via [employer] Paging/Directory   ______________________________________________________________________    Interval History   Patient seen and examined at bedside/chart reviewed.  She is feeling much better today and was pretty excited about her diet upgrade.  VSS, no acute events overnight    Physical Exam   Vital Signs: Temp: 98  F (36.7  C) Temp src: Oral BP: 102/63 Pulse: 80   Resp: 18 SpO2: 99 % O2 Device: None (Room air)    Weight: 120 lbs 3.2 oz    GEN:  Alert, oriented x 3, appears comfortable, NAD.  HEENT:  Normocephalic/atraumatic, no scleral icterus, no nasal discharge, mouth moist.  CV:  Regular rate and rhythm, ENDY ii/vi LSB. No  JVD.  S1 + S2 noted, no S3 or S4.  LUNGS:  Clear to auscultation bilaterally without rales/rhonchi/wheezing/retractions.  Symmetric chest rise on inhalation noted.  ABD:  Active  bowel sounds, soft, non-tender/non-distended.  No rebound/guarding/rigidity.  EXT:  No edema or cyanosis.  No joint synovitis noted.  SKIN:  Dry to touch, no exanthems noted in the visualized areas.    Medical Decision Making       35 MINUTES SPENT BY ME on the date of service doing chart review, history, exam, documentation & further activities per the note.      Data   NOTE: Data reviewed over the past 24 hrs contributes toward MDM complexity

## 2023-06-19 NOTE — PROGRESS NOTES
Care Management Follow Up    Length of Stay (days): 4    Expected Discharge Date: 06/20/2023     Concerns to be Addressed: all concerns addressed in this encounter     Patient plan of care discussed at interdisciplinary rounds: Yes    Anticipated Discharge Disposition:  TCU     Anticipated Discharge Services:    Anticipated Discharge DME:      Patient/family educated on Medicare website which has current facility and service quality ratings:  yes  Education Provided on the Discharge Plan:no    Patient/Family in Agreement with the Plan: yes     Referrals Placed by CM/SW: Post Acute Facilities   Private pay costs discussed: private room/amenity fees    Additional Information:  Case Management met with patient at the bedside Therapies recommend TCU at discharge. Patient in agreement. CM reviewed Medicare.gov website with patient. She has chosen [facility], [facility], [facility] and [facility]. She would like a shared room. Referrals were placed.  CM will monitor for TCU placement.    Tiana Edwards, RN, BSN, CM  Inpatient Care Coordination  [facility]  [phone]

## 2023-06-19 NOTE — PROGRESS NOTES
SPIRITUAL HEALTH SERVICES Progress Note  MS 3        Rev. Aliyah Aguilar M.Div.  Staff   Phone  [phone]

## 2023-06-19 NOTE — PROVIDER NOTIFICATION
1637: Notified provider that patient is c/o new onset of pain on left side of neck that radiates down to shoulder. MD aware that patient declined any pain medication.

## 2023-06-19 NOTE — PROGRESS NOTES
SPIRITUAL HEALTH SERVICES Progress Note  MS 3    Met with patient regarding advance directive consult request.    Explained all materials and answered patient's questions.    She would like to discuss the matter with her daughter-in-law; whom she plans to name as her healthcare agent.    No additional needs.  [facility]S remains available.      Rev. Aliyah Aguilar M.Div.  Staff   Phone  [phone]

## 2023-06-19 NOTE — PLAN OF CARE
Goal Outcome Evaluation:      Plan of Care Reviewed With: patient    Overall Patient Progress: no changeOverall Patient Progress: no change     Temp: 97.5  F (36.4  C) Temp src: Oral BP: (!) 84/52 Pulse: 74   Resp: 20 SpO2: 97 % O2 Device: None (Room air)       A/O4. Up A1 walker/gait belt. Tele SR, ST depression. PRN oxycodone given for abd pain. PRN senna given. Up in chair for dinner. Intermittent nausea, denies intervention. PIV SL. Bed alarm on. Video swallow test Monday.

## 2023-06-19 NOTE — PROGRESS NOTES
"VIDEO SWALLOW STUDY       06/19/23 0944   Appointment Info   Signing Clinician's Name / Credentials (SLP) Nieves Villalobos M.A., CCC-SLP, [facility]   General Information   Onset of Illness/Injury or Date of Surgery 06/14/23   Referring Physician Kevin Mckeon MD   Patient/Family Therapy Goal Statement (SLP) To eat/drink without difficulty   Pertinent History of Current Problem The pt is a 78 year old female with PMH including CAD s/p LAD stent, obesity s/p gastric bypass surgery, gastric ulcer, large ventral hernia, MDD, HLD, anxiety, and 50-year smoking history who quit several weeks ago who presents with shortness of breath and dysphagia. Per MD note: \"Months of dysphagia more with solids than liquids.  Food feels like he gets stuck in her esophagus and she has a lot of dry heaving regurgitation after this.  Denies any significant heartburn symptoms.  Has had previous gastric ulcer and takes daily 40 mg Protonix.  Has also had previous gastric bypass surgery.  Has lost 50 pounds over the last 3 months due to decreased appetite and swallowing issues.  EGD was unremarkable during this admission.   General Observations Patient is alert, expressing concern re: barium consumption and risk of vomiting during study, hx of vomiting with barium studies.   Type of Evaluation   Type of Evaluation Swallow Evaluation   Dentition (Oral Motor)   Comment, Dentition (Oral Motor) Lower edentulous, does chew solids without lower dentition   Dental Appliance/Denture (Oral Motor) upper   General Swallowing Observations   Swallowing Evaluation Videofluoroscopic swallow study (VFSS)   VFSS Evaluation   Radiologist Dr. Oliva   Views Taken left lateral;A/P   VFSS Textures Trialed thin liquids;pureed;solid foods   VFSS Eval: Thin Liquid Texture Trial   Mode of Presentation, Thin Liquid self-fed;straw   Order of Presentation 1, 2, 6   Preparatory Phase poor bolus control   Oral Phase, Thin Liquid premature pharyngeal entry   Bolus Location " When Swallow Triggered valleculae   Pharyngeal Phase, Thin Liquid WFL   Rosenbek's Penetration Aspiration Scale: Thin Liquid Trial Results 2 - contrast enters airway, remains above the vocal cords, no residue remains (penetration)   Diagnostic Statement Unable to test compensations due to limited sensory tolerance, no aspiration by straw though   VFSS Evaluation: Puree Solid Texture Trial   Mode of Presentation, Puree spoon;self-fed   Order of Presentation 3   Preparatory Phase WFL   Oral Phase, Puree premature pharyngeal entry   Bolus Location When Swallow Triggered valleculae   Pharyngeal Phase, Puree WFL   Rosenbek's Penetration Aspiration Scale: Puree Food Trial Results 1 - no aspiration, contrast does not enter airway   Diagnostic Statement Gag with regurgitation of bolus before pharyngeal phase   VFSS Evaluation: Solid Food Texture Trial   Mode of Presentation, Solid spoon;self-fed   Order of Presentation 4, 5, 7   Preparatory Phase prolonged bolus preparation   Oral Phase, Solid premature pharyngeal entry   Bolus Location When Swallow Triggered valleculae   Pharyngeal Phase, Solid WFL   Rosenbek's Penetration Aspiration Scale: Solid Food Trial Results 1 - no aspiration, contrast does not enter airway   Strategies and Compensations liquid wash   Diagnostic Statement Esophageal stasis, total pharyngeal clearance   Esophageal Phase of Swallow   Patient reports or presents with symptoms of esophageal dysphagia Yes   Esophageal sweep performed during today s vidofluoroscopic exam  Yes;Other (comments)  (Limited per radiologist discretion and positioning barriers)   Esophageal comments Residue/stasis at C7-T1, screen positive for impaired esophageal clearance   Swallowing Recommendations   Diet Consistency Recommendations thin liquids (level 0);soft & bite-sized (level 6)   Supervision Level for Intake patient independent   Mode of Delivery Recommendations bolus size, small   Swallowing Maneuver Recommendations  alternate food and liquid intake   Recommended Feeding/Eating Techniques (Swallow Eval) maintain upright sitting position for eating;maintain upright posture during/after eating for 30 minutes;moisten oral mucosa prior to intake   Medication Administration Recommendations, Swallowing (SLP) Whole pills with puree carrier or liquids per preference   Clinical Impression   Criteria for Skilled Therapeutic Interventions Met (SLP Eval) Yes, treatment indicated   SLP Diagnosis Mild oropharyngeal dysphagia   Risks & Benefits of therapy have been explained evaluation/treatment results reviewed;patient   Clinical Impression Comments Oropharygeal dysphagia characterized by premature pharyngeal entry, penetration of thin liquids without aspiration, and impaired mastication and oral prep phase due to limited dentition. Residue/stasis at C7-T1, screen positive for impaired esophageal clearance   SLP Total Evaluation Time   Evaluation, videofluoroscopic eval of swallow function Minutes (59736) 30   SLP Goals   Therapy Frequency (SLP Eval) 2 times/wk   SLP Predicted Duration/Target Date for Goal Attainment 06/26/23   SLP Goals Swallow   SLP: Safely tolerate diet without signs/symptoms of aspiration Soft & bite sized diet;Thin liquids;With use of swallow precautions;With use of compensatory swallow strategies;Independently   Interventions   Interventions Quick Adds Swallowing Dysfunction   Swallowing Dysfunction &/or Oral Function for Feeding   Treatment of Swallowing Dysfunction &/or Oral Function for Feeding Minutes (06223) 10   Symptoms Noted During/After Treatment None   Treatment Detail/Skilled Intervention Patient trained re: effect of suspected esophageal dysmotility on both sensation and on oropharyngeal swallow function.  Trained diet modifications and safe swallowing strategies with patient able to verbalize comprehension of 3/3 strategies with minimal verbal and video training today.   SLP Discharge Planning   SLP Plan  Meal follow up and strategy training x1   SLP Discharge Recommendation home  (Per swallowing needs)   SLP Rationale for DC Rec Will likely meet swallowing goals by discharge.   SLP Brief overview of current status  Video swallow study indicating flash penetration of thin liquids without aspiration, oral prep impairment due to limited dentition, but no pharyngeal dysphagia with solids.  Recommend upgrade to soft and bite size diet with thin liquids using the following strategies: upright during and after intake, chew well, alternate textures.  Recommend consider GI follow up for further esophageal motility testing and potential intervention for this solid food dysmotility screened positive in video swallow.   Total Session Time   Total Session Time (sum of timed and untimed services) 40

## 2023-06-19 NOTE — PLAN OF CARE
Nursing Summary:    [id number]    Pt is A+OX4. PRN trazodone and oxycodone administered during this shift. Pt is on RA. Pt is 1 ass with gait belt and walker. Tele: SR.    Plan: swallow test today.    /59 (BP Location: Left arm)   Pulse 75   Temp 97.9  F (36.6  C) (Oral)   Resp 18   Ht 1.524 m (5')   Wt 54.5 kg (120 lb 3.2 oz)   SpO2 96%   BMI 23.47 kg/m

## 2023-06-19 NOTE — PROGRESS NOTES
Cardiology Progress Note  Kelly PADILLA, CNP          Assessment and Plan:     Admit (6/14/23) 3 wks progressive dyspnea, poor oral intake from dysphagia, positional lightheadedness, chest pain, weight loss, urinary retention  Evidence of hypotension, NSTEMI, acute heart failure    PMH:  Coronary artery disease, obesity s/p gastric bypass surger, large ventral hernia, hyperlipidemia, anxiety, recently quit smoking, prior gastric ulcer    Stress-Induced Cardiomyopathy with LVOTO  -BNP: 25,000  -LVEF 30-35% with basal hypokinesis with proximal septal thickening  -cath (6/15/23) showed no significant CAD  -weights stable  -unable to use beta-blockers, ACE-inhibitor now due to low BPs and LVOTO  -consider repeat ECHO prior to discharge    Coronary Artery Disease:  -hx of LAD stenting (2001)  -cath (6/15/23) showed mild-moderate LAD in-stent restenosis non flow-limiting   -no CP overnight    Hypotension:  -likely related to LVOTO  -BP improved with midodrine started  (SBP  mmHg)    Dysphagia:  -video swallow study today  -normal upper GI    Tobacco Dependence  -recently quit, encouraged continued cessation    Hyperlipidemia:  -  Atorvastatin has been increased  -repeat lipids in 2 months    Suspected COPD  Chronic pain syndrome  hypothyroidism               Interval History:     Getting oxycodone for her chronic back pain  Denies chest pain  Ongoing dyspnea when walking to BR  No palpitations  Able to eat some eggs this am                Medications:       aspirin  81 mg Oral Daily     atorvastatin  40 mg Oral QPM     DULoxetine  60 mg Oral BID     levothyroxine  75 mcg Oral Daily     midodrine  2.5 mg Oral TID w/meals     multivitamin w/minerals  1 tablet Oral Daily     pantoprazole  40 mg Oral Daily     sodium chloride (PF)  3 mL Intracatheter Q8H     sodium chloride (PF)  3 mL Intracatheter Q8H            Physical Exam:   Blood pressure 95/75, pulse 98, temperature 97.9  F (36.6  C), temperature source  Oral, resp. rate 20, height 1.524 m (5'), weight 54.5 kg (120 lb 3.2 oz), SpO2 97 %.  Wt Readings from Last 3 Encounters:   06/19/23 54.5 kg (120 lb 3.2 oz)   09/30/21 60.1 kg (132 lb 9.6 oz)   09/22/21 59 kg (130 lb)     I/O last 3 completed shifts:  In: 503 [P.O.:500; I.V.:3]  Out: -     CONST:  Alert and oriented  LUNGS:  CTA bilaterally  CARDIO:  RRR, S1, S2  II/VI systolic murmur  ABD:  thin  EXT:  No edema  1+ DP bilaterally           Data:   TELE:  SR/ST    CBC  Recent Labs   Lab 06/18/23  0556 06/15/23  0728   WBC 4.7 6.7   HGB 11.5* 11.4*    258       BMP  Recent Labs   Lab 06/18/23  0556 06/15/23  2215 06/15/23  0728 06/14/23 2233     --  139 136   POTASSIUM 4.1  --  4.0 4.5   CHLORIDE 106  --  110* 102   SUKUMAR 8.4*  --  8.3* 9.0   CO2 24  --  18* 23   BUN 7.9*  --  16.4 22.0   CR 0.47*  --  0.52 0.59   GLC 99 88 104* 93     Recent Labs   Lab Test 06/15/23  0728 09/23/21  0802 06/26/15  1106   CHOL 187 207* 185   HDL 48* 41* 38*   * 138* 124   TRIG 106 142 114   CHOLHDLRATIO  --   --  4.9       TROP  Lab Results   Component Value Date    TROPI <0.012 09/05/2013    TROPI 0.896 () 08/10/2013    TROPI 1.460 () 08/09/2013    TROPI 2.560 () 08/09/2013    TROPONIN 2.20 () 08/09/2013       BNP  Recent Labs   Lab 06/14/23  2233   NTBNPI 25,161*

## 2023-06-20 NOTE — PLAN OF CARE
"Speech Language Therapy Discharge Summary    Reason for therapy discharge:    All goals and outcomes met, no further needs identified.    Progress towards therapy goal(s). See goals on Care Plan in [facility] electronic health record for goal details.  Goals met    Therapy recommendation(s):    No further therapy is recommended. Video swallow study indicating flash penetration of thin liquids without aspiration, oral prep impairment due to limited dentition, but no pharyngeal dysphagia with solids.  Recommend advance to dental/mechanical soft diet with thin liquids using the following strategies: upright during and after intake, chew well, alternate textures. From an oropharyngeal perspective, pt ok to advance diet to regular solids, however will continue softer diet per pt preference given dentition. Per VFSS results: \"Recommend consider GI follow up for further esophageal motility testing and potential intervention for this solid food dysmotility screened positive in video swallow.\"    "

## 2023-06-20 NOTE — PROGRESS NOTES
[facility]    Medicine Progress Note - Hospitalist Service    Date of Admission:  6/14/2023    Assessment & Plan   Sera Figueroa is a 78 year old female with PMH including CAD s/p LAD stent, obesity s/p gastric bypass surgery, gastric ulcer, large ventral hernia, MDD, HLD, anxiety, and 50-year smoking history who quit several weeks ago who presents with shortness of breath and dysphagia.     Assessment and Plan:   Stress CM ( second time in her life).  Patient report of being under terrible stress in the last 12-18 months, lost of many family members including her , sister and daughter.  Coronary angiogram proves a stress cardiomyopathy. LV EF 30-35 %  Severely elevated LV end-diastolic blood pressure contributing to HFrEF   Dynamic LVOT obstruction.  Chest pain, dyspnea secondary to the above  NSTEMI secondary to the above   Orthostatic hypotension secondary to the above  History of CAD.  Stent patency, not significant arthrosclerotic disease     Presenting with 3 weeks of shortness of breath that is worse with exertion associate with lightheadedness when getting up and intermittent chest tightness both at rest and with exertion.  She does not have any lower extremity edema.  No previous history of heart failure although she has had 2 stents placed in the past including into the LAD.  Most recent angiogram was from 2013 and shows some in-stent stenosis and other moderate coronary disease.  NT proBNP is elevated at 25,000 and her troponin T is elevated at 114.  D-dimer undetectable.  EKG shows NSR with significant T wave inversions in leads V3-V6 that is new.  Blood pressure was 75/60 in the ER that responded to 1 L of NS and is 95/52.  She does take an 81 mg daily aspirin.  Suspect she has had worsening of her coronary disease resulting in angina symptoms and now NSTEMI with elevated troponin.  Thinks hypotension is from hypovolemia secondary to her dysphagia as below.  -Cardiology  consulted, recommendation appreciated.  -TTE 6/19 EF of 50-55 percent with grade 1 diastolic dysfunction   -[facility] 6/15/2025 without any significant CAD  -Serial troponin T level completed  -Cardiac monitoring  -Cardiac diet  -Continue PTA 81 mg aspirin (received 325 mg aspirin)  -Lipid panel completed  -No beta-blocker or ACE inhibitor due to hypotension.   - continue midodrine, dose increased to 5 mg TID      Tobacco dependence  Suspected COPD: Scheduled for COPD testing next week, likely PFTs.  She smoked for 50 years and quit several weeks ago.  No wheezing on exam now.  Has had shortness of breath for the past 3 weeks as above.     Dysphagia  Unintentional weight loss  History gastric bypass  History gastric ulcer: Months of dysphagia more with solids than liquids.  Food feels like he gets stuck in her esophagus and she has a lot of dry heaving regurgitation after this.  Denies any significant heartburn symptoms.  Has had previous gastric ulcer and takes daily 40 mg Protonix.  Has also had previous gastric bypass surgery.  Has lost 50 pounds over the last 3 months due to decreased appetite and swallowing issues.  Esophageal cancer ruled out by EGD.  Denies any melena or hematochezia.  -Video swallow 6/19 with flash penetration of thin liquids no jerry aspiration, SLP recs soft bite-size diet with thin liquids appreciated  -Consult dietitian. Done  -Continue 40 mg daily Protonix     Tobacco dependence  Suspected COPD: She reports she is supposed to have COPD testing next week, suspect is PFTs.  She smoked for 50 years and quit several weeks ago.  No wheezing on exam now.  Has had shortness of breath for the past 3 weeks as above.  Normal O2 sat RA.      MDD  Anxiety: Resume PTA duloxetine/trazodone.     Chronic pain syndrome:   - on Norco 5 mg tablets 3 times a day as needed for chronic pain.  She may also be on gabapentin, awaiting formal med rec.  -Resume Norco 5 mg tablets at every 6 hours as  needed     Hypothyroidism: Resume PTA levothyroxine 75 mcg daily.     Malnutrition Diagnosis: Severe malnutrition  In Context of:  Acute illness or injury or chronic illness or disease (etiology unknown at this time)       Diet: Snacks/Supplements Adult: Other; Ok for prn Ensure or Coamo per pt request; Between Meals  Snacks/Supplements Adult: Other; Coamo breakfast essentials; With Meals  Combination Diet Easy to Chew (level 7); Thin Liquids (level 0)    DVT Prophylaxis: Pneumatic Compression Devices  [place]ado Catheter: Not present  Lines: None     Cardiac Monitoring: ACTIVE order. Indication: Tachyarrhythmias, acute (48 hours)  Code Status: No CPR- Do NOT Intubate      Clinically Significant Risk Factors              # Hypoalbuminemia: Lowest albumin = 3.4 g/dL at 6/14/2023 10:33 PM, will monitor as appropriate             # Severe Malnutrition: based on nutrition assessment         Disposition Plan      Expected Discharge Date: 6/21/2023                Fernando Diaz MD  Hospitalist Service  [facility] [place]  Securely message with [employer] (more info)  Text page via [employer] Paging/Directory   ______________________________________________________________________    Interval History   Patient seen and examined at bedside/chart reviewed.  No complaints today feels pretty good this morning.  Denies any lightheadedness or dizziness.    Physical Exam   Vital Signs: Temp: 98.4  F (36.9  C) Temp src: Oral BP: 100/57 Pulse: 78   Resp: 16 SpO2: 96 % O2 Device: None (Room air)    Weight: 121 lbs 14.4 oz chaperone nurse Katalina    GEN:  Alert, oriented x 3, appears comfortable, NAD.  HEENT:  Normocephalic/atraumatic, no scleral icterus, no nasal discharge, mouth moist.  CV:  Regular rate and rhythm, ENDY ii/vi LSB. No  JVD.  S1 + S2 noted, no S3 or S4.  LUNGS:  Clear to auscultation bilaterally without rales/rhonchi/wheezing/retractions.  Symmetric chest rise on inhalation noted.  ABD:  Active bowel sounds, soft,  non-tender/non-distended.  No rebound/guarding/rigidity.  EXT:  No edema or cyanosis.  No joint synovitis noted.  SKIN:  Dry to touch, no exanthems noted in the visualized areas.    Medical Decision Making       35 MINUTES SPENT BY ME on the date of service doing chart review, history, exam, documentation & further activities per the note.      Data   NOTE: Data reviewed over the past 24 hrs contributes toward MDM complexity

## 2023-06-20 NOTE — PLAN OF CARE
Pt is alert and oriented x4. Pt rated pain  8/10 was given Oxycodone and Trazodone for sleep. Pt is on Tele monitoring SR. Pt has redness on her abdominal folds. Pt denies nausea or vomiting. Pt BP is low. Pt denies dizziness. Pt ambulates with SBA, gait belt and walker. Plan to Discharge to TCU. Pt is sleeping in bed. Bed alarm in place and call light within reach. will continue to monitor and assess pt.

## 2023-06-20 NOTE — PLAN OF CARE
"14:26 Shift Summary: Pt alert and oriented.  Has c/o abdominal pain and chronic back pain. PRN oxycodone given.  Alert and oriented.  Ambulates with SBA, gait belt and walker.  Lungs diminished, clear.  Tele: SR. Diet texture advanced to \"easy to chew\". Voiding without difficulty. PIV saline locked.  Cardiology, Speech, PT following. Discharge to TCU, [facility], tomorrow.                 "

## 2023-06-20 NOTE — PROGRESS NOTES
Cardiology Progress Note  Kelly PADILLA, CNP          Assessment and Plan:     Admit (6/14/23) 3 wks progressive dyspnea, poor oral intake from dysphagia, positional lightheadedness, chest pain, weight loss, urinary retention  Evidence of hypotension, NSTEMI, acute heart failure    PMH:  Coronary artery disease, obesity s/p gastric bypass surger, large ventral hernia, hyperlipidemia, anxiety, recently quit smoking, prior gastric ulcer    Stress-Induced Cardiomyopathy with LVOTO  -BNP: 25,000  -ECHO yesterday showed improved LVEF to 50-55% from 35% with ongoing severe hypokinesis in mid-distal septum. LVOT gradient present due to sigmoid septum and hyperdynamic gradient  Peak gradient  mmhg with and without Valsalva  -cath (6/15/23) showed no significant CAD  -weights stable  -unable to use beta-blockers, ACE-inhibitor now due to low BPs and LVOTO      Coronary Artery Disease:  -hx of LAD stenting (2001)  -cath (6/15/23) showed mild-moderate LAD in-stent restenosis non flow-limiting   -no CP overnight    Hypotension:  -likely related to LVOTO and hypovolemia  -BP improved with midodrine started  (SBP  mmHg)    Dysphagia:  -penetration of thin liquids on video swallow  -normal upper GI    Tobacco Dependence  -recently quit, encouraged continued cessation    Hyperlipidemia:  -  Atorvastatin has been increased  -repeat lipids in 2 months    Suspected COPD  Chronic pain syndrome  hypothyroidism               Interval History:     Better energy and less lightheadedness  No CP/SOB  Improved eating                Medications:       aspirin  81 mg Oral Daily     atorvastatin  40 mg Oral QPM     DULoxetine  60 mg Oral BID     levothyroxine  75 mcg Oral Daily     midodrine  2.5 mg Oral TID w/meals     multivitamin w/minerals  1 tablet Oral Daily     pantoprazole  40 mg Oral Daily     sodium chloride (PF)  3 mL Intracatheter Q8H            Physical Exam:   Blood pressure (!) 89/42, pulse 85, temperature  97.6  F (36.4  C), temperature source Oral, resp. rate 14, height 1.524 m (5'), weight 55.3 kg (121 lb 14.4 oz), SpO2 93 %.  Wt Readings from Last 3 Encounters:   06/20/23 55.3 kg (121 lb 14.4 oz)   09/30/21 60.1 kg (132 lb 9.6 oz)   09/22/21 59 kg (130 lb)     I/O last 3 completed shifts:  In: 490 [P.O.:490]  Out: -     CONST:  Alert and oriented  LUNGS:  CTA bilaterally  CARDIO:  RRR, S1, S2  II/VI systolic murmur  ABD:  thin  EXT:  No edema  1+ DP bilaterally           Data:   TELE:  SR/ST    CBC  Recent Labs   Lab 06/18/23  0556 06/15/23  0728   WBC 4.7 6.7   HGB 11.5* 11.4*    258       BMP  Recent Labs   Lab 06/18/23  0556 06/15/23  2215 06/15/23  0728 06/14/23 2233     --  139 136   POTASSIUM 4.1  --  4.0 4.5   CHLORIDE 106  --  110* 102   SUKUMAR 8.4*  --  8.3* 9.0   CO2 24  --  18* 23   BUN 7.9*  --  16.4 22.0   CR 0.47*  --  0.52 0.59   GLC 99 88 104* 93     Recent Labs   Lab Test 06/15/23  0728 09/23/21  0802 06/26/15  1106   CHOL 187 207* 185   HDL 48* 41* 38*   * 138* 124   TRIG 106 142 114   CHOLHDLRATIO  --   --  4.9       TROP  Lab Results   Component Value Date    TROPI <0.012 09/05/2013    TROPI 0.896 () 08/10/2013    TROPI 1.460 () 08/09/2013    TROPI 2.560 () 08/09/2013    TROPONIN 2.20 () 08/09/2013       BNP  Recent Labs   Lab 06/14/23 2233   NTBNPI 25,161*

## 2023-06-20 NOTE — PLAN OF CARE
"Goal Outcome Evaluation:      Plan of Care Reviewed With: patient    Overall Patient Progress: no change    Outcome Evaluation: .    Patient calm and cooperative. A/O x4. VS stable, with soft BP's. On RA, sats maintaining above 90%. Tele is SR. Diet increased to soft bite size and thin liquids. Pt c/o pain in neck on L side (new onset) - notified MD and is aware. Pain also reported 8/10 in abdomen and back, oxycodone 10mg given and improved. Nausea with dry heaving , gas pains and dizziness present, zofran given and ambulation helped with gas pains. Nausea did decrease. Rechecked BP for dizziness and BP was WDL per baseline, dizziness did decrease. Speech, Nutrition, PT and cardiology are following.     Problem: Plan of Care - These are the overarching goals to be used throughout the patient stay.    Goal: Plan of Care Review  Description: The Plan of Care Review/Shift note should be completed every shift.  The Outcome Evaluation is a brief statement about your assessment that the patient is improving, declining, or no change.  This information will be displayed automatically on your shift note.  Outcome: Progressing  Flowsheets (Taken 6/19/2023 2142)  Outcome Evaluation: .  Plan of Care Reviewed With: patient  Overall Patient Progress: no change  Goal: Patient-Specific Goal (Individualized)  Description: You can add care plan individualizations to a care plan. Examples of Individualization might be:  \"Parent requests to be called daily at 9am for status\", \"I have a hard time hearing out of my right ear\", or \"Do not touch me to wake me up as it startles me\".  Outcome: Progressing  Goal: Absence of Hospital-Acquired Illness or Injury  Outcome: Progressing  Intervention: Identify and Manage Fall Risk  Recent Flowsheet Documentation  Taken 6/19/2023 7355 by Myra Yao, RN  Safety Promotion/Fall Prevention:    assistive device/personal items within reach    safety round/check completed  Intervention: Prevent " and Manage VTE (Venous Thromboembolism) Risk  Recent Flowsheet Documentation  Taken 6/19/2023 1558 by Myra Yao RN  VTE Prevention/Management: SCDs (sequential compression devices) off  Goal: Optimal Comfort and Wellbeing  Outcome: Progressing  Intervention: Monitor Pain and Promote Comfort  Recent Flowsheet Documentation  Taken 6/19/2023 1822 by Myra Yao RN  Pain Management Interventions: ambulation/increased activity  Taken 6/19/2023 1731 by Myra Yao RN  Pain Management Interventions: medication (see MAR)  Taken 6/19/2023 1557 by Myra Yao RN  Pain Management Interventions: declines  Goal: Readiness for Transition of Care  Outcome: Progressing     Problem: Malnutrition  Goal: Improved Nutritional Intake  Outcome: Progressing     Problem: Pain Chronic (Persistent)  Goal: Optimal Pain Control and Function  Outcome: Progressing  Intervention: Develop Pain Management Plan  Recent Flowsheet Documentation  Taken 6/19/2023 1822 by Myra Yao RN  Pain Management Interventions: ambulation/increased activity  Taken 6/19/2023 1731 by Myra Yao RN  Pain Management Interventions: medication (see MAR)  Taken 6/19/2023 1557 by Myra Yao RN  Pain Management Interventions: declines  Intervention: Manage Persistent Pain  Recent Flowsheet Documentation  Taken 6/19/2023 1558 by Myra Yao RN  Medication Review/Management: medications reviewed     Problem: Nausea and Vomiting  Goal: Nausea and Vomiting Relief  Outcome: Progressing  Intervention: Prevent and Manage Nausea and Vomiting  Recent Flowsheet Documentation  Taken 6/19/2023 1732 by Myra Yao RN  Nausea/Vomiting Interventions:    antiemetic    slow deep breathing encouraged     Problem: Cardiac Output Decreased  Goal: Effective Cardiac Output  Outcome: Progressing  Intervention: Optimize Cardiac Output  Recent Flowsheet Documentation  Taken 6/19/2023 1558 by Myra Yao  RN  VTE Prevention/Management: SCDs (sequential compression devices) off     Problem: Oral Intake Inadequate  Goal: Improved Oral Intake  Outcome: Progressing

## 2023-06-20 NOTE — PROGRESS NOTES
Care Management Follow Up    Length of Stay (days): 5    Expected Discharge Date: 06/20/2023     Concerns to be Addressed: all concerns addressed in this encounter     Patient plan of care discussed at interdisciplinary rounds: Yes    Anticipated Discharge Disposition:  TCU     Anticipated Discharge Services:  rehab  Anticipated Discharge DME:      Patient/family educated on Medicare website which has current facility and service quality ratings:  Was done  Education Provided on the Discharge Plan:  no  Patient/Family in Agreement with the Plan:  yes    Referrals Placed by CM/SW:  Was sone  Private pay costs discussed: Not applicable    Additional Information:  Patient was accepted by [facility]. CM sent message to MD to determine if medically ready. Will let patient know and verify if needs transportation.  [id number] completed.  CM waiting for medical clearance to proceed.    Addendum 1045  [facility] bed is now not available until tomorrow at 1 pm. MD, patient and staff RN notified. Patient is checking with her friend if she can give her a ride to [facility] tomorrow at 1 pm.    Addendum 1300  Patient's friend will provide a ride to the TCU at 1 pm tomorrow to [facility].      Tiana Edwards, RN, BSN, CM  Inpatient Care Coordination  [facility]  [phone]

## 2023-06-21 NOTE — PROGRESS NOTES
CLINICAL NUTRITION SERVICES - REASSESSMENT NOTE    Recommendations Ordered by Registered Dietitian (RD):   Diet advancement per SLP   Continue oral nutritional supplements as ordered   Continue MVI+M as ordered    Malnutrition:   % Weight Loss:  > 20% in 1 year (severe malnutrition)  % Intake:  </= 75% for >/= 1 month (severe malnutrition)  Subcutaneous Fat Loss:  Orbital region moderate depletion and Upper arm region severe depletion  Muscle Loss:  Temporal region moderate to severe depletion, Clavicle bone region moderate to severe depletion, Acromion bone region moderate depletion, Patellar region moderate to severe depletion, Anterior thigh region moderate to severe depletion and Posterior calf region moderate to severe depletion  Fluid Retention: None documented     Malnutrition Diagnosis: Severe malnutrition  In Context of:  Acute illness or injury or chronic illness or disease (etiology unknown at this time)     EVALUATION OF PROGRESS TOWARD GOALS   Diet: Easy to Chew + Thin Liquids + [place] Breakfast Essentials with meals TID     Intake/Tolerance:  Upon review of the flowsheets, PO intake has significantly improved since 6/18 with diet advancement (working with SLP). Throughout admission patient is consuming % of meals ordered. For the past ~3 days she has been consuming % of meals ordered. Ordering 2-3 meals per day.     Patient notes that her appetite is improved during admission. Enjoys [place] breakfast essentials. Some confusion with diet order, to clarify this RD called  and printed off easy to chew menu. Patient plans to order lunch.     ASSESSED NUTRITION NEEDS:  Dosing Weight 55 kg   Estimated Energy Needs: >/=1375 kcals (>/=25 Kcal/Kg)  Justification: repletion  Estimated Protein Needs: >/=66 grams protein - (>/=1.2 g pro/Kg)  Justification: Repletion and preservation of lean body mass  Estimated Fluid Needs: per MD    NEW FINDINGS:   - SLP following   - Cardiology  following   - GI following - underwent EGD on 6/16, biopsied for EoE  - labs:  Labs:  Electrolytes  Potassium (mmol/L)   Date Value   06/18/2023 4.1   06/15/2023 4.0   06/14/2023 4.5   11/22/2021 4.2   09/22/2021 4.0   10/03/2016 4.1   10/02/2016 3.6   06/27/2016 4.7    Blood Glucose  Glucose (mg/dL)   Date Value   06/18/2023 99   06/15/2023 104 (H)   06/14/2023 93   11/22/2021 101 (H)   09/22/2021 86   10/03/2016 101 (H)   10/02/2016 119 (H)   06/27/2016 90   06/26/2015 95   03/09/2015 99     GLUCOSE BY METER POCT (mg/dL)   Date Value   06/15/2023 88    Inflammatory Markers  WBC (10e9/L)   Date Value   10/02/2016 9.1   06/27/2016 7.5   09/05/2013 6.5     WBC Count (10e3/uL)   Date Value   06/18/2023 4.7   06/15/2023 6.7   06/14/2023 7.4     Albumin (g/dL)   Date Value   06/14/2023 3.4 (L)   11/22/2021 3.3 (L)   09/22/2021 3.4   10/03/2016 3.2 (L)      Sodium (mmol/L)   Date Value   06/18/2023 137   06/15/2023 139   06/14/2023 136   10/03/2016 141   10/02/2016 140   06/27/2016 143    Renal  Urea Nitrogen (mg/dL)   Date Value   06/18/2023 7.9 (L)   06/15/2023 16.4   06/14/2023 22.0   11/22/2021 23   09/22/2021 14   10/03/2016 14   10/02/2016 15   06/27/2016 15     Creatinine (mg/dL)   Date Value   06/18/2023 0.47 (L)   06/15/2023 0.52   06/14/2023 0.59   10/03/2016 0.58   10/02/2016 0.67   06/27/2016 0.57     Additional  Triglycerides (mg/dL)   Date Value   06/15/2023 106   09/23/2021 142   06/26/2015 114   03/09/2015 115   08/10/2013 176 (H)     Ketones Urine (mg/dL)   Date Value   11/22/2021 Negative   10/04/2016 Negative        - medications:    aspirin  81 mg Oral Daily     atorvastatin  40 mg Oral QPM     DULoxetine  60 mg Oral BID     levothyroxine  75 mcg Oral Daily     midodrine  10 mg Oral TID w/meals     multivitamin w/minerals  1 tablet Oral Daily     pantoprazole  40 mg Oral Daily     sodium chloride (PF)  3 mL Intracatheter Q8H        - MEDICATION INSTRUCTIONS -       - MEDICATION INSTRUCTIONS -       -  MEDICATION INSTRUCTIONS -       ACE/ARB/ARNI NOT PRESCRIBED       BETA BLOCKER NOT PRESCRIBED       STATIN NOT PRESCRIBED        acetaminophen **OR** acetaminophen, calcium carbonate, - MEDICATION INSTRUCTIONS -, diphenhydrAMINE, HOLD MEDICATION, HYDROcodone-acetaminophen, hydrOXYzine, lidocaine 4%, lidocaine (buffered or not buffered), meclizine, - MEDICATION INSTRUCTIONS -, melatonin, naloxone, naloxone, naloxone, naloxone, ondansetron **OR** ondansetron, oxyCODONE **OR** oxyCODONE, - MEDICATION INSTRUCTIONS -, polyethylene glycol, ACE/ARB/ARNI NOT PRESCRIBED, BETA BLOCKER NOT PRESCRIBED, STATIN NOT PRESCRIBED, senna-docusate **OR** senna-docusate, sodium chloride (PF), sodium chloride (PF), traZODone     - weight: no weight loss   Vitals:    06/14/23 2146 06/16/23 0559 06/17/23 0342 06/18/23 0442   Weight: 52.2 kg (115 lb) 54.8 kg (120 lb 14.4 oz) 55.6 kg (122 lb 9.6 oz) 54.9 kg (121 lb 1.6 oz)    06/19/23 0444 06/20/23 0524   Weight: 54.5 kg (120 lb 3.2 oz) 55.3 kg (121 lb 14.4 oz)     Previous Goals:   Diet advancement vs nutrition support consideration w/in 48-72 hrs.  Evaluation: Met    Previous Nutrition Diagnosis:   Malnutrition related to decreased oral intake, dysphagia w/ unclear etiology as evidenced by meeting <75% needs or less for 3 months, severe wt loss, overt fat/muscle loss.  Evaluation: Improving    CURRENT NUTRITION DIAGNOSIS  Malnutrition related to decreased oral intake, dysphagia w/ unclear etiology as evidenced by meeting <75% needs or less for 3 months - improving, severe wt loss, overt fat/muscle loss.    INTERVENTIONS  Recommendations / Nutrition Prescription  Diet advancement per SLP   Continue oral nutritional supplements as ordered   Continue MVI+M as ordered     Implementation  Medical Food Supplement, Multivitamin/Mineral: as above  Collaboration and Referral of Nutrition care: discussed patient with RN     Goals  Patient to consume >/=75% of meals ordered TID or the equivalent with  oral nutritional supplements      MONITORING AND EVALUATION:  Progress towards goals will be monitored and evaluated per protocol and Practice Guidelines    Melissa Ryan RD, LD  Clinical Dietitian     3rd floor/ICU: [phone]  All other floors: [phone]  Weekend/holiday: [phone]  Office: [phone]

## 2023-06-21 NOTE — PROGRESS NOTES
[facility] Cardiology Progress Note            Interval History:   Takotsubo cardiomyopathy.  Ejection fraction has improved up to the 50 to 55% range.  Blood pressure still on the lower side.  On further questioning the patient tells me that her blood pressures are typically in the 80s to 90s.  She does have some dizziness and lightheadedness with this.  She was somewhat dizzy this morning as her blood pressure was on the lower side.  Taking midodrine 5 mg 3 times a day.  Feeling much better than she was when she came in initially.              Medications:       aspirin  81 mg Oral Daily     atorvastatin  40 mg Oral QPM     DULoxetine  60 mg Oral BID     levothyroxine  75 mcg Oral Daily     midodrine  10 mg Oral TID w/meals     multivitamin w/minerals  1 tablet Oral Daily     pantoprazole  40 mg Oral Daily     sodium chloride (PF)  3 mL Intracatheter Q8H               Physical Exam:   Blood pressure (!) 85/43, pulse 73, temperature 97.9  F (36.6  C), temperature source Oral, resp. rate 18, height 1.524 m (5'), weight 55.3 kg (121 lb 14.4 oz), SpO2 96 %.  Rhythm: Sinus rhythmConstitutional:   awake, alert, cooperative, no apparent distress, and appears stated age     Neck:   no jugular venous distension and no carotid bruits     Lungs:   No increased work of breathing, good air exchange, clear to auscultation bilaterally, no crackles or wheezing     Cardiovascular:   regular rate and rhythm, normal S1 and S2, S4 present, murmurs include systolic murmur II/VI located at left sternal border without radiation and no edema               Data:          Lab Results   Component Value Date     06/18/2023     06/15/2023     06/14/2023     10/03/2016     10/02/2016     06/27/2016    Lab Results   Component Value Date    CHLORIDE 106 06/18/2023    CHLORIDE 110 06/15/2023    CHLORIDE 102 06/14/2023    CHLORIDE 112 11/22/2021    CHLORIDE 108 09/22/2021    CHLORIDE 106 10/03/2016     CHLORIDE 106 10/02/2016    CHLORIDE 107 06/27/2016    Lab Results   Component Value Date    BUN 7.9 06/18/2023    BUN 16.4 06/15/2023    BUN 22.0 06/14/2023    BUN 23 11/22/2021    BUN 14 09/22/2021    BUN 14 10/03/2016    BUN 15 10/02/2016    BUN 15 06/27/2016      Lab Results   Component Value Date    POTASSIUM 4.1 06/18/2023    POTASSIUM 4.0 06/15/2023    POTASSIUM 4.5 06/14/2023    POTASSIUM 4.2 11/22/2021    POTASSIUM 4.0 09/22/2021    POTASSIUM 4.1 10/03/2016    POTASSIUM 3.6 10/02/2016    POTASSIUM 4.7 06/27/2016    Lab Results   Component Value Date    CO2 24 06/18/2023    CO2 18 06/15/2023    CO2 23 06/14/2023    CO2 24 11/22/2021    CO2 25 09/22/2021    CO2 30 10/03/2016    CO2 24 10/02/2016    CO2 31 06/27/2016    Lab Results   Component Value Date    CR 0.47 06/18/2023    CR 0.52 06/15/2023    CR 0.59 06/14/2023    CR 0.58 10/03/2016    CR 0.67 10/02/2016    CR 0.57 06/27/2016        Lab Results   Component Value Date    HGB 11.5 (L) 06/18/2023    HGB 11.4 (L) 06/15/2023    HGB 12.2 06/14/2023                   Assessment and Plan:   Assessment:   Problem List    Hypotension due in part to the stress cardiomyopathy with LVOT obstruction.  However, patient tends to run a lower blood pressure anyway.  Takotsubo stress cardiomyopathy which has significantly reversed already.    Dysphagia, unspecified type    * No resolved hospital problems. *       Plan:   1.  We will increase the dose of midodrine to 10 mg 3 times a day.  2.  I have encouraged increased fluid intake which will increase preload and reduce obstruction.       Attestation:  I have reviewed today's vital signs, notes, medications, labs and imaging.  Care coordination / counseling time: 25 minutes  Total time: 35 minutes         Meet Shaw MD, MD 6/21/2023  9:24 AM

## 2023-06-21 NOTE — PLAN OF CARE
Goal Outcome Evaluation:      Plan of Care Reviewed With: patient    Overall Patient Progress: improvingOverall Patient Progress: improving  A&Ox4. VSS ex: soft BP. Tele: SR. SANTIAGOA. Easy to chew diet; changed from soft bite diet this am. States that she feels a little urinary hesitency, but is able to void. PIV removed this am due to being infiltrated. Up in chair most of day. PRN Oxycodone given with relief. Requested senna to help with gas pain. Discharging this afternoon at 1500. Friend to transport to assisted living. 2 hard copy narcotic prescriptions sent with patient. All belongings packed up.

## 2023-06-21 NOTE — PLAN OF CARE
Goal Outcome Evaluation:       PO intake improved during admission, working with SLP.    Melissa Ryan RD, LD  Clinical Dietitian     3rd floor/ICU: [phone]  All other floors: [phone]  Weekend/holiday: [phone]  Office: [phone]

## 2023-06-21 NOTE — DISCHARGE SUMMARY
[facility]  Hospitalist Discharge Summary      Date of Admission:  6/14/2023  Date of Discharge:  6/21/2023  Discharging Provider: Fernando Diaz MD  Discharge Service: Hospitalist Service    Discharge Diagnoses   Takotsubo cardiomyopathy  Dynamic LVOT obstruction  Chest pain  Non-STEMI  Orthostatic hypotension  CAD with history of stenting  Suspect COPD  Tobacco dependence  Unintentional weight loss  History of gastric bypass  History of PUD  Major depressive disorder  Chronic pain syndrome  Hypothyroidism  Severe protein energy malnutrition    Clinically Significant Risk Factors     # Severe Malnutrition: based on nutrition assessment      Follow-ups Needed After Discharge   Follow-up Appointments     Follow Up and recommended labs and tests      Follow up with Nursing home physician.  No follow up labs or test are   needed.            Unresulted Labs Ordered in the Past 30 Days of this Admission     No orders found from 5/15/2023 to 6/15/2023.          Discharge Disposition   Discharged to short-term care facility  Condition at discharge: Stable    Hospital Course     Sera Figueroa is a 78 year old female with PMH including CAD s/p LAD stent, obesity s/p gastric bypass surgery, gastric ulcer, large ventral hernia, MDD, HLD, anxiety, and 50-year smoking history who quit several weeks ago who presents with shortness of breath and dysphagia.     Assessment and Plan:   Stress CM ( second time in her life).  Patient report of being under terrible stress in the last 12-18 months, lost of many family members including her , sister and daughter.  Coronary angiogram proves a stress cardiomyopathy. LV EF 30-35 %  Severely elevated LV end-diastolic blood pressure contributing to HFrEF   Dynamic LVOT obstruction.  Chest pain, dyspnea secondary to the above  NSTEMI secondary to the above   Orthostatic hypotension secondary to the above  History of CAD.  Stent patency, not significant  arthrosclerotic disease     Presenting with 3 weeks of shortness of breath that is worse with exertion associate with lightheadedness when getting up and intermittent chest tightness both at rest and with exertion.  She does not have any lower extremity edema.  No previous history of heart failure although she has had 2 stents placed in the past including into the LAD.  Most recent angiogram was from 2013 and shows some in-stent stenosis and other moderate coronary disease.  NT proBNP is elevated at 25,000 and her troponin T is elevated at 114.  D-dimer undetectable.  EKG shows NSR with significant T wave inversions in leads V3-V6 that is new.  Blood pressure was 75/60 in the ER that responded to 1 L of NS and is 95/52.  She does take an 81 mg daily aspirin.  Suspect she has had worsening of her coronary disease resulting in angina symptoms and now NSTEMI with elevated troponin.  Thinks hypotension is from hypovolemia secondary to her dysphagia as below.  -Cardiology consulted, recommendation appreciated.  -TTE 6/19 EF of 50-55 percent with grade 1 diastolic dysfunction   -[facility] 6/15/2025 without any significant CAD  -Serial troponin T level completed  -Cardiac monitoring  -Cardiac diet  -Continue PTA 81 mg aspirin (received 325 mg aspirin)  -Lipid panel completed  -No beta-blocker or ACE inhibitor due to hypotension.   - continue midodrine, dose increased to 10 mg TID      Tobacco dependence  Suspected COPD: Scheduled for COPD testing next week, likely PFTs.  She smoked for 50 years and quit several weeks ago.  No wheezing on exam now.  Has had shortness of breath for the past 3 weeks as above.     Dysphagia  Unintentional weight loss  History gastric bypass  History gastric ulcer: Months of dysphagia more with solids than liquids.  Food feels like he gets stuck in her esophagus and she has a lot of dry heaving regurgitation after this.  Denies any significant heartburn symptoms.  Has had previous gastric ulcer and  takes daily 40 mg Protonix.  Has also had previous gastric bypass surgery.  Has lost 50 pounds over the last 3 months due to decreased appetite and swallowing issues.  Esophageal cancer ruled out by EGD.  Denies any melena or hematochezia.  -Video swallow 6/19 with flash penetration of thin liquids no jerry aspiration, SLP recs soft bite-size diet with thin liquids appreciated  -Consult dietitian. Done  -Continue 40 mg daily Protonix     Tobacco dependence  Suspected COPD: She reports she is supposed to have COPD testing next week, suspect is PFTs.  She smoked for 50 years and quit several weeks ago.  No wheezing on exam now.  Has had shortness of breath for the past 3 weeks as above.  Normal O2 sat RA.      MDD  Anxiety: Resume PTA duloxetine/trazodone.     Chronic pain syndrome:   - on Norco 5 mg tablets 3 times a day as needed for chronic pain.  She may also be on gabapentin, awaiting formal med rec.  -Resume Norco 5 mg tablets at every 6 hours as needed     Hypothyroidism: Resume PTA levothyroxine 75 mcg daily.     Malnutrition Diagnosis: Severe malnutrition  In Context of:  Acute illness or injury or chronic illness or disease (etiology unknown at this time)      Consultations This Hospital Stay   PHARMACY IP CONSULT  CARDIAC REHAB IP CONSULT  CARDIOLOGY IP CONSULT  GASTROENTEROLOGY IP CONSULT  PHYSICAL THERAPY ADULT IP CONSULT  CARE MANAGEMENT / SOCIAL WORK IP CONSULT  NUTRITION SERVICES ADULT IP CONSULT  SPEECH LANGUAGE PATH ADULT IP CONSULT  PHARMACY IP CONSULT  PHARMACY IP CONSULT  SPEECH LANGUAGE PATH ADULT IP CONSULT  ADVANCE DIRECTIVE IP CONSULT  SMOKING CESSATION PROGRAM IP CONSULT  SMOKING CESSATION PROGRAM IP CONSULT    Code Status   No CPR- Do NOT Intubate    Time Spent on this Encounter   I, Fernando Diaz MD, personally saw the patient today and spent greater than 30 minutes discharging this patient.       Fernando Diaz MD  [place] [address] [facility]  [address] [place]  [address]  Phone: [phone]  Fax: [phone]  ______________________________________________________________________    Physical Exam   Vital Signs: Temp: 97.9  F (36.6  C) Temp src: Oral BP: (!) 89/60 Pulse: 73   Resp: 18 SpO2: 96 % O2 Device: None (Room air)    Weight: 121 lbs 14.4 oz  GEN:  Alert, oriented x 3, appears comfortable, NAD.  HEENT:  Normocephalic/atraumatic, no scleral icterus, no nasal discharge, mouth moist.  CV:  Regular rate and rhythm, ENDY ii/vi LSB. No  JVD.  S1 + S2 noted, no S3 or S4.  LUNGS:  Clear to auscultation bilaterally without rales/rhonchi/wheezing/retractions.  Symmetric chest rise on inhalation noted.  ABD:  Active bowel sounds, soft, non-tender/non-distended.  No rebound/guarding/rigidity.  EXT:  No edema or cyanosis.  No joint synovitis noted.  SKIN:  Dry to touch, no exanthems noted in the visualized areas.            Primary Care Physician   LUZ ELENA LION    Discharge Orders      Follow-Up with Cardiology      Follow-Up with Cardiology JUAN JOSÉ      General info for SNF    Length of Stay Estimate: Short Term Care: Estimated # of Days <30  Condition at Discharge: Improving  Level of care:skilled   Rehabilitation Potential: Excellent  Admission H&P remains valid and up-to-date: Yes  Recent Chemotherapy: N/A  Use Nursing Home Standing Orders: Yes     Follow Up and recommended labs and tests    Follow up with Nursing home physician.  No follow up labs or test are needed.     Reason for your hospital stay    Stress induced cardiomyopathy, Dynamic LVOT obstruction     Activity - Up with nursing assistance     Fall precautions     Diet    Follow this diet upon discharge: Orders Placed This Encounter      Snacks/Supplements Adult: Other; Ok for prn Ensure or [place] per pt request; Between Meals      Snacks/Supplements Adult: Other; [place] breakfast essentials; With Meals      Combination Diet Easy to Chew (level 7); Thin Liquids (level 0)       Significant Results and Procedures    Results for orders placed or performed during the hospital encounter of 23   XR Chest 2 Views    Narrative    EXAM: XR CHEST 2 VIEWS  LOCATION: [facility]  DATE: 6/15/2023    INDICATION: 3 weeks shortness of breath without cough.  possible CHF.  COMPARISON: 2023      Impression    IMPRESSION:        Heart size within normal limits. Mild pulmonary vascular congestion. Hyperinflation versus deep inspiration changes. Clinical correlation for air trapping. No focal lung infiltrates. Osseous structures grossly intact. Visualized upper abdomen   unremarkable.    XR Video Swallow with SLP or OT    Narrative    VIDEO SWALLOW WITH SLP OR OT   2023 10:03 AM     HISTORY: Dysphagia with solids.    COMPARISON: None.    FINDINGS:  A swallow study was performed in coordination with a member  from the speech pathology service. Total fluoroscopy time was 1.8  minutes. 5 spot fluoroscopic images, and/or cine clips were obtained.  2 views in frontal and lateral projection. Various consistencies of  barium were given to the patient to swallow, and the swallowing  mechanism was observed using fluoroscopy.    Penetration with thin liquids. No aspiration identified.      Impression    IMPRESSION:  1. Penetration with thin liquids. No aspiration.  2. Please refer to the speech pathology report for further details.    This study includes only the cervical esophagus.     NIURKA ESTEVEZ MD         SYSTEM ID:  [id number]   Echocardiogram Complete     Value    LVEF  30-35%    Narrative    [id number]  [facility]  [id number]  [id number]^MARCELO^EVY^SHAYNA     [facility]  Echocardiography Laboratory  [address] [place] [address]     Name: GREGORY PERDOMO  MRN: [id number]  : 1945  Study Date: 06/15/2023 08:33 AM  Age: 78 yrs  Gender: Female  Patient Location: [facility]  Reason For Study: Chest Pain, Chest Tightness, Chest Pressure  Ordering Physician: EVY ROBERTSON  Performed  "By: Meron Garcia     BSA: 1.5 m2  Height: 60 in  Weight: 115 lb  HR: 81  BP: 76/50 mmHg  ______________________________________________________________________________  Procedure  Complete Portable Echo Adult. Optison (NDC #[id number]) given intravenously.  ______________________________________________________________________________  Interpretation Summary     The visual ejection fraction is 30-35%.  Left ventricular systolic function is moderately reduced.  Only the basal portions of the left ventricle contract normally with the  remaining segments being severely hypokinetic to akinetic. Takotsubo stress  cardiomyopathy would have an appearance similar to this and dynamic outflow  tract obstruction can occur with this condition due to the hyperdynamic base.  There is mild to moderate (1-2+) mitral regurgitation.  There is systolic anterior motion of the mitral valve.  There is mild to moderate (1-2+) tricuspid regurgitation.  There is trace aortic regurgitation.  Trivial pericardial effusion  Mild aortic root dilatation.  There has been a significant drop in LV systolic function since 2013. The  study was technically difficult.  ______________________________________________________________________________  Left Ventricle  The left ventricle is normal in size. There is normal left ventricular wall  thickness. A sigmoid septum is present. Diastolic Doppler findings (E/E' ratio  and/or other parameters) suggest left ventricular filling pressures are  increased. The visual ejection fraction is 30-35%. Left ventricular systolic  function is moderately reduced. A severe left ventricular outflow tract (LVOT)  obstruction is present. The resting dynamic LVOT gradient is 105 mmHg. The  recorded Valsalva gradient is erroneous and does not have the \"dagger\" shape  of an dynamic outflow signal and most likely represents mitral regurgitation  signal. Only the basal portions of the left ventricle contract normally " with  the remaiong segments being severely hypokinetic to akinetic. Takotsubo stress  cardiomyopathy would have an appearance similar to this and dynamic outflow  tract obstruction can occur with this condition due to the hyperdynamic base.     Right Ventricle  The right ventricle is normal in size and function.     Atria  The left atrium is moderately dilated. Right atrial size is normal. Lipomatous  hypertrophy of the interatrial septum is noted. There is no color Doppler  evidence of an atrial shunt.     Mitral Valve  There is mild mitral annular calcification. The mitral valve leaflets are  mildly thickened. There is systolic anterior motion of the mitral valve. The  mitral valve chordae are thickened and/or calcified. There is mild to moderate  (1-2+) mitral regurgitation. The mitral regurgitant jet is posteriorly  directed, which is consistent with anterior leaflet pathology.     Tricuspid Valve  There is mild to moderate (1-2+) tricuspid regurgitation. The right  ventricular systolic pressure is approximated at 24.7 mmHg plus the right  atrial pressure. IVC diameter >2.1 cm collapsing <50% with sniff suggests a  high RA pressure estimated at 15 mmHg or greater. Right ventricular systolic  pressure is elevated, consistent with mild to moderate pulmonary hypertension.     Aortic Valve  The aortic valve is not well visualized. Thickened aortic valve leaflets.  There is trace aortic regurgitation. The peak AoV pressure gradient is 12.0  mmHg. The mean AoV pressure gradient is 7.0 mmHg. No hemodynamically  significant valvular aortic stenosis.     Pulmonic Valve  The pulmonic valve is not well visualized. Normal pulmonic valve velocity.     Vessels  Mild aortic root dilatation. IVC diameter >2.1 cm collapsing <50% with sniff  suggests a high RA pressure estimated at 15 mmHg or greater.     Pericardium  Trivial pericardial effusion.     Rhythm  Sinus rhythm was  noted.  ______________________________________________________________________________  MMode/2D Measurements & Calculations     IVSd: 0.95 cm  LVIDd: 4.0 cm  LVIDs: 3.2 cm  LVPWd: 0.89 cm  IVC diam: 2.3 cm  FS: 20.8 %  LV mass(C)d: 114.9 grams  LV mass(C)dI: 77.8 grams/m2  Ao root diam: 3.8 cm  LVOT diam: 2.4 cm  LVOT area: 4.5 cm2  LA Volume (BP): 64.5 ml  LA Volume Index (BP): 43.6 ml/m2  RV Base: 2.9 cm  RWT: 0.44     TAPSE: 1.5 cm     Doppler Measurements & Calculations  MV E max sharath: 85.2 cm/sec  MV A max sharath: 116.0 cm/sec  MV E/A: 0.73  MV max P.0 mmHg  MV mean P.0 mmHg  MV V2 VTI: 23.5 cm  MV P1/2t max sharath: 87.3 cm/sec  MV P1/2t: 61.3 msec  MVA(P1/2t): 3.6 cm2  MV dec slope: 417.0 cm/sec2  MV dec time: 0.21 sec  Ao V2 max: 175.0 cm/sec  Ao max P.0 mmHg  Ao V2 mean: 120.0 cm/sec  Ao mean P.0 mmHg  Ao V2 VTI: 31.2 cm  AI P1/2t: 225.1 msec  MR PISA: 4.0 cm2  MR ERO: 0.28 cm2  MR volume: 46.2 ml  PA V2 max: 109.0 cm/sec  PA max P.8 mmHg  PA mean P.0 mmHg  PA V2 VTI: 19.8 cm  PA acc time: 0.16 sec  TR max sharath: 248.5 cm/sec  TR max P.7 mmHg  E/E' av.0  Lateral E/e': 14.8  Medial E/e': 21.1  RV S Sharath: 12.8 cm/sec     ______________________________________________________________________________  Report approved by: Shelbi Barry 06/15/2023 11:29 AM         Echocardiogram Limited     Value    LVEF  50-55%    Narrative    [id number]  [id number]  [id number]  [id number]^ZAHIDA^SONNY^SHAYNA     [facility]  Echocardiography Laboratory  [address] [place] [address]     Name: GREGORY PERDOMO  MRN: [id number]  : 1945  Study Date: 2023 01:30 PM  Age: 78 yrs  Gender: Female  Patient [facility]: [facility]  Reason For Study: Stress Cardiomyopathy  Ordering Physician: SONNY TEAGUE  Performed By: Malu Galarza     BSA: 1.5 m2  Height: 60 in  Weight: 120 lb  HR: 73  BP: 102/63  mmHg  ______________________________________________________________________________  Procedure  Limited Portable Echo Adult. Optison (NDC #[id number]) given intravenously.  ______________________________________________________________________________  Interpretation Summary     The visual ejection fraction is 50-55%. Less extensive wall motion  abnoramlities as compared with study 4 days ago. Now severe hypokinesis appear  confined to mid and distal septum.     There is mild (1+) aortic regurgitation.  Small pericardial effusion  There are no echocardiographic indications of cardiac tamponade.  There is mild (1+) mitral regurgitation.  LVOT gradient present, likely due to presence of sigmoid septum and  hyperdynamic gradient. Peak gradients 50 - 140 mmHg with and without Valsalva.     ______________________________________________________________________________  Left Ventricle  The left ventricle is normal in size. A sigmoid septum is present. The visual  ejection fraction is 50-55%. Grade I or early diastolic dysfunction.     Right Ventricle  The right ventricle is normal in structure, function and size.     Mitral Valve  The mitral valve leaflets appear thickened, but open well. There is mild  mitral annular calcification. There is mild (1+) mitral regurgitation.     Tricuspid Valve  Normal tricuspid valve. There is mild (1+) tricuspid regurgitation.     Aortic Valve  There is moderate trileaflet aortic sclerosis. There is mild (1+) aortic  regurgitation.     Pulmonic Valve  Normal pulmonic valve.     Pericardium  Small pericardial effusion. There are no echocardiographic indications of  cardiac tamponade.  ______________________________________________________________________________  Doppler Measurements & Calculations  TR max pranav: 230.5 cm/sec  TR max P.3 mmHg     ______________________________________________________________________________  Report approved by: Shelbi Ragland 2023 02:20 PM          Cardiac Catheterization     Value    Cath EF Estimated 35    Narrative    Proximal-mid LAD stent is patent with 40-50% ISR.  Compared to the report   of the 2013 angiogram, this is probably not substantially changed.  Mild non-obstructive CAD elsewhere  Severely elevated left-sided filling pressures (LVEDP 29 mmHg)  LV gram shows basal hyperkinesis, with mid-apical hypokinesis/akinesis,   suggestive of Takotsubo (stress-induced) cardiomyopathy.           Discharge Medications   Current Discharge Medication List      START taking these medications    Details   midodrine (PROAMATINE) 10 MG tablet Take 1 tablet (10 mg) by mouth 3 times daily (with meals)    Associated Diagnoses: Hypotension due to hypovolemia         CONTINUE these medications which have CHANGED    Details   atorvastatin (LIPITOR) 40 MG tablet Take 1 tablet (40 mg) by mouth every evening    Associated Diagnoses: Hyperlipidemia LDL goal <70      HYDROcodone-acetaminophen (NORCO) 5-325 MG tablet Take 1 tablet by mouth every 6 hours as needed for moderate pain  Qty: 12 tablet, Refills: 0    Associated Diagnoses: Chronic pain syndrome         CONTINUE these medications which have NOT CHANGED    Details   aspirin EC 81 MG EC tablet Take 1 tablet (81 mg) by mouth daily  Qty: 90 tablet, Refills: 0    Associated Diagnoses: Chest pain      diphenhydrAMINE (BENADRYL) 25 MG tablet Take 25 mg by mouth At Bedtime      docusate sodium (COLACE) 100 MG capsule Take 100 mg by mouth as needed for constipation      DULoxetine (CYMBALTA) 60 MG capsule Take 1 capsule (60 mg) by mouth 2 times daily  Qty: 60 capsule, Refills: 1    Associated Diagnoses: Major depressive disorder, recurrent episode, mild (H); WILLIAM (generalized anxiety disorder)      hydrOXYzine (VISTARIL) 50 MG capsule Take 1 capsule (50 mg) by mouth every 6 hours as needed for anxiety  Qty: 60 capsule, Refills: 1    Associated Diagnoses: WILLIAM (generalized anxiety disorder)      levothyroxine  (SYNTHROID/LEVOTHROID) 75 MCG tablet Take 1 tablet (75 mcg) by mouth daily  Qty: 30 tablet, Refills: 1    Associated Diagnoses: Acquired hypothyroidism      pantoprazole (PROTONIX) 40 MG EC tablet Take 1 tablet (40 mg) by mouth daily  Qty: 30 tablet, Refills: 1    Associated Diagnoses: Gastroesophageal reflux disease without esophagitis      traZODone (DESYREL) 100 MG tablet Take 1 tablet (100 mg) by mouth nightly as needed for sleep  Qty: 30 tablet, Refills: 1    Associated Diagnoses: Severe episode of recurrent major depressive disorder, without psychotic features (H)      nitroGLYcerin (NITROSTAT) 0.4 MG sublingual tablet Place 0.4 mg under the tongue every 5 minutes as needed for chest pain For chest pain place 1 tablet under the tongue every 5 minutes for 3 doses. If symptoms persist 5 minutes after 1st dose call 911.           Allergies   Allergies   Allergen Reactions     Monosodium Glutamate      Morphine Hcl Rash     Nicotine Polacrilex [Nicotine] Rash     Gets a rash from nicotine patch. Pt is a smoker.

## 2023-06-21 NOTE — PLAN OF CARE
A&O x 4. Up with SBA. Tolerating diet- easy to chew. NSR on tele. Abdominal pain and pain after dinner. Oxycodone for abdominal pain. Zofran x 1. IV SL  Plan to discharge to [facility] tomorrow- bed will not be available till after one. Friend to provide transportation.

## 2023-06-21 NOTE — PLAN OF CARE
Occupational Therapy Discharge Summary    Reason for therapy discharge:    Discharged to transitional care facility.    Progress towards therapy goal(s). See goals on Care Plan in [facility] electronic health record for goal details.  Goals not met.  Barriers to achieving goals:   discharge from facility.    Therapy recommendation(s):    Continued therapy is recommended.  Rationale/Recommendations:  OT eval and treat at TCU.        **Pt not seen by discharging therapist on this date, note written based on previous treating therapist's notes and recommendations

## 2023-06-22 NOTE — PLAN OF CARE
Physical Therapy Discharge Summary     Reason for therapy discharge:    Discharged to transitional care facility.     Progress towards therapy goal(s). See goals on Care Plan in [facility] electronic health record for goal details.  Goals not met.  Barriers to achieving goals:   discharge from facility.     Therapy recommendation(s):    Continued therapy is recommended.  Rationale/Recommendations:  Patient would benefit from PT eval at TCU in order to increase strength, activity tolerance, balance and independence with mobility.    **Pt not seen by discharging therapist on this date, note written based on previous treating therapist's notes and recommendations

## 2023-07-09 NOTE — DISCHARGE INSTRUCTIONS
1. -Take acetaminophen 500 to 1000 mg by mouth every 4 to 6 hours as needed for pain or fever.  Do not take more than 4000 mg in 24 hours.  Do not take within 6 hours of another acetaminophen containing medication such as norco (vicodin) or percocet.  - Take ibuprofen 600 to 800 mg by mouth every 6 to 8 hours as needed for pain or fever  2. Use ice as needed for pain and swelling.  3. Elevate extremity to help with swelling.  4. Follow-up with your primary doctor as needed.  5. You may return to the ED as needed for new or worsening symptoms such as reinjury, severe and uncontrollable pain, focal weakness, severe numbness and tingling, any other concerning symptoms.

## 2023-07-09 NOTE — ED TRIAGE NOTES
Fell yesterday 1630 from a hollie top chair. NO LOC, states drank a lot of alcohol involved yesterday, no drink today. Dizzy /light headed, pain to head, pain I left wrist, left hip.       Triage Assessment     Row Name 07/09/23 1010       Triage Assessment (Adult)    Airway WDL WDL       Respiratory WDL    Respiratory WDL WDL       Cardiac WDL    Cardiac WDL X  low BP       Peripheral/Neurovascular WDL    Peripheral Neurovascular WDL WDL

## 2023-07-09 NOTE — ED PROVIDER NOTES
History     Chief Complaint:  Fall (Fell yesterday 1630 from a hollie top chair. NO LOC, states drank a lot of alcohol involved yesterday, no drink today. Dizzy /light headed, pain to head, pain I left wrist, left hip.  )       CEDRIC Figueroa is a 78-year-old female with past medical history significant for coronary artery disease, WILLIAM, MDD who presents emergency department via EMS with a chief complaint of mechanical fall from a barstool height while drinking alcohol yesterday and associated left upper extremity pain, left hip pain, moderate to severe, worse with palpation and movement.  Patient denies loss of consciousness when she fell, chest pain, abdominal pain, changes in urination, changes in bowel movements.  She endorses feeling lightheaded.  EMS reports that the patient was hypotensive in route to the hospital. Patient reports that she smokes cigarettes and occasionally drinks alcohol      Independent Historian:   EMS - They report as above    Review of External Notes:   See MDM       Medications:    Aspirin 81 mg   Atorvastatin   Docusate sodium   Duloxetine   Hydroxyzine  Levothyroxine   Midodrine   Nitroglycerin   Pantoprazole  Trazodone       Past Medical History:    Abdominal wall hernia   Anemia  Adenomatous polyp of colon   CAD    Chronic kidney disease   Degenerative disk disease   WILLIAM   GERD   Chronic pain syndrome  Hyperlipidemia   Major depression   Nephrolithiasis  Insomnia  Hypertension   Osteoporosis     Past Surgical History:    Appendectomy   Cholecystectomy  Hernia repair     Physical Exam     Patient Vitals for the past 24 hrs:   BP Temp Temp src Pulse Resp SpO2 Height Weight   07/09/23 1319 100/61 -- -- 79 -- -- -- --   07/09/23 1318 117/69 -- -- 80 -- 92 % -- --   07/09/23 1315 120/69 -- -- 80 -- 99 % -- --   07/09/23 1241 -- -- -- -- -- 100 % -- --   07/09/23 1217 -- -- -- -- -- 100 % -- --   07/09/23 1151 -- -- -- -- -- 98 % -- --   07/09/23 1139 -- -- -- -- -- 100 % -- --    07/09/23 1136 -- -- -- -- -- 97 % -- --   07/09/23 1131 96/62 -- -- 72 16 98 % -- --   07/09/23 1030 94/54 -- -- 70 -- -- -- --   07/09/23 1022 97/50 -- -- 67 -- -- -- --   07/09/23 1015 93/57 -- -- 71 -- -- -- --   07/09/23 1013 94/69 -- -- 97 -- 100 % -- --   07/09/23 1004 90/57 -- -- 76 -- -- -- --   07/09/23 1000 (!) 86/54 97.4  F (36.3  C) Oral 79 -- 100 % 1.524 m (5') 53.5 kg (118 lb)        Physical Exam  Vital signs reviewed.  Nursing note reviewed.  Constitutional: Well-developed, Well-nourished.  Non-diaphoretic.  Mild distress    HENT: No evidence of head trauma.  No pain or instability to palpation of bony orbits, mandible, maxilla.  Good jaw opening.  No malocclusion.  No nasal septal hematoma.  OP clear and moist.    EYES:  PERRL.  EOMI.  NECK:  No Cspine tenderness.  Trachea midline.  Full ROM.  Supple. No stridor.  CARDIAC:  RRR. 2+ bilat radial pulses   CHEST:  chest NT  PULM: Effort  Normal.  Breath sounds clear and equal bilat  ABD:  Soft, NT/ND  No guarding, no rebound.    : No CVA T.    BACK: Positive mid T-spine tenderness: No L spinous process tenderness.  Pelvis stable.  EXT:  Full ROM X4.  No tenderness, edema, crepitus or obvious deformity other than that noted below              LUE: limited ROM due to pain, tenderness and elbow and wrist with erythema and minor swelling, no obvious crepitance               LLE: Lateral thigh and fibular head tenderness otherwise full range of motion  NEURO:  Alert, Oriented X3.  5/5 UE and LE, proximal and distal.  Sensation intact to LT.   SKIN :  Warm.  Dry.   No erythema.  No rash  PSYCH.:  Normal judgment.  Normal affect.      Emergency Department Course   ECG  ECG taken at 1133, ECG read at 1138  Normal sinus rhythm   T wave abnormality, consider anterior ischemia   No significant change as compared to prior, dated 06/14/2023.  Rate 72 bpm. MN interval 182 ms. QRS duration 90 ms. QT/QTc 408/446 ms. P-R-T axes 60 53 233.     Imaging:  XR Tibia and  Fibula Left 2 Views   Final Result   IMPRESSION: Normal tibia and fibula. No fracture. Osteopenia.      XR Pelvis and Hip Left 1 View   Final Result   IMPRESSION: No fracture. Osteopenia. Status post bilateral sacroiliac joint fusion. Status post lumbosacral spinal fusion. Benign island of sclerosis in the supra-acetabular region on the left.      XR Wrist Left G/E 3 Views   Final Result   IMPRESSION: No fracture or dislocation. Osteopenia. Moderate degenerative changes at the first CMC joint. Slight widening of the scapholunate interval.      Elbow  XR, G/E 3 views, left   Final Result   IMPRESSION: Normal joint spaces and alignment. No fracture or joint effusion.      CT Thoracic Spine w/o Contrast   Final Result   IMPRESSION:   1.  No acute fracture.            CT Chest/Abdomen/Pelvis w Contrast   Final Result   IMPRESSION:   1.  No acute traumatic abnormality identified.   2.  New nodularity at the superior aspect of the left upper lobe. Recommend short interval follow-up CT chest in 3 months for surveillance.   3.  Stable prominent ventral abdominal hernia containing nonobstructed bowel loops. Prominent stool throughout the colon.      CT Head w/o Contrast   Final Result   IMPRESSION:   1.  No CT evidence for acute intracranial process.   2.  Brain atrophy and presumed chronic microvascular ischemic changes as above.               Report per radiology    Laboratory:  Labs Ordered and Resulted from Time of ED Arrival to Time of ED Departure   COMPREHENSIVE METABOLIC PANEL - Abnormal       Result Value    Sodium 139      Potassium 4.3      Chloride 107      Carbon Dioxide (CO2) 25      Anion Gap 7      Urea Nitrogen 14.2      Creatinine 0.44 (*)     Calcium 8.4 (*)     Glucose 97      Alkaline Phosphatase 76      AST 24      ALT 15      Protein Total 5.2 (*)     Albumin 3.2 (*)     Bilirubin Total 0.3      GFR Estimate >90     CBC WITH PLATELETS AND DIFFERENTIAL - Abnormal    WBC Count 5.8      RBC Count 3.61 (*)      Hemoglobin 10.8 (*)     Hematocrit 33.6 (*)     MCV 93      MCH 29.9      MCHC 32.1      RDW 18.4 (*)     Platelet Count 265      % Neutrophils 61      % Lymphocytes 23      % Monocytes 12      % Eosinophils 2      % Basophils 1      % Immature Granulocytes 1      NRBCs per 100 WBC 0      Absolute Neutrophils 3.6      Absolute Lymphocytes 1.4      Absolute Monocytes 0.7      Absolute Eosinophils 0.1      Absolute Basophils 0.1      Absolute Immature Granulocytes 0.0      Absolute NRBCs 0.0     INR - Normal    INR 0.95     PARTIAL THROMBOPLASTIN TIME - Normal    aPTT 27     ETHYL ALCOHOL LEVEL - Normal    Alcohol ethyl <0.01          Emergency Department Course & Assessments:       Interventions:  Medications   lidocaine 1 % 0.1-1 mL (has no administration in time range)   lidocaine (LMX4) cream (has no administration in time range)   sodium chloride (PF) 0.9% PF flush 3 mL (has no administration in time range)   sodium chloride (PF) 0.9% PF flush 3 mL (has no administration in time range)   lactated ringers BOLUS 1,000 mL (0 mLs Intravenous Stopped 7/9/23 1120)     Followed by   lactated ringers infusion (0 mLs Intravenous Hold 7/9/23 1305)   fentaNYL (PF) (SUBLIMAZE) injection 100 mcg (has no administration in time range)   sodium chloride for CT scan flush use (55 mLs Intravenous $Given 7/9/23 1046)   iopamidol (ISOVUE-370) solution 500 mL (60 mLs Intravenous $Given 7/9/23 1046)   0.9% sodium chloride BOLUS (0 mLs Intravenous Stopped 7/9/23 1359)        Assessments:  1004 I obtained history and examined the patient.   1255 I rechecked the patient and explained findings.   1420 I rechecked the patient and explained findings.     Independent Interpretation (X-rays, CTs, rhythm strip):  See MDM    Consultations/Discussion of Management or Tests:  None        Social Determinants of Health affecting care:   See MDM    Disposition:  The patient was discharged to home.     Impression & Plan      Medical Decision  Makin year old female presenting w/ left upper and lower extremity pain s/p mechanical fall while ingesting alcohol     Social determinants affecting patient's health include:  Tobacco use increasing risk of complications related to tobacco use, alcohol use which likely plays a direct role patient presentation to the emergency department, increases risk for recurrent ED visits     I reviewed medical records from  for medicine office visit on 7/3/2023     DDx includes mechanical fall, fracture, contusion, intracranial hemorrhage, skull fracture.  Doubt seizure, syncope, CVA given history and physical exam.  No other evidence of trauma on full primary and secondary trauma surveys other than areas imaged above or documented in physical exam.  Given hypotension, labs and EKG ordered. EKG sig for normal sinus rhythm without acute ischemic findings on my independent interpretation.  Labs sig for minimal hypocalcemia, minimal anemia otherwise unremarkable.   Imaging sig for no acute traumatic abnormality or fracture.  Interventions as noted above.  Patient's blood pressure improved with IV fluids.  She is likely dehydrated from drinking a significant amount of alcohol yesterday and poor p.o. intake.  Given reassuring work-up and no evidence of acute traumatic abnormality, at this time I feel the pt is safe for discharge.  Recommendations given regarding follow up with PCP and return to the emergency department as needed for new or worsening symptoms.  Pt counseled on all results, disposition and diagnosis.  They are understanding and agreeable to plan. Patient discharged in stable condition.          Diagnosis:    ICD-10-CM    1. Pulmonary nodule  R91.1       2. Fall, initial encounter  W19.XXXA       3. Contusion of left elbow, initial encounter  S50.02XA       4. Contusion of left wrist, initial encounter  S60.212A       5. Pain of left lower leg  M79.662       6. Multiple abrasions  T07.XXXA       7. Mild anemia   D64.9            Discharge Medications:  Discharge Medication List as of 7/9/2023  2:18 PM             Scribe Disclosure:  I, Celina Davila, am serving as a scribe at 10:11 AM on 7/9/2023 to document services personally performed by Eric Lopez MD based on my observations and the provider's statements to me.   7/9/2023   Eric Lopez MD Vaughn, Christopher E, MD  07/09/23 1614

## 2023-07-09 NOTE — ED NOTES
Bed: ED38  Expected date: 7/9/23  Expected time: 9:52 AM  Means of arrival: Ambulance  Comments:  A [phone]

## 2023-11-19 NOTE — Clinical Note
left ventricle Cine(s)  injected and visualized utilizing power injector system. No heavy lifting/straining

## 2024-05-30 NOTE — PROGRESS NOTES
Emergency Department Provider Note       PCP: Keith Santoyo MD   Age: 59 y.o.   Sex: female     DISPOSITION Decision To Discharge 05/30/2024 06:13:26 AM       ICD-10-CM    1. Acute abdominal pain  R10.9 HYDROcodone-acetaminophen (NORCO) 5-325 MG per tablet      2. Acute gastritis, presence of bleeding unspecified, unspecified gastritis type  K29.00       3. Chronic pancreatitis, unspecified pancreatitis type (HCC)  K86.1 HYDROcodone-acetaminophen (NORCO) 5-325 MG per tablet          Medical Decision Making     59-year-old female with history of hiatal hernia as well as recurrent pancreatitis presents with severe abdominal pain with associated nausea and decreased p.o. intake secondary to pain.  On exam she has moderate diffuse abdominal tenderness more so in the epigastric region.  No evidence of rebound or mass.  Lab work reveals a white count of 5.1 with a normal differential, unremarkable CMP, lipase mildly elevated at 115, and a UA which again appears to be contaminated with more epithelial cells than white cells.  Considering the patient has no symptoms of UTI, do not feel necessary to treat at this time.  Patient was given IV fluids, Zofran, Dilaudid, and Pepcid with moderate improvement.  Attempts to drink were unsuccessful due to pain, so the patient was given another dose of Dilaudid and screened with a CT abdomen pelvis which revealed no acute intra-abdominal pathology.  She was also given Maalox and lidocaine as well as 2 mg of Haldol IV with significant improvement in her abdominal discomfort.  An extensive discussion over with the patient should and should not be eating based on her history of both hiatal hernia as well as pancreatitis was reviewed.  Patient be instructed to follow-up with her family doctor next week.  Return if symptoms worsen.  ED Course as of 05/31/24 0056   Thu May 30, 2024   0244 Patient describes mild improvement with Dilaudid, still complains of epigastric pain.   Pt participated in dance/movement therapy (D/MT) using symbolic nonverbal expression to explore therapeutic processes.  Pt was socially-engaged, demonstrating good eye contact and body organization.  Her affect matched her verbal and nonverbal expression.  She stated she really wanted to dance and stood to move with rhythmic engagement.       09/28/21 1115   Dance Movement Therapy   Type of Intervention structured groups   Response participates, initiates socially appropriate   Hours 1

## (undated) DEVICE — KIT ENDO TURNOVER/PROCEDURE W/CLEAN A SCOPE LINERS 103888

## (undated) DEVICE — ENDO BITE BLOCK ADULT OLYMPUS LATEX FREE MAJ-1632

## (undated) DEVICE — SLEEVE TR BAND RADIAL COMPRESSION DEVICE 24CM TRB24-REG

## (undated) DEVICE — CATH ANGIO INFINITI PIGTAIL 145 6 SH 6FRX110CM  534-652S

## (undated) DEVICE — GLIDEWIRE TERUMO .035X180CM 1.5,, J-TIP GR3525

## (undated) DEVICE — GUIDEWIRE VASC 0.035INX150CM INQWIRE J TIP IQ35F150J3F/A

## (undated) DEVICE — KIT HAND CONTROL ANGIOTOUCH ACIST 65CM AT-P65

## (undated) DEVICE — INTRO GLIDESHEATH SLENDER 6FR 10X45CM 60-1060

## (undated) DEVICE — WIRE GUIDE 0.035"X260CM SAFE-T-J EXCHANGE G00517

## (undated) DEVICE — MANIFOLD KIT ANGIO AUTOMATED 014613

## (undated) DEVICE — 4IN X 6IN PADPRO RADIOTRANSPARENT ELECTRODE DEFRIBILLATOR ADHESIVE PAD, ADULT

## (undated) DEVICE — CATH DIAGNOSTIC RADIAL 5FR TIG 4.0

## (undated) RX ORDER — SIMETHICONE 40MG/0.6ML
SUSPENSION, DROPS(FINAL DOSAGE FORM)(ML) ORAL
Status: DISPENSED
Start: 2023-01-01

## (undated) RX ORDER — FENTANYL CITRATE 50 UG/ML
INJECTION, SOLUTION INTRAMUSCULAR; INTRAVENOUS
Status: DISPENSED
Start: 2023-01-01